# Patient Record
Sex: MALE | Race: WHITE | HISPANIC OR LATINO | Employment: FULL TIME | URBAN - METROPOLITAN AREA
[De-identification: names, ages, dates, MRNs, and addresses within clinical notes are randomized per-mention and may not be internally consistent; named-entity substitution may affect disease eponyms.]

---

## 2018-08-22 ENCOUNTER — HOSPITAL ENCOUNTER (EMERGENCY)
Facility: HOSPITAL | Age: 28
Discharge: HOME | End: 2018-08-22
Attending: EMERGENCY MEDICINE | Admitting: EMERGENCY MEDICINE
Payer: COMMERCIAL

## 2018-08-22 ENCOUNTER — APPOINTMENT (EMERGENCY)
Dept: RADIOLOGY | Facility: HOSPITAL | Age: 28
End: 2018-08-22
Payer: COMMERCIAL

## 2018-08-22 VITALS
HEART RATE: 106 BPM | OXYGEN SATURATION: 93 % | DIASTOLIC BLOOD PRESSURE: 87 MMHG | WEIGHT: 308.86 LBS | RESPIRATION RATE: 18 BRPM | HEIGHT: 72 IN | SYSTOLIC BLOOD PRESSURE: 149 MMHG | BODY MASS INDEX: 41.83 KG/M2 | TEMPERATURE: 98 F

## 2018-08-22 DIAGNOSIS — G89.29 CHRONIC LOW BACK PAIN: ICD-10-CM

## 2018-08-22 DIAGNOSIS — M54.50 CHRONIC LOW BACK PAIN: ICD-10-CM

## 2018-08-22 DIAGNOSIS — G89.29 CHRONIC KNEE PAIN: ICD-10-CM

## 2018-08-22 DIAGNOSIS — M25.569 CHRONIC KNEE PAIN: ICD-10-CM

## 2018-08-22 DIAGNOSIS — S80.00XA KNEE CONTUSION: Primary | ICD-10-CM

## 2018-08-22 PROCEDURE — 73564 X-RAY EXAM KNEE 4 OR MORE: CPT

## 2018-08-22 PROCEDURE — 99283 EMERGENCY DEPT VISIT LOW MDM: CPT

## 2018-08-22 PROCEDURE — 72100 X-RAY EXAM L-S SPINE 2/3 VWS: CPT

## 2018-08-22 RX ORDER — ACETAMINOPHEN 325 MG/1
975 TABLET ORAL ONCE
Status: COMPLETED | OUTPATIENT
Start: 2018-08-22 | End: 2018-08-22

## 2018-08-22 RX ORDER — NAPROXEN 250 MG/1
500 TABLET ORAL ONCE
Status: COMPLETED | OUTPATIENT
Start: 2018-08-22 | End: 2018-08-22

## 2018-08-22 RX ADMIN — ACETAMINOPHEN 975 MG: 325 TABLET, FILM COATED ORAL at 03:50

## 2018-08-22 RX ADMIN — NAPROXEN 500 MG: 250 TABLET ORAL at 03:50

## 2018-08-22 NOTE — DISCHARGE INSTRUCTIONS
You may continue taking anti-inflammatories and acetaminophen as previously directed  Keep your appointment with Dr Raghav Lind and check with the office to see if an earlier appointment is available  Chronic Back Pain   WHAT YOU NEED TO KNOW:   Chronic back pain is back pain that lasts 3 months or longer  This may include pain that has not been controlled or does not improve with treatment  Your back pain may cause weakness or pain that spreads to your arms or legs  DISCHARGE INSTRUCTIONS:   Return to the emergency department if:   · You have severe pain  · You have new signs of numbness or weakness, especially in your lower back, legs, arms, or genital area  · You lose control of your bladder or bowel movements  · You have a fever or sudden weight loss  Contact your healthcare provider if:   · You have new or worsened pain  · You have questions or concerns about your condition or care  Medicines:   · NSAIDs  help decrease swelling and pain  This medicine can be bought with or without a doctor's order  This medicine can cause stomach bleeding or kidney problems in certain people  If you take blood thinner medicine, always ask your healthcare provider if NSAIDs are safe for you  Always read the medicine label and follow the directions on it before using this medicine  · Acetaminophen  decreases pain  It is available without a doctor's order  Ask how much to take and how often to take it  Follow directions  Acetaminophen can cause liver damage if not taken correctly  · Prescription pain medicine  may also be given to decrease pain  Do not wait until the pain is severe before you take this medicine  · Muscle relaxers  help decrease muscle spasms and back pain  · Take your medicine as directed  Contact your healthcare provider if you think your medicine is not helping or if you have side effects  Tell him if you are allergic to any medicine   Keep a list of the medicines, vitamins, and herbs you take  Include the amounts, and when and why you take them  Bring the list or the pill bottles to follow-up visits  Carry your medicine list with you in case of an emergency  Follow up with your healthcare provider as directed: You may be referred to a sports medicine or spine specialist  Write down your questions so you remember to ask them during your visits  Manage your chronic back pain:   · Heat  helps decrease pain and muscle spasms  Apply heat on your back for 15 to 20 minutes every 2 hours or as often as directed  · Stay active  as much as you can without causing more pain  Ask your healthcare provider what exercises are right for you  Do not sit or lie down for long periods  This could make your back pain worse  Avoid heavy lifting until your pain is gone  · A physical therapist  can teach you exercises to help improve movement and strength, and to decrease pain  © 2017 [employer] Information is for End User's use only and may not be sold, redistributed or otherwise used for commercial purposes  All illustrations and images included in CareNotes® are the copyrighted property of [employer]  or Sunil Mcconnell  The above information is an  only  It is not intended as medical advice for individual conditions or treatments  Talk to your doctor, nurse or pharmacist before following any medical regimen to see if it is safe and effective for you  Knee Pain   WHAT YOU NEED TO KNOW:   Knee pain may start suddenly, or it may be a long-term problem  You may have pain on the side, front, or back of your knee  You may have knee stiffness and swelling  You may hear popping sounds or feel like your knee is giving way or locking up as you walk  You may feel pain when you sit, stand, walk, or climb up and down stairs  Knee pain can be caused by conditions such as obesity, inflammation, or strains or tears in ligaments or tendons     DISCHARGE INSTRUCTIONS: Follow up with your healthcare provider within 24 hours or as directed: You may need follow-up treatments, such as steroid injections to decrease pain  Write down your questions so you remember to ask them during your visits  Self-care:   · Rest  your knee so it can heal  Limit activities that increase your pain  · Ice  can help reduce swelling  Wrap ice in a towel and put it on your knee for as long and as often as directed  · Compression  with a brace or bandage can help reduce swelling  Use a brace or bandage only as directed  · Elevation  helps decrease pain and swelling  Elevate your knee while you are sitting or lying down  Prop your leg on pillows to keep your knee above the level of your heart  Medicines:   · NSAIDs  help decrease swelling and pain or fever  This medicine is available with or without a doctor's order  NSAIDs can cause stomach bleeding or kidney problems in certain people  If you take blood thinner medicine, always ask your healthcare provider if NSAIDs are safe for you  Always read the medicine label and follow directions  · Acetaminophen  decreases pain and fever  It is available without a doctor's order  Ask how much to take and when to take it  Follow directions  Acetaminophen can cause liver damage if not taken correctly  · Take your medicine as directed  Contact your healthcare provider if you think your medicine is not helping or if you have side effects  Tell him or her if you are allergic to any medicine  Keep a list of the medicines, vitamins, and herbs you take  Include the amounts, and when and why you take them  Bring the list or the pill bottles to follow-up visits  Carry your medicine list with you in case of an emergency  Exercise as directed: You may need to see a physical therapist or do recommended exercises to improve movement and decrease your pain  You may be directed to walk, swim, or ride a bike   Follow your exercise plan exactly as directed to avoid further injury  Contact your healthcare provider if:   · You have questions or concerns about your condition or care  Return to the emergency department if:   · Your pain is worse, even after treatment  · You cannot bend or straighten your leg completely  · The swelling around your knee does not go down even with treatment  · Your knee is painful and hot to the touch  © 2017 [employer]  Information is for End User's use only and may not be sold, redistributed or otherwise used for commercial purposes  All illustrations and images included in CareNotes® are the copyrighted property of [employer]  or [address]  The above information is an  only  It is not intended as medical advice for individual conditions or treatments  Talk to your doctor, nurse or pharmacist before following any medical regimen to see if it is safe and effective for you
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
## 2018-12-09 ENCOUNTER — HOSPITAL ENCOUNTER (INPATIENT)
Facility: HOSPITAL | Age: 28
LOS: 1 days | DRG: 817 | End: 2018-12-11
Attending: EMERGENCY MEDICINE | Admitting: INTERNAL MEDICINE
Payer: MEDICARE

## 2018-12-09 ENCOUNTER — APPOINTMENT (OUTPATIENT)
Dept: RADIOLOGY | Facility: HOSPITAL | Age: 28
DRG: 817 | End: 2018-12-09
Payer: MEDICARE

## 2018-12-09 DIAGNOSIS — T50.904A DRUG OVERDOSE, UNDETERMINED INTENT, INITIAL ENCOUNTER: ICD-10-CM

## 2018-12-09 DIAGNOSIS — F32.A DEPRESSION WITH SUICIDAL IDEATION: ICD-10-CM

## 2018-12-09 DIAGNOSIS — T42.4X4A BENZODIAZEPINE OVERDOSE OF UNDETERMINED INTENT, INITIAL ENCOUNTER: Primary | ICD-10-CM

## 2018-12-09 DIAGNOSIS — F16.10 ECSTASY ABUSE (HCC): ICD-10-CM

## 2018-12-09 DIAGNOSIS — Z76.5 DRUG-SEEKING BEHAVIOR: ICD-10-CM

## 2018-12-09 DIAGNOSIS — R41.82 ALTERED MENTAL STATUS: ICD-10-CM

## 2018-12-09 DIAGNOSIS — R45.851 DEPRESSION WITH SUICIDAL IDEATION: ICD-10-CM

## 2018-12-09 DIAGNOSIS — T48.3X1A: ICD-10-CM

## 2018-12-09 DIAGNOSIS — T44.3X1A ANTICHOLINERGIC DRUG OVERDOSE: ICD-10-CM

## 2018-12-09 PROBLEM — R74.01 TRANSAMINITIS: Status: ACTIVE | Noted: 2018-12-09

## 2018-12-09 PROBLEM — T50.901A OVERDOSE: Status: ACTIVE | Noted: 2018-12-09

## 2018-12-09 PROBLEM — I10 HYPERTENSION: Status: ACTIVE | Noted: 2018-12-09

## 2018-12-09 PROBLEM — G92.8 TOXIC METABOLIC ENCEPHALOPATHY: Status: ACTIVE | Noted: 2018-12-09

## 2018-12-09 PROBLEM — F41.9 ANXIETY: Status: ACTIVE | Noted: 2018-12-09

## 2018-12-09 LAB
ALBUMIN SERPL BCP-MCNC: 4 G/DL (ref 3.5–5)
ALBUMIN SERPL BCP-MCNC: 4.1 G/DL (ref 3.5–5)
ALP SERPL-CCNC: 120 U/L (ref 46–116)
ALP SERPL-CCNC: 126 U/L (ref 46–116)
ALT SERPL W P-5'-P-CCNC: 144 U/L (ref 12–78)
ALT SERPL W P-5'-P-CCNC: 149 U/L (ref 12–78)
AMPHETAMINES SERPL QL SCN: POSITIVE
ANION GAP SERPL CALCULATED.3IONS-SCNC: 6 MMOL/L (ref 4–13)
ANION GAP SERPL CALCULATED.3IONS-SCNC: 7 MMOL/L (ref 4–13)
APAP SERPL-MCNC: <2 UG/ML (ref 10–30)
AST SERPL W P-5'-P-CCNC: 60 U/L (ref 5–45)
AST SERPL W P-5'-P-CCNC: 69 U/L (ref 5–45)
BARBITURATES UR QL: NEGATIVE
BASOPHILS # BLD AUTO: 0.04 THOUSANDS/ΜL (ref 0–0.1)
BASOPHILS NFR BLD AUTO: 1 % (ref 0–1)
BENZODIAZ UR QL: POSITIVE
BILIRUB SERPL-MCNC: 0.2 MG/DL (ref 0.2–1)
BILIRUB SERPL-MCNC: 0.2 MG/DL (ref 0.2–1)
BUN SERPL-MCNC: 10 MG/DL (ref 5–25)
BUN SERPL-MCNC: 11 MG/DL (ref 5–25)
CALCIUM SERPL-MCNC: 9.4 MG/DL (ref 8.3–10.1)
CALCIUM SERPL-MCNC: 9.6 MG/DL (ref 8.3–10.1)
CHLORIDE SERPL-SCNC: 104 MMOL/L (ref 100–108)
CHLORIDE SERPL-SCNC: 106 MMOL/L (ref 100–108)
CO2 SERPL-SCNC: 26 MMOL/L (ref 21–32)
CO2 SERPL-SCNC: 27 MMOL/L (ref 21–32)
COCAINE UR QL: POSITIVE
CREAT SERPL-MCNC: 0.94 MG/DL (ref 0.6–1.3)
CREAT SERPL-MCNC: 1.04 MG/DL (ref 0.6–1.3)
EOSINOPHIL # BLD AUTO: 0.66 THOUSAND/ΜL (ref 0–0.61)
EOSINOPHIL NFR BLD AUTO: 8 % (ref 0–6)
ERYTHROCYTE [DISTWIDTH] IN BLOOD BY AUTOMATED COUNT: 13.8 % (ref 11.6–15.1)
ETHANOL SERPL-MCNC: <3 MG/DL (ref 0–3)
GFR SERPL CREATININE-BSD FRML MDRD: 110 ML/MIN/1.73SQ M
GFR SERPL CREATININE-BSD FRML MDRD: 97 ML/MIN/1.73SQ M
GLUCOSE P FAST SERPL-MCNC: 148 MG/DL (ref 65–99)
GLUCOSE SERPL-MCNC: 112 MG/DL (ref 65–140)
GLUCOSE SERPL-MCNC: 148 MG/DL (ref 65–140)
HCT VFR BLD AUTO: 39.7 % (ref 36.5–49.3)
HGB BLD-MCNC: 12.4 G/DL (ref 12–17)
IMM GRANULOCYTES # BLD AUTO: 0.03 THOUSAND/UL (ref 0–0.2)
IMM GRANULOCYTES NFR BLD AUTO: 0 % (ref 0–2)
LYMPHOCYTES # BLD AUTO: 2.22 THOUSANDS/ΜL (ref 0.6–4.47)
LYMPHOCYTES NFR BLD AUTO: 26 % (ref 14–44)
MCH RBC QN AUTO: 30.5 PG (ref 26.8–34.3)
MCHC RBC AUTO-ENTMCNC: 31.2 G/DL (ref 31.4–37.4)
MCV RBC AUTO: 98 FL (ref 82–98)
METHADONE UR QL: NEGATIVE
MONOCYTES # BLD AUTO: 0.59 THOUSAND/ΜL (ref 0.17–1.22)
MONOCYTES NFR BLD AUTO: 7 % (ref 4–12)
NEUTROPHILS # BLD AUTO: 4.87 THOUSANDS/ΜL (ref 1.85–7.62)
NEUTS SEG NFR BLD AUTO: 58 % (ref 43–75)
NRBC BLD AUTO-RTO: 0 /100 WBCS
OPIATES UR QL SCN: POSITIVE
PCP UR QL: POSITIVE
PLATELET # BLD AUTO: 242 THOUSANDS/UL (ref 149–390)
PMV BLD AUTO: 11.7 FL (ref 8.9–12.7)
POTASSIUM SERPL-SCNC: 3.7 MMOL/L (ref 3.5–5.3)
POTASSIUM SERPL-SCNC: 4.7 MMOL/L (ref 3.5–5.3)
PROT SERPL-MCNC: 7.6 G/DL (ref 6.4–8.2)
PROT SERPL-MCNC: 7.9 G/DL (ref 6.4–8.2)
RBC # BLD AUTO: 4.07 MILLION/UL (ref 3.88–5.62)
SALICYLATES SERPL-MCNC: <3 MG/DL (ref 3–20)
SODIUM SERPL-SCNC: 137 MMOL/L (ref 136–145)
SODIUM SERPL-SCNC: 139 MMOL/L (ref 136–145)
THC UR QL: POSITIVE
WBC # BLD AUTO: 8.41 THOUSAND/UL (ref 4.31–10.16)

## 2018-12-09 PROCEDURE — 80329 ANALGESICS NON-OPIOID 1 OR 2: CPT | Performed by: EMERGENCY MEDICINE

## 2018-12-09 PROCEDURE — 51798 US URINE CAPACITY MEASURE: CPT

## 2018-12-09 PROCEDURE — 85025 COMPLETE CBC W/AUTO DIFF WBC: CPT | Performed by: EMERGENCY MEDICINE

## 2018-12-09 PROCEDURE — 80307 DRUG TEST PRSMV CHEM ANLYZR: CPT | Performed by: EMERGENCY MEDICINE

## 2018-12-09 PROCEDURE — 96374 THER/PROPH/DIAG INJ IV PUSH: CPT

## 2018-12-09 PROCEDURE — 36415 COLL VENOUS BLD VENIPUNCTURE: CPT | Performed by: EMERGENCY MEDICINE

## 2018-12-09 PROCEDURE — 96361 HYDRATE IV INFUSION ADD-ON: CPT

## 2018-12-09 PROCEDURE — 94640 AIRWAY INHALATION TREATMENT: CPT

## 2018-12-09 PROCEDURE — 94760 N-INVAS EAR/PLS OXIMETRY 1: CPT

## 2018-12-09 PROCEDURE — 80320 DRUG SCREEN QUANTALCOHOLS: CPT | Performed by: EMERGENCY MEDICINE

## 2018-12-09 PROCEDURE — 80053 COMPREHEN METABOLIC PANEL: CPT | Performed by: EMERGENCY MEDICINE

## 2018-12-09 PROCEDURE — 93005 ELECTROCARDIOGRAM TRACING: CPT

## 2018-12-09 PROCEDURE — 99245 OFF/OP CONSLTJ NEW/EST HI 55: CPT | Performed by: EMERGENCY MEDICINE

## 2018-12-09 PROCEDURE — 70450 CT HEAD/BRAIN W/O DYE: CPT

## 2018-12-09 PROCEDURE — 99285 EMERGENCY DEPT VISIT HI MDM: CPT

## 2018-12-09 PROCEDURE — 99220 PR INITIAL OBSERVATION CARE/DAY 70 MINUTES: CPT | Performed by: GENERAL PRACTICE

## 2018-12-09 RX ORDER — PHYSOSTIGMINE SALICYLATE 1 MG/ML
2 INJECTION INTRAVENOUS ONCE
Status: COMPLETED | OUTPATIENT
Start: 2018-12-09 | End: 2018-12-09

## 2018-12-09 RX ORDER — LORAZEPAM 2 MG/ML
1 INJECTION INTRAMUSCULAR
Status: DISCONTINUED | OUTPATIENT
Start: 2018-12-09 | End: 2018-12-10

## 2018-12-09 RX ORDER — HYDRALAZINE HYDROCHLORIDE 20 MG/ML
5 INJECTION INTRAMUSCULAR; INTRAVENOUS EVERY 6 HOURS PRN
Status: DISCONTINUED | OUTPATIENT
Start: 2018-12-09 | End: 2018-12-09

## 2018-12-09 RX ORDER — LEVALBUTEROL 1.25 MG/.5ML
1.25 SOLUTION, CONCENTRATE RESPIRATORY (INHALATION) EVERY 6 HOURS PRN
Status: DISCONTINUED | OUTPATIENT
Start: 2018-12-09 | End: 2018-12-09

## 2018-12-09 RX ORDER — HYDRALAZINE HYDROCHLORIDE 20 MG/ML
5 INJECTION INTRAMUSCULAR; INTRAVENOUS EVERY 4 HOURS PRN
Status: DISCONTINUED | OUTPATIENT
Start: 2018-12-09 | End: 2018-12-09

## 2018-12-09 RX ORDER — HYDRALAZINE HYDROCHLORIDE 20 MG/ML
5 INJECTION INTRAMUSCULAR; INTRAVENOUS EVERY 6 HOURS PRN
Status: DISCONTINUED | OUTPATIENT
Start: 2018-12-09 | End: 2018-12-10

## 2018-12-09 RX ORDER — LEVALBUTEROL 1.25 MG/.5ML
SOLUTION, CONCENTRATE RESPIRATORY (INHALATION)
Status: COMPLETED
Start: 2018-12-09 | End: 2018-12-09

## 2018-12-09 RX ORDER — NICOTINE 21 MG/24HR
1 PATCH, TRANSDERMAL 24 HOURS TRANSDERMAL DAILY
Status: DISCONTINUED | OUTPATIENT
Start: 2018-12-10 | End: 2018-12-11 | Stop reason: HOSPADM

## 2018-12-09 RX ORDER — LEVALBUTEROL 1.25 MG/.5ML
1.25 SOLUTION, CONCENTRATE RESPIRATORY (INHALATION)
Status: DISCONTINUED | OUTPATIENT
Start: 2018-12-10 | End: 2018-12-11 | Stop reason: HOSPADM

## 2018-12-09 RX ORDER — ALBUTEROL SULFATE 90 UG/1
2 AEROSOL, METERED RESPIRATORY (INHALATION) EVERY 4 HOURS PRN
Status: DISCONTINUED | OUTPATIENT
Start: 2018-12-09 | End: 2018-12-11 | Stop reason: HOSPADM

## 2018-12-09 RX ORDER — FLUMAZENIL 0.1 MG/ML
0.5 INJECTION, SOLUTION INTRAVENOUS ONCE
Status: COMPLETED | OUTPATIENT
Start: 2018-12-09 | End: 2018-12-09

## 2018-12-09 RX ORDER — SODIUM CHLORIDE 9 MG/ML
50 INJECTION, SOLUTION INTRAVENOUS CONTINUOUS
Status: DISCONTINUED | OUTPATIENT
Start: 2018-12-09 | End: 2018-12-11

## 2018-12-09 RX ADMIN — FLUMAZENIL 0.5 MG: 0.1 INJECTION, SOLUTION INTRAVENOUS at 11:41

## 2018-12-09 RX ADMIN — HYDRALAZINE HYDROCHLORIDE 5 MG: 20 INJECTION INTRAMUSCULAR; INTRAVENOUS at 23:23

## 2018-12-09 RX ADMIN — SODIUM CHLORIDE 50 ML/HR: 0.9 INJECTION, SOLUTION INTRAVENOUS at 20:57

## 2018-12-09 RX ADMIN — SODIUM CHLORIDE 1000 ML: 0.9 INJECTION, SOLUTION INTRAVENOUS at 12:04

## 2018-12-09 RX ADMIN — IPRATROPIUM BROMIDE 0.5 MG: 0.5 SOLUTION RESPIRATORY (INHALATION) at 21:29

## 2018-12-09 RX ADMIN — ACETYLCYSTEINE 15000 MG: 200 INJECTION, SOLUTION INTRAVENOUS at 18:55

## 2018-12-09 RX ADMIN — LEVALBUTEROL 1.25 MG: 1.25 SOLUTION, CONCENTRATE RESPIRATORY (INHALATION) at 21:29

## 2018-12-09 RX ADMIN — PHYSOSTIGMINE SALICYLATE 2 MG: 1 INJECTION INTRAVENOUS at 17:45

## 2018-12-09 RX ADMIN — ACETYLCYSTEINE 5000 MG: 200 INJECTION, SOLUTION INTRAVENOUS at 21:01

## 2018-12-09 RX ADMIN — HYDRALAZINE HYDROCHLORIDE 5 MG: 20 INJECTION INTRAMUSCULAR; INTRAVENOUS at 20:45
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
## 2018-12-09 NOTE — CONSULTS
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, dextromethorphan   3  Hypertension secondary to anticholinergic toxicity, pseudoephedrine and cocaine  4  Non-acute acetaminophen overdose with associated tansaminitis   5  Likely suicide attempt       RECOMMENDATIONS:  Please admit patient and continue supportive care including cardiac monitoring, continuous pulse oximetry intravenous fluid hydration, and monitoring urinary output  Presentation is consistent with both anticholinergic, sedative hypnotic toxidromes and nonacute acetaminophen overdose  I initially requested a repeat complete metabolic panel to evaluate for any dynamic changes in his transaminases  Considering ALT is greater than AST, please also check a hepatitis panel  Flumazenil was given by ED physician with response consistent with benzodiazepine overdose, naloxone was also given by ED physician without response  However, his predominant syndrome is anticholinergic  There is currently no respiratory depression  Given his anticholinergic toxidrome, I administered physostigmine with full transient reversal of delirium  He admitted to overdose but denied self-harm  He has essentially self-treated for anticholinergic-associated agitation with his benzodiazepine overdose, however, if he becomes agitated, more benzodiazepines are appropriate       While oriented during treatment with physostigmine, he also confirmed taking excessive acetaminophen, greater than 3 grams per day during the recent two days prior to this overdose  The patient's liver enzymes are also mildly elevated beyond baseline after resuscitation  Given that he has been abusing cough medicine as well as confirmed acetaminophen misadventrue, please continue N-acetylcysteine as ordered by me  It is unlikely he will need to continue NAC treatment for full 21 hours and we will make further determination tomorrow morning  He did urinate during treatment with phsyostigmine, but please insert moon catheter if urinary retention continues  I have repeated an ECG and it did not demonstrate any progressive QRS widening    Please obtain a complete metabolic panel in the morning  His creatinine is normal at this time but if it elevates, please also obtain a CK  He is currently receiving maintenance fluids, please continue  I have ordered the ECG and CMP  Patient will need psychiatric evaluation and 1:1 monitoring  We will continue to follow the patient  For further questions, please call [facility]  Service or Patient Access Center to reach the medical  on call  Please see additional teaching note below (if available)    Medical Toxicology Teaching Note  [facility]  Acetaminophen Toxicity  Last revised October 2017     Acetaminophen (Tylenol) is a nonopiod analgesic and antipyretic medication found in many over-the-counter and prescription products such as Tylenol PM, Norco, Percocet, Nyquil, Vicks Formula 44-D  The recommended maximum daily dose of acetaminophen for adults is 3g/day, and 75-90mg/kg/day for children  Alcoholics may safely take Tylenol in therapeutic doses, but they may be at increased risk for hepatotoxicity in overdose  Mechanism of Toxicity: Acetaminophen is primarily metabolized by the liver   In therapeutic doses, about 90% of acetaminophen is conjugated to nontoxic metabolites (glucoronides and sulfates)  A small portion (<5%) is conjugated by cytochrome P450 enzyme, subunit CYP2E1, to a toxic metabolite, N-acetyl-p-benzoquinoneimine (NAPQI)  This metabolite is further conjugated by glutathione, to nontoxic metabolites eliminated by the kidneys  Liver Injury:  In toxic doses, the usual metabolic pathways are overwhelmed; acetaminophen is shunted to the cytochrome P450 pathway, creating NAPQI  Glutathione stores are depleted and NAPQI is produced  Cellular injury and hepatic necrosis may occur as NAPQI accumulates  Renal Injury:  Cytochrome P450 activity in the kidneys is thought to cause direct renal damage  Renal insufficiency may also develop during fulminant hepatic failure due to hepatorenal syndrome  Renal toxicity is usually associated with liver injury  Pharmacokinetics:  Acetaminophen is rapidly absorbed  Peak levels occur within  minutes with normal doses  Delayed absorption may occur with sustained release products or with co-ingestions that slow the GI tract (opiods, anticholinergics)  The elimination half-life is 1-3 hours after therapeutic doses and may extend to 12 hours after overdose  Toxic Dose:  Toxicity in adults may occur with acute ingestions of 7g, and 200mg/kg in children  Hepatic injury following chronic ingestions may occur at any dose above the daily recommended dose  Clinical Presentation:    Acute Ingestion: Within 8 hrs of an acute ingestion, there are usually few symptoms  Between 8-30 hours after a toxic, acute ingestion, a transaminitis will develop  Nausea, vomiting, and right upper quadrant pain may occur  Within 12-36 hours, worsening AST/ALT develops with elevated bilirubin and INR  The most severe cases will develop fulminant liver failure with hepatic encephalopathy and acidosis, usually within 3-7 days post overdose  The patient should be evaluated for a liver transplantation     Repeated Supra-therapeutic Ingestion: Due to a sub-acute course, patients may present anywhere along a spectrum  normal LFTS to asymptomatic elevation of enzymes to hepatic failure  Diagnosis   Acute Ingestion (Time of Ingestion Known): After an acute ingestion at a known time, obtain a 4-hour post-ingestion serum acetaminophen level and plot the level on the Marcial-Brocks nomogram (see below)  This nomogram is used to predict the likelihood of hepatic toxicity based on the level of acetaminophen between 4 and 24 hours post-ingestion  The nomogram CANNOT be used if the time of ingestion is unknown  The dotted line (Rumack-Brock line), marking a 4-hour level at 200 mcg/ml, is the original line developed from the study above which hepatic toxicity will probably occur  The solid line (Treatment Line), marking a 4-hour level at 150ug/ml  is the treatment line accepted as the standard of care in the [place] and is 25% lower as a safety margin  If the patients serum APAP level falls above the treatment line, start treatment with N-acetylcysteine (NAC)  (see Treatment below)           Acute Ingestion (Time of Ingestion Unknown) or Repeated Supra-therapeutic Ingestion An acetaminophen level CANNOT be plotted on the Rumack-Brocks nomogram  Draw an APAP level and AST/ALT at time of presentation  Anyone with an APAP level> 10mcg/ml OR elevated AST/ALT should start NAC  (see Treatment below)     TREATMENT   Emergency and Supportive Care: Treat nausea and vomiting to protect airway and support safe administration of charcoal and NAC, when indicated (see below)  Provide standard supportive care for liver and renal failure  Contact liver transplant team if fulminant hepatic failure occurs  Decontamination:  Administer activated charcoal within 2 hours of ingestion (consider later if extended release preparations)  Use antiemetics for nausea  Activated charcoal does bind to NAC, but the effect is not thought to be clinically significant   Gastric emptying is not recommended  Specific Drugs and Antidotes  Acute Ingestion Treat with NAC if the APAP level falls above the Treatment Line on the nomogram  The maximal benefit occurs if given within 8 hours of acute ingestion  Therefore, it is recommended to empirically start NAC before a level is obtained if there is a reasonable concern of a toxic ingestion presenting close to 8 hours or beyond  In late presenters (>8hrs), start NAC and treat for a full course or longer if LFTS remain abnormal  Treatment maybe stopped when AST/ALT peak and then downtrend, with an INR <2 and patient is clinically well  If abnormal labs persist, continue NAC and call Toxicology  There are two routes of administration for NAC, oral and IV  The treatment protocols are described below  Acute Ingestion (Time of Ingestion Unknown) or Repeated Supra-therapeutic Ingestion   The nomogram CANNOT be used to estimate the risk of hepatotoxicity  At presentation, check a serum APAP level and AST/ALT  If the APAP level is above 10 mcg/ml or the AST/ALT are elevated, start NAC treatment for 12 hours  If abnormalities persist, continue NAC treatment and call toxicology  If the APAP level is undetectable and AST and ALT are downtrending at the end of 12 hours, treatment may be stopped  Intravenous (Acetadote)   Loading dose- 150mg/kg infused over 15-60 minutes   Maintenance Infusion #1- 50mg/kg (12 5mg/kg/hr) over 4 hours   Maintenance Infusion #2 -100mg/kg (6 25 mg/kg/hr) until treatment endpoint   Treatment Endpoint: 20 hours or more   NAC should be continued for the full course  NAC can be stopped when APAP is undetectable, AST/ALT have peaked and are downtrending, and patient appears clinically well  Consultation with a medical  [facility] is recommended before changes in the duration of therapy are made       Acetaminophen Toxicity Dos and Donts   Acute Ingestions   DO give charcoal for decontamination within 2 hours of ingestion if the patient can adequately protect their airway  DO start NAC empirically, i e  without an APAP level, if the ingestion is likely a large overdose presenting at 8 hours or more after ingestion  DO contact the Liver Transplant Team early if liver failure is developing  DO NOT get a level before 4hrs post-ingestion if the time of ingestion is certain in an acute overdose  DO NOT stop NAC therapy until full course is finished or truncated therapy is recommended by the [facility]  Repeated Supra-Therapeutic Ingestions (RSI)   DO ask patients with pain complaints (toothaches, back pain, cancer) about the amount of acetaminophen they use  DO NOT use the Marcial-Lockwood nomogram to determine if the APAP level is toxic  DO NOT stop NAC therapy until full course is finished or truncated therapy is recommended by the [facility]  NAC Protocols   DO stop IV NAC if an anaphylactoid reaction occurs (rare)  Treat the reaction appropriately and call the [facility] for recommendations on continued NAC therapy  DO give charcoal with oral NAC when charcoal is indicated  References   Abigail VAUGHN Acetaminophen  In [facility] EM, [address] et al Geraldo Niota  Medical Toxicology 3rd edition  [place] PA: [address] [facility] [facility], 2004: pp [phone]  Kasandra GONZÁLES Acetaminophen  In [facility] Toxicology  [address] [facility]  Anticholinergic Syndrome  Updated 03/04/2008    a- Background: Anticholinergic intoxication can occur from exposure to a wide variety of prescription and over-the-counter medications and numerous plants and mushrooms  Common drugs that have anticholinergic activities include: antihistamines, antipsychotics, antispasmodics, skeletal muscle relaxants, and tricyclic antidepressants (TCAs)   Plants and mushrooms containing anticholinergic alkaloids include: jimsonweed (Datura stramonium), deadly nightshade (Atropa belladonna), and fly agaric (Aman[facility] Pan Wilson HealthGuilherme  b- Mechanism of Toxicity:   Competitively antagonize the effect of acetylcholine at peripheral muscarinic receptors  They mostly affect exocrine glands (such as sweating and salivation) and smooth muscle  Inhibition of muscarinic activities in the heart leads to a rapid heartbeat   Pharmacokinetics: Absorption may be delayed due to its effect on GI motility  Duration of toxic effect can be quite prolonged (e g  benztropine  2-3 days)  c- Toxic dose: The range of toxicity is highly variable and unpredictable   Examples of Fatal doses from case reports:  i  Young Child: 1-2 mg Atropine, instilled in the eye  ii  Adult: 32 mg Atropine, IM injection   Minimal effect: increased heart rate and dry mouth after 360 mg of trospium chloride  d- Clinical presentation: Anticholinergic syndrome is characterized by warm, dry, flushed skin, dry mucous membranes, mydriasis, delirium, tachycardia, ileus, and urinary retention  Jerky myoclonic movements and choreoathetosis are common and can lead to rhabdomyolysis  Hyperthermia, coma, respiratory arrest and seizures may occur  e- Diagnosis: Based on history of exposure and typical features of anticholinergic syndrome  Trial dose of physostigmine can be used to confirm the diagnosis, especially with rapid reversal of the syndrome  f- Laboratory studies: Not generally available for the anticholinergics, but other labs can be useful such as electrolytes, glucose, CPK, and ECG  g- Treatment:    ABC   Seizure and muscular hyperactivity: Should be treated with benzodiazepines  Treat aggressively to prevent hyperthermia and rhabdomyolysis and their resultant complications  If unsuccessful use phenobarbital or propofol   Delirium: Treat with benzodiazepines, if resistant to benzodiazepines consider treating with physostigmine     GI decontamination maybe helpful in delayed presentation secondary to decreased GI motility provided that the patient is awake and alert    Enhanced elimination: Procedures such as hemodialysis and repeated-dose activated charcoal are not effective in removing anticholinergic agents   Physostigmine:   i  Pharmacology: Physostigmine is a carbamate and a reversible inhibitor of acetylcholinesterase  It increases acetylcholine concentration, causing stimulation of both muscarinic and nicotinic receptors  It also can penetrate the blood-brain barrier  It has nonspecific arousal effects  1  Onset of action: 3-8 minutes after parenteral administration  2  Duration of action: 30-90 minutes  3  Elimination half-life: 15-40 minutes  ii  Indications:  1  Severe anticholinergic syndrome from antimuscarinic agents  Generally used to reverse delirium resistant to benzodiazepines  2  Diagnostically: To differentiate functional psychosis from anticholinergic delirium   iii  Contraindications:  1  Should not be used as an antidote for cyclic antidepressant overdose because it may worsen cardiac conduction disturbance, cause bradyarrythmias or asystole, and aggravate or precipitate seizures  2  Do not use physostigmine with concurrent use of depolarizing neuromuscular blockers (e g  succinylcholine)  3  Known hypersensitivity to agent or preservative  4  Relative contraindication may include: Asthma, peripheral vascular disease, intestinal and bladder blockade, parkinsonian syndrome, and AV Block  iv  Adverse effects:  1  Bradycardia, heart block, and asystole  2  Seizure (particularly with rapid administration or excessive dose)  3  Nausea, vomiting, hypersalivation, and diarrhea  4  Bronchorrhea and bronchospasm  5  Fasciculation and muscle weakness  v  Dosage:  1  Adult: 0 5-2 mg slow IV push (£ 1 mg/min)  2  Children: 0 02 mg/kg slow IV push (£ 0 5 mg/min)  3  Repeat as needed every 10-30 minutes  4  Precautions: Patient should be on a cardiac monitor   Atropine should be kept at bedside to treat excessive muscarinic stimulation  5  Should not be administered IM or as a continuous infusion  6  It is better to undertreat than to overtreat  Physostigmine toxicity results when used in excess or in the absence of antimuscarinic toxicity  References:  Brendon Champagnenacho  Poisoning & Drug Overdose  2007  Maximilian Cortes Toxicologic Emergencies  2006  Medical Toxicology  [address] [facility]  Benzodiazepines & Benzodiazpine-like Drugs      Background: Benzodiazepines are the most commonly prescribed sedatives  They are used frequently for the treatment of anxiety, stress reactions, insomnia, muscle spasms, alcohol withdrawal, seizure control and other psychiatric disorders  Benzodiazepines include a wide array of compounds which vary in potency, duration of effect, presence or absence of active metabolites and clinical use  Other non-benzodiazepines include zolpidem and zaleplon, which are benzodiazepine-like  Death from isolated benzodiazepine overdose is rare  Mechanism of Toxicity: Benzodiazepines enhance the action of the inhibitory neurotransmitter gamma-aminobutyric acid (PARKER) and inhibit other neuronal systems by poorly defined mechanisms  This results in generalized depression of spinal reflexes and the reticular activating system causing CNS and respiratory depression  Respiratory arrest is more common with shorter-acting benzodiazepines such as triazolam (Halcion), alprazolam (Xanax), and midazolam (Versed)  Cardiopulmonary arrest has been documented post rapid injection of diazepam, this was thought to be secondary to either its CNS depressant effects or because of the toxic effects of its diluent propylene glycol    Propylene glycol has also been implicated in multiple case reports of metabolic acidosis, hyperosmolar states and cardiovascular compromise in patients who have received prolonged sedation with lorazepam      Pharmacokinetics: Most of these agents are highly protein bound (%)  Variables such as time to peak blood level, elimination half-life, and the presence or absence of active metabolites vary widely among different compounds  Some benzodiazepines, like diazepam, undergo demethylation in the liver and yield active metabolites which have a prolonged half-life compared to the parent compound  Often there is no correlation between therapeutic and biological half-lives  A few commonly used agents are listed below  Remember, with supra-therapeutic ingestions these half-lives are unpredictable  Drug Half-life (hours) Active Metabolite   Alprazolam 6 3-26 9 No   Clonazepam 18-50 No   Diazepam  Yes   Lorazepam 10-20 No   Midazolam 2 2-6 8 Yes     Toxic Dose: Benzodiazepines in general have a very wide therapeutic window; however, co-ingestion of other drugs with CNS with depressant effects like ethanol, barbiturates, antipsychotics or opioids, results in a synergistic effect  Clinical Presentation: Onset of CNS depression may be observed within  minutes of ingestion, depending upon the compound  Lethargy, slurred speech, incoordination, ataxia, stupor, coma and respiratory arrest may occur  Often patients with benzodiazepine-induced coma have hyporeflexia and mid-range or small pupils  Hypothermia can also occur in overdose  Complications after ingestion are more likely when short-acting or other depressant agents are involved  Children often present with lethargy and CNS depression; however, in a case series in children with a mean age of 43 months, 17% presented with ataxia alone  Diagnostic Testing: Consider benzdiazepine ingestion based upon history & presentation  The differential should include other sedative-hypnotics, antidepressants, antipsychotics, and narcotics  Coma and small pupils will not respond to naloxone administration, but may reverse with flumazenil    Serum levels are often available from commercial laboratories, but are rarely of value  Urine and blood qualitative screening may provide rapid confirmation of exposure  However, immunoassays do not detect all benzodiazepines so, a negative screen does not exclude significant exposure  Other useful tests include glucose, ABG/VBG or pulse oximetry  Decontamination:  Administer activated charcoal with caution  If the patient is sleepy, avoid administration and support symptomatically  Treatment:  Symptomatic and supportive treatment is the mainstay of therapy  Intubate immediately if the patient is not protecting his or her airway  Hypotensive patients should be fluid resuscitated with volume expansion  Profound hypotension is rare and should lead the provider to consider co-ingestions  Use vasopressors when patients do not respond to IVF or in patients with a history of or clinical presentation consistent with CHF, cardiomyopathy or pulmonary edema  Flumazenil is a specific benzodiazepine receptor antagonist that can rapidly reverse benzodiazepine effect; however, because overdose with this class of drug is rarely fatal, the role of flumazenil in routine management has yet to be established  It is administered with a starting dose of 0 1-0 2mg IV and repeated as needed up to a maximum of 2mg  Continuous IV infusion from 0 1-1mg/h in 0 9%NaCl or D5W may also be utilized  The most important drawback to its administration is that flumazenil may induce seizures and dysrhythmias in patients with co-intoxication with cocaine or tricyclic antidepressants  It does not reliably reverse respiratory depression, but does reverse CNS depression  Flumazenil also effectively reverses the depressive effects caused by the non-benzodiazepines zolpidem and zaleplon  Administration may induce acute withdrawal, including seizures and autonomic instability in those patients who are addicted to benzodiazepines     Repeated doses or a continuous infusion are often required as the duration of action for most benzodiazepines is longer than flumazenil  There is no role for diuresis, dialysis, or hemoperfusion  References:    Bere Waller  Poisoning & Drug Overdose, 5th Ed (2007)  Maximilian Cortes Toxicologic Emergencies, 8th Ed (2006)        Hx and PE limited by: encephalopathy     HPI: Perry iRtter is a 29y o  year old male who presents with report of overdosing vs abusing multiple drugs per his girlfriend  He was somnolent and awoke with flumazenil but was then very confused and encephalopathic when awakened  Liver enzymes were also mildly elevated  I administered physostigmine in the emergency department and the patient responded well  He admitted to taking excessive amounts of acetaminophen during the last several days to treat a headache and then overdosing today on multiple medications as described by his girlfriend  Two days overdose was reportedly recreational   He denies suicide attempt  While therapeutic on physostigmine, he also was able to void his bladder  He denies any pain  Review of Systems   Constitutional: Negative for fatigue  HENT: Negative  Respiratory: Negative  Cardiovascular: Negative  Gastrointestinal: Negative  Genitourinary: Positive for difficulty urinating  Musculoskeletal: Negative  Neurological: Negative  Hematological: Negative  Psychiatric/Behavioral: Negative  Negative for self-injury and suicidal ideas  Historical Information   Past Medical History:   Diagnosis Date    Anxiety     Asthma     Depression     Hypertension      Past Surgical History:   Procedure Laterality Date    KNEE SURGERY Right     TONSILLECTOMY       Social History   History   Alcohol Use    Yes     Comment: occassionaly     History   Drug Use No     History   Smoking Status    Current Every Day Smoker    Types: Cigarettes, E-Cigarettes   Smokeless Tobacco    Never Used     History reviewed  No pertinent family history       Prior to Admission medications    Not on File       Current Facility-Administered Medications   Medication Dose Route Frequency    naloxone (NARCAN) 0 04 mg/mL syringe 0 04 mg  0 04 mg Intravenous Once    physostigmine salicylate (ANTILIRIUM) injection 2 mg  2 mg Intravenous Once       Allergies   Allergen Reactions    Toradol [Ketorolac Tromethamine] Hives       Objective       Intake/Output Summary (Last 24 hours) at 12/09/18 1714  Last data filed at 12/09/18 1430   Gross per 24 hour   Intake             1000 ml   Output                0 ml   Net             1000 ml       Invasive Devices:   Peripheral IV 12/09/18 Right Hand (Active)       Peripheral IV 12/09/18 Left Antecubital (Active)   Dressing Type Transparent 12/9/2018 11:30 AM   Line Status Blood return noted; Saline locked 12/9/2018 11:30 AM       Vitals   Vitals:    12/09/18 1430 12/09/18 1439 12/09/18 1529 12/09/18 1630   BP: (!) 187/88 (!) 187/88 (!) 197/87 125/63   TempSrc:       Pulse: (!) 116 (!) 115 (!) 113 (!) 119   Resp:  14 16 16   Patient Position - Orthostatic VS:  Lying Lying Lying   Temp:           Physical Exam   Constitutional: He appears well-developed and well-nourished  HENT:   Head: Normocephalic and atraumatic  Eyes: Conjunctivae are normal    Pupils 4mm bilaterally, equal and sluggishly reactive   +nystagmus    Neck: Normal range of motion  Neck supple  Cardiovascular: Regular rhythm  Tachycardia present  Pulmonary/Chest: Effort normal and breath sounds normal    Abdominal: Soft  Bowel sounds are absent  Musculoskeletal: Normal range of motion  He exhibits no edema  Neurological: He has normal reflexes  He exhibits normal muscle tone  Skin: Skin is warm and dry  Psychiatric: He is not agitated  He does not express inappropriate judgment  Patient is sedated  After physostigmine he was alert and oriented x3 and displayed inappropriate judgment but denied suicidal ideation   Nursing note and vitals reviewed        EKG, Pathology, and Other Studies: I have personally reviewed and interpreted the ECG   ECG: Sinus tachycardia, 122 BPM,  ms, QTc 441 ms     Lab Results: I have personally reviewed and interpreted the laboratory values    Labs:    Results from last 7 days  Lab Units 12/09/18  1408   WBC Thousand/uL 8 41   HEMOGLOBIN g/dL 12 4   HEMATOCRIT % 39 7   PLATELETS Thousands/uL 242   NEUTROS PCT % 58   LYMPHS PCT % 26   MONOS PCT % 7        Results from last 7 days  Lab Units 12/09/18  1625   POTASSIUM mmol/L 4 7   CHLORIDE mmol/L 104   CO2 mmol/L 27   BUN mg/dL 10   CREATININE mg/dL 0 94   CALCIUM mg/dL 9 4   ALK PHOS U/L 120*   ALT U/L 149*   AST U/L 69*                      Results from last 7 days  Lab Units 12/09/18  1201   ACETAMINOPHEN LVL ug/mL <2*   ETHANOL LVL mg/dL <3   SALICYLATE LVL mg/dL <3*         Imaging Studies: I have personally reviewed pertinent reports  Counseling / Coordination of Care  Total floor / unit time spent today 125 minutes  Greater than 50% of total time was spent with the patient and / or family counseling and / or coordination of care

## 2018-12-09 NOTE — ED PROVIDER NOTES
History  Chief Complaint   Patient presents with    Overdose - Accidental     pt lethargic, slurred speech, as per EMS pts significant other reports pt took xanax and drank cough medicine  Straw with white powder in it found on pt's posession  HPI  Patient is a 26-year-old gentleman with a history of asthma and hypertension who presents to the emergency department for an accidental drug overdose  He arrives to the emergency department able to follow commands but is not able to provide any meaningful history  I was able to speak with the patient's girlfriend Natalio Elise, who lives with him  She tells me patient took a tablet of ecstasy around 12:00 a m  last night  He was also read online about ways to improve the Ecstasy high, and drink a 120 mL bottle of cough medicine containing pseudoephedrine, dextromethophran, and brompheneramine  She also states that he stool several of her tablets of Xanax and believes he took between 10 and 12 mg while she was in the shower  After she came out she noted his gait was ataxic and he was slurring his speech  He was speaking nonsensical phrases  She did not witness him fall or hit his head  She called EMS  When EMS arrived they found a short or straw coated in white powder on patient's person  Patient speaks in short incomprehensible phrases with slurred speech is not able to provide any additional history  None       Past Medical History:   Diagnosis Date    Anxiety     Asthma     Depression     Hypertension        Past Surgical History:   Procedure Laterality Date    KNEE SURGERY Right     TONSILLECTOMY         History reviewed  No pertinent family history  I have reviewed and agree with the history as documented      Social History   Substance Use Topics    Smoking status: Current Every Day Smoker     Types: Cigarettes, E-Cigarettes    Smokeless tobacco: Never Used    Alcohol use Yes      Comment: occassionaly        Review of Systems   Unable to perform ROS: Mental status change       Physical Exam  ED Triage Vitals   Temperature Pulse Respirations Blood Pressure SpO2   12/09/18 1138 12/09/18 1136 12/09/18 1136 12/09/18 1136 12/09/18 1136   98 8 °F (37 1 °C) (!) 126 16 (!) 158/103 94 %      Temp Source Heart Rate Source Patient Position - Orthostatic VS BP Location FiO2 (%)   12/09/18 1138 12/09/18 1145 12/09/18 1145 12/09/18 1145 --   Tympanic Monitor Lying Right arm       Pain Score       12/09/18 1136       No Pain           Orthostatic Vital Signs  Vitals:    12/09/18 1600 12/09/18 1630 12/09/18 1700 12/09/18 1730   BP: (!) 199/138 123/66 119/73 137/67   Pulse: (!) 120 (!) 118 (!) 116 (!) 118   Patient Position - Orthostatic VS:  Lying         Physical Exam   Constitutional: He appears well-developed and well-nourished  Altered  HENT:   Head: Normocephalic and atraumatic  Right Ear: External ear normal    Left Ear: External ear normal    Nose: Nose normal    Mouth/Throat: Oropharynx is clear and moist    Eyes: Pupils are equal, round, and reactive to light  Conjunctivae and EOM are normal  No scleral icterus  Pupils 2+ and reactive bilaterally  Neck: Normal range of motion  Neck supple  No JVD present  No tracheal deviation present  Cardiovascular: Regular rhythm  Exam reveals no gallop and no friction rub  No murmur heard  Tachycardic  Pulmonary/Chest: Effort normal and breath sounds normal  No stridor  No respiratory distress  He has no wheezes  He has no rales  He exhibits no tenderness  Abdominal: Soft  He exhibits no distension  There is no tenderness  There is no rebound and no guarding  Musculoskeletal: Normal range of motion  He exhibits no edema or tenderness  Lymphadenopathy:     He has no cervical adenopathy  Neurological: No cranial nerve deficit or sensory deficit  He exhibits normal muscle tone  GCS 11 (E3, V2, M6)  Slurred speech  Speaks in short incomprehensible phrases  Skin: Skin is warm and dry   He is not diaphoretic  No erythema  No pallor  Psychiatric:   Unable to assess  Nursing note and vitals reviewed  ED Medications  Medications   naloxone (NARCAN) 0 04 mg/mL syringe 0 04 mg (not administered)   acetylcysteine (ACETADOTE) 15,000 mg in dextrose 5 % 200 mL IVPB (not administered)   acetylcysteine (ACETADOTE) 5,000 mg in dextrose 5 % 500 mL IVPB (not administered)   acetylcysteine (ACETADOTE) 10,000 mg in dextrose 5 % 1,000 mL IVPB (not administered)   flumazenil (ROMAZICON) injection 0 5 mg (0 5 mg Intravenous Given 12/9/18 1141)   sodium chloride 0 9 % bolus 1,000 mL (0 mL Intravenous Stopped 12/9/18 1430)   physostigmine salicylate (ANTILIRIUM) injection 2 mg (2 mg Intravenous Given 12/9/18 1745)       Diagnostic Studies  Results Reviewed     Procedure Component Value Units Date/Time    Comprehensive metabolic panel [[id number]]  (Abnormal) Collected:  12/09/18 1625    Lab Status:  Final result Specimen:  Blood from Arm, Left Updated:  12/09/18 1707     Sodium 137 mmol/L      Potassium 4 7 mmol/L      Chloride 104 mmol/L      CO2 27 mmol/L      ANION GAP 6 mmol/L      BUN 10 mg/dL      Creatinine 0 94 mg/dL      Glucose 148 (H) mg/dL      Glucose, Fasting 148 (H) mg/dL      Calcium 9 4 mg/dL      AST 69 (H) U/L       (H) U/L      Alkaline Phosphatase 120 (H) U/L      Total Protein 7 9 g/dL      Albumin 4 1 g/dL      Total Bilirubin 0 20 mg/dL      eGFR 110 ml/min/1 73sq m     Narrative:         National Kidney Disease Education Program recommendations are as follows:  GFR calculation is accurate only with a steady state creatinine  Chronic Kidney disease less than 60 ml/min/1 73 sq  meters  Kidney failure less than 15 ml/min/1 73 sq  meters      Comprehensive metabolic panel [[id number]]  (Abnormal) Collected:  12/09/18 1201    Lab Status:  Final result Specimen:  Blood from Hand, Right Updated:  12/09/18 1440     Sodium 139 mmol/L      Potassium 3 7 mmol/L      Chloride 106 mmol/L      CO2 26 mmol/L      ANION GAP 7 mmol/L      BUN 11 mg/dL      Creatinine 1 04 mg/dL      Glucose 112 mg/dL      Calcium 9 6 mg/dL      AST 60 (H) U/L       (H) U/L      Alkaline Phosphatase 126 (H) U/L      Total Protein 7 6 g/dL      Albumin 4 0 g/dL      Total Bilirubin 0 20 mg/dL      eGFR 97 ml/min/1 73sq m     Narrative:         National Kidney Disease Education Program recommendations are as follows:  GFR calculation is accurate only with a steady state creatinine  Chronic Kidney disease less than 60 ml/min/1 73 sq  meters  Kidney failure less than 15 ml/min/1 73 sq  meters      CBC and differential [[id number]]  (Abnormal) Collected:  12/09/18 1408    Lab Status:  Final result Specimen:  Blood from Hand, Right Updated:  12/09/18 1418     WBC 8 41 Thousand/uL      RBC 4 07 Million/uL      Hemoglobin 12 4 g/dL      Hematocrit 39 7 %      MCV 98 fL      MCH 30 5 pg      MCHC 31 2 (L) g/dL      RDW 13 8 %      MPV 11 7 fL      Platelets 135 Thousands/uL      nRBC 0 /100 WBCs      Neutrophils Relative 58 %      Immat GRANS % 0 %      Lymphocytes Relative 26 %      Monocytes Relative 7 %      Eosinophils Relative 8 (H) %      Basophils Relative 1 %      Neutrophils Absolute 4 87 Thousands/µL      Immature Grans Absolute 0 03 Thousand/uL      Lymphocytes Absolute 2 22 Thousands/µL      Monocytes Absolute 0 59 Thousand/µL      Eosinophils Absolute 0 66 (H) Thousand/µL      Basophils Absolute 0 04 Thousands/µL     Salicylate level [[id number]]  (Abnormal) Collected:  12/09/18 1201    Lab Status:  Final result Specimen:  Blood from Arm, Left Updated:  18/77/36 2703     Salicylate Lvl <3 (L) mg/dL     Acetaminophen level [[id number]]  (Abnormal) Collected:  12/09/18 1201    Lab Status:  Final result Specimen:  Blood from Arm, Left Updated:  12/09/18 1241     Acetaminophen Level <2 (L) ug/mL     Ethanol [[id number]]  (Normal) Collected:  12/09/18 1201    Lab Status:  Final result Specimen:  Blood from Arm, Left Updated: 12/09/18 1234     Ethanol Lvl <3 mg/dL     Rapid drug screen, urine [[id number]]     Lab Status:  No result Specimen:  Urine                  CT head without contrast   Final Result by Yarely Verma MD (12/09 1659)      No acute intracranial abnormality  Workstation performed: [id number]               Procedures  ECG 12 Lead Documentation  Date/Time: 12/9/2018 12:31 PM  Performed by: Judson Guerin  Authorized by: Judson Guerin     Indications / Diagnosis:  Drug overdose  ECG reviewed by me, the ED Provider: yes    Patient location:  ED  Previous ECG:     Previous ECG:  Compared to current    Comparison ECG info:  Normal sinus rhythm, inverted T-waves in lead 3 and AVF    Similarity:  Changes noted    Comparison to cardiac monitor: Yes    Interpretation:     Interpretation: abnormal    Quality:     Tracing quality:  Limited by artifact  Rate:     ECG rate:  122    ECG rate assessment: tachycardic    Rhythm:     Rhythm: sinus tachycardia    QRS:     QRS axis:  Normal    QRS intervals:  Normal  Conduction:     Conduction: normal    ST segments:     ST segments:  Normal  T waves:     T waves: inverted      Inverted:  III and aVF        Phone Consults  ED Phone Contact    ED Course  ED Course as of Dec 09 1828   Sun Dec 09, 2018   1430 WBC: 8 41   1431 Hemoglobin: 12 4   1446 Creatinine: 1 04   1635 Toxicology consult and CT head ordered per request of Dr Isaiah Phan  Per Toxicology Dr Daljit Estes will obtain repeat CMP to trend LFTs      1719 Normal   CT head without contrast   1720 Giving physostigmine per Dr Dahlia Sahni Patient's mother Marshall Zunigahy: [phone]          MDM  Number of Diagnoses or Management Options  Altered mental status: new and requires workup  Antitussive overdose: new and requires workup  Benzodiazepine overdose of undetermined intent, initial encounter: new and requires workup  Depression with suicidal ideation: new and requires workup  Drug-seeking behavior: new and requires workup  Ecstasy abuse [facility]): new and requires workup     27-year-old gentleman presenting to the emergency department after an accidental drug overdose  On arrival patient is GCS 11  He is protecting his airway with SpO2 greater than 95%  His girlfriend tells me he has taken ecstasy, cough syrup, and Xanax  Patient was given 0 4 mg of Narcan with no significant response  This was followed with a 0 5 mg flumazenil with some improvement in mental status  Will obtain EKG, urine drug screen, coma panel  Frequently reassess  Patient's girlfriend and mother at bedside  They discussed with me they are concerned that this may have been intentional drug overdose  Apparently patient has been struggling with depression and has not been taking his fluoxetine  He has been talking about wanting to harm himself  Explained to patient's family he will need to be medically cleared before he can speak with Psychiatry  Given the patient remains significantly altered, will admit him to the hospital as a medical patient  I discussed the patient with the admitting [facility] physician, who accepted the patient to the service of Dr Reji Encinas  CT head ordered and medical toxicology consulted per request of Dr Isaiah Estes of toxicology came and evaluated the patient in the emergency department  Repeat CMP ordered with mildly up trending LFTs  N acetyl cystine started  Patient received 2 mg dose of physostigmine with a temporary improvement in mental status  At this time patient tells me that his drug overdose this morning was not intentional   He denies any suicidal thoughts at this time  He denies taking any acetaminophen-containing medications this morning, but has been taking Tylenol earlier this week for headache  Patient understands he will be admitted to the hospital pending improvement in mental status      CritCare Time    Disposition  Final diagnoses:   Benzodiazepine overdose of undetermined intent, initial encounter   Ecstasy abuse ([address] )   Antitussive overdose   Altered mental status   Drug-seeking behavior   Depression with suicidal ideation   Anticholinergic drug overdose     Time reflects when diagnosis was documented in both MDM as applicable and the Disposition within this note     Time User Action Codes Description Comment    12/9/2018  3:18 PM Orland Getting Add [T42 4X4A] Benzodiazepine overdose of undetermined intent, initial encounter     12/9/2018  3:18 PM Orland Getting Add [F16 10] Ecstasy abuse ([address] )     12/9/2018  3:19 PM Orland Getting Add [V84 2R7K] Antitussive overdose     12/9/2018  3:19 PM Orland Getting Add [R41 82] Altered mental status     12/9/2018  3:20 PM Orland Getting Add [Z76 5] Drug-seeking behavior     12/9/2018  3:21 PM Orland Getting Add [F32 9] Depression     12/9/2018  3:21 PM Orland Getting Add [F32 9,  I72 311] Depression with suicidal ideation     12/9/2018  3:21 PM Orland Getting Remove [F32 9] Depression     12/9/2018  6:21 PM Orland Getting Add [T44 3X1A] Anticholinergic drug overdose       ED Disposition     ED Disposition Condition Comment    Admit  Case was discussed with YOLIS and the patient's admission status was agreed to be Admission Status: observation status to the service of Dr Gutierrez   Follow-up Information    None         Patient's Medications    No medications on file     No discharge procedures on file  ED Provider  Attending physically available and evaluated Vern Glenn JOLLY managed the patient along with the ED Attending      Electronically Signed by         Katia Emery MD  12/09/18 8218

## 2018-12-09 NOTE — ED NOTES
Significant other asking me to bladder scan patient  She is afraid because the patient is getting IV fluids and is sleeping and hasn't urinated  She states she doesn't want his bladder to explode  Dr Dariela Henderson aware  It was explained to the significant other by Dr Dariela Henderson the anticholinergic effect xanax will have on the bladder prior to my conversation with her       Caitlyn Broderick, RN  12/09/18 1222

## 2018-12-09 NOTE — ASSESSMENT & PLAN NOTE
Pt took ecstasy, 120 mL cough syrup containing pseudoephedrine, DM, and bromopheniramine, and Xanax    Also possibly took cocaine  ER talked with tox  Urinary retention protocol - took H1 antagonist  1 to 1  Psych consult, per GF, possibly suicide attempt  UDS pending

## 2018-12-09 NOTE — ED NOTES
Patient opens eyes spontaneously and is able to verbalize however speech remains slurred s/p flumazenil administration at 1141  Quickly falls back to sleep        Monica Hudson RN  12/09/18 5273

## 2018-12-09 NOTE — ED NOTES
Dr Kiera Shrestha toxicology here at bedside to evaluate patient       Vee Rodríguez RN  12/09/18 3896

## 2018-12-09 NOTE — ED NOTES
Patients significant other at bedside asking me to straight cath pt for UDS  I advised her this was not necessary        Leonid Lemos RN  12/09/18 2954

## 2018-12-09 NOTE — H&P
H&P- Vern Elizabeth 1990, 29 y o  male MRN: [id number]    Unit/Bed#: ED 28 Encounter: [id number]    Primary Care Provider: No primary care provider on file  Date and time admitted to hospital: 12/9/2018 11:25 AM        * Overdose   Assessment & Plan    Pt took ecstasy, 120 mL cough syrup containing pseudoephedrine, DM, and bromopheniramine, and Xanax  Also possibly took cocaine  ER talked with tox  Urinary retention protocol - took H1 antagonist  1 to 1  Psych consult, per GF, possibly suicide attempt  UDS pending       Toxic metabolic encephalopathy   Assessment & Plan    2/2 overdose  Supportive care     Hypertension   Assessment & Plan    Per GF, he is noncompliant w/ home meds  Hydral prn     Anxiety   Assessment & Plan    Pt taking Xanax 1 mg tid - confirmed on PDMP  Would not d/c on this after o/d       VTE Prophylaxis: ambulatory  / reason for no mechanical VTE prophylaxis ambulatory   Code Status: Full  POLST: POLST form is not discussed and not completed at this time  Discussion with family: yes    Anticipated Length of Stay:  Patient will be admitted on an Observation basis with an anticipated length of stay of  Less than 2 midnights  Justification for Hospital Stay: above issues    Total Time for Visit, including Counseling / Coordination of Care: 1 hour  Greater than 50% of this total time spent on direct patient counseling and coordination of care  Chief Complaint:   Verl Dark    History of Present Illness:    Vern Elizabeth is a 29 y o  male who presents with an overdose  Found minimally responsive  Per GF, took ecstasy along with 120 mL of cough syrup containing DM, pseudoephedrine, and bromopheniramine  Also stole some of his GF's Xanax - maybe 10-12  Also found with straw with white powder, so possibly took cocaine as well      Review of Systems:    Review of Systems   Unable to perform ROS: Mental status change       Past Medical and Surgical History:     Past Medical History:   Diagnosis Date    Anxiety     Asthma     Depression     Hypertension        Past Surgical History:   Procedure Laterality Date    KNEE SURGERY Right     TONSILLECTOMY         Meds/Allergies:    Prior to Admission medications    Not on File     I have reviewed home medications with patient family member  Allergies: Allergies   Allergen Reactions    Toradol [Ketorolac Tromethamine] Hives       Social History:     Marital Status: Single     Substance Use History:   History   Alcohol Use    Yes     Comment: occassionaly     History   Smoking Status    Current Every Day Smoker    Types: Cigarettes, E-Cigarettes   Smokeless Tobacco    Never Used     History   Drug Use No       Family History:    History reviewed  No pertinent family history  Physical Exam:     Vitals:   Blood Pressure: 125/63 (12/09/18 1630)  Pulse: (!) 119 (12/09/18 1630)  Temperature: 98 8 °F (37 1 °C) (12/09/18 1138)  Temp Source: Tympanic (12/09/18 1138)  Respirations: 16 (12/09/18 1630)  SpO2: 97 % (12/09/18 1630)    Physical Exam   Constitutional: No distress  HENT:   Head: Normocephalic and atraumatic  Eyes: Pupils are equal, round, and reactive to light  Conjunctivae and EOM are normal    Neck: Normal range of motion  Neck supple  Cardiovascular:   Tachy   Pulmonary/Chest: Effort normal and breath sounds normal  He has no wheezes  He has no rales  Abdominal: Soft  Bowel sounds are normal    obese   Musculoskeletal: He exhibits no edema  Neurological:   Responsive only to painful stimuli   Skin: Skin is warm and dry  He is not diaphoretic  Additional Data:     Lab Results: I have personally reviewed pertinent reports          Results from last 7 days  Lab Units 12/09/18  1408   WBC Thousand/uL 8 41   HEMOGLOBIN g/dL 12 4   HEMATOCRIT % 39 7   PLATELETS Thousands/uL 242   NEUTROS PCT % 58   LYMPHS PCT % 26   MONOS PCT % 7   EOS PCT % 8*       Results from last 7 days  Lab Units 12/09/18  1201   SODIUM mmol/L 139   POTASSIUM mmol/L 3 7   CHLORIDE mmol/L 106   CO2 mmol/L 26   BUN mg/dL 11   CREATININE mg/dL 1 04   ANION GAP mmol/L 7   CALCIUM mg/dL 9 6   ALBUMIN g/dL 4 0   TOTAL BILIRUBIN mg/dL 0 20   ALK PHOS U/L 126*   ALT U/L 144*   AST U/L 60*   GLUCOSE RANDOM mg/dL 112                       Imaging: I have personally reviewed pertinent reports  CT head without contrast    (Results Pending)       EKG, Pathology, and Other Studies Reviewed on Admission:   · EKG: Sinus tach    Allscripts / Epic Records Reviewed: Yes     ** Please Note: This note has been constructed using a voice recognition system   **

## 2018-12-10 PROBLEM — F32.A DEPRESSION: Status: ACTIVE | Noted: 2018-12-10

## 2018-12-10 PROBLEM — R00.0 SINUS TACHYCARDIA: Status: ACTIVE | Noted: 2018-12-10

## 2018-12-10 PROBLEM — J45.909 ASTHMA: Status: ACTIVE | Noted: 2018-12-10

## 2018-12-10 LAB
ALBUMIN SERPL BCP-MCNC: 3.5 G/DL (ref 3.5–5)
ALP SERPL-CCNC: 96 U/L (ref 46–116)
ALT SERPL W P-5'-P-CCNC: 130 U/L (ref 12–78)
ANION GAP SERPL CALCULATED.3IONS-SCNC: 8 MMOL/L (ref 4–13)
AST SERPL W P-5'-P-CCNC: 48 U/L (ref 5–45)
ATRIAL RATE: 121 BPM
ATRIAL RATE: 340 BPM
BILIRUB SERPL-MCNC: 0.35 MG/DL (ref 0.2–1)
BUN SERPL-MCNC: 7 MG/DL (ref 5–25)
CALCIUM SERPL-MCNC: 8.5 MG/DL (ref 8.3–10.1)
CHLORIDE SERPL-SCNC: 107 MMOL/L (ref 100–108)
CO2 SERPL-SCNC: 27 MMOL/L (ref 21–32)
CREAT SERPL-MCNC: 0.82 MG/DL (ref 0.6–1.3)
ERYTHROCYTE [DISTWIDTH] IN BLOOD BY AUTOMATED COUNT: 14 % (ref 11.6–15.1)
GFR SERPL CREATININE-BSD FRML MDRD: 120 ML/MIN/1.73SQ M
GLUCOSE SERPL-MCNC: 120 MG/DL (ref 65–140)
HAV IGM SER QL: NORMAL
HBV CORE IGM SER QL: NORMAL
HBV SURFACE AG SER QL: NORMAL
HCT VFR BLD AUTO: 38 % (ref 36.5–49.3)
HCV AB SER QL: NORMAL
HGB BLD-MCNC: 12.1 G/DL (ref 12–17)
MAGNESIUM SERPL-MCNC: 2.1 MG/DL (ref 1.6–2.6)
MCH RBC QN AUTO: 30.6 PG (ref 26.8–34.3)
MCHC RBC AUTO-ENTMCNC: 31.8 G/DL (ref 31.4–37.4)
MCV RBC AUTO: 96 FL (ref 82–98)
P AXIS: 62 DEGREES
PHOSPHATE SERPL-MCNC: 2.4 MG/DL (ref 2.7–4.5)
PLATELET # BLD AUTO: 242 THOUSANDS/UL (ref 149–390)
PMV BLD AUTO: 11.1 FL (ref 8.9–12.7)
POTASSIUM SERPL-SCNC: 3.4 MMOL/L (ref 3.5–5.3)
PR INTERVAL: 126 MS
PROT SERPL-MCNC: 7 G/DL (ref 6.4–8.2)
QRS AXIS: 20 DEGREES
QRS AXIS: 27 DEGREES
QRSD INTERVAL: 100 MS
QRSD INTERVAL: 98 MS
QT INTERVAL: 310 MS
QT INTERVAL: 322 MS
QTC INTERVAL: 441 MS
QTC INTERVAL: 457 MS
RBC # BLD AUTO: 3.95 MILLION/UL (ref 3.88–5.62)
SODIUM SERPL-SCNC: 142 MMOL/L (ref 136–145)
T WAVE AXIS: -26 DEGREES
T WAVE AXIS: -9 DEGREES
VENTRICULAR RATE: 121 BPM
VENTRICULAR RATE: 122 BPM
WBC # BLD AUTO: 9.44 THOUSAND/UL (ref 4.31–10.16)

## 2018-12-10 PROCEDURE — 80074 ACUTE HEPATITIS PANEL: CPT | Performed by: GENERAL PRACTICE

## 2018-12-10 PROCEDURE — 83735 ASSAY OF MAGNESIUM: CPT | Performed by: GENERAL PRACTICE

## 2018-12-10 PROCEDURE — 99232 SBSQ HOSP IP/OBS MODERATE 35: CPT | Performed by: INTERNAL MEDICINE

## 2018-12-10 PROCEDURE — 99225 PR SBSQ OBSERVATION CARE/DAY 25 MINUTES: CPT | Performed by: EMERGENCY MEDICINE

## 2018-12-10 PROCEDURE — 84100 ASSAY OF PHOSPHORUS: CPT | Performed by: GENERAL PRACTICE

## 2018-12-10 PROCEDURE — 80053 COMPREHEN METABOLIC PANEL: CPT | Performed by: EMERGENCY MEDICINE

## 2018-12-10 PROCEDURE — 94640 AIRWAY INHALATION TREATMENT: CPT

## 2018-12-10 PROCEDURE — 85027 COMPLETE CBC AUTOMATED: CPT | Performed by: GENERAL PRACTICE

## 2018-12-10 PROCEDURE — 99244 OFF/OP CNSLTJ NEW/EST MOD 40: CPT | Performed by: PSYCHIATRY & NEUROLOGY

## 2018-12-10 PROCEDURE — 94760 N-INVAS EAR/PLS OXIMETRY 1: CPT

## 2018-12-10 PROCEDURE — 93010 ELECTROCARDIOGRAM REPORT: CPT | Performed by: INTERNAL MEDICINE

## 2018-12-10 RX ORDER — OMEPRAZOLE 20 MG/1
20 CAPSULE, DELAYED RELEASE ORAL DAILY
Status: ON HOLD | COMMUNITY
End: 2018-12-18

## 2018-12-10 RX ORDER — METHOCARBAMOL 500 MG/1
500 TABLET, FILM COATED ORAL
COMMUNITY
End: 2018-12-18 | Stop reason: HOSPADM

## 2018-12-10 RX ORDER — HYDROCHLOROTHIAZIDE 25 MG/1
25 TABLET ORAL 2 TIMES DAILY
Status: DISCONTINUED | OUTPATIENT
Start: 2018-12-10 | End: 2018-12-11 | Stop reason: HOSPADM

## 2018-12-10 RX ORDER — FLUTICASONE FUROATE AND VILANTEROL 100; 25 UG/1; UG/1
1 POWDER RESPIRATORY (INHALATION)
Status: DISCONTINUED | OUTPATIENT
Start: 2018-12-10 | End: 2018-12-11 | Stop reason: HOSPADM

## 2018-12-10 RX ORDER — HYDROCHLOROTHIAZIDE 12.5 MG/1
12.5 TABLET ORAL 2 TIMES DAILY
Status: DISCONTINUED | OUTPATIENT
Start: 2018-12-10 | End: 2018-12-10

## 2018-12-10 RX ORDER — MONTELUKAST SODIUM 10 MG/1
10 TABLET ORAL
Status: DISCONTINUED | OUTPATIENT
Start: 2018-12-10 | End: 2018-12-11 | Stop reason: HOSPADM

## 2018-12-10 RX ORDER — PANTOPRAZOLE SODIUM 20 MG/1
20 TABLET, DELAYED RELEASE ORAL
Status: DISCONTINUED | OUTPATIENT
Start: 2018-12-11 | End: 2018-12-11 | Stop reason: HOSPADM

## 2018-12-10 RX ORDER — SERTRALINE HYDROCHLORIDE 100 MG/1
150 TABLET, FILM COATED ORAL DAILY
COMMUNITY
End: 2018-12-18 | Stop reason: HOSPADM

## 2018-12-10 RX ORDER — LISINOPRIL 20 MG/1
20 TABLET ORAL 2 TIMES DAILY
Status: ON HOLD | COMMUNITY
End: 2018-12-18

## 2018-12-10 RX ORDER — ASPIRIN 325 MG
325 TABLET ORAL DAILY
COMMUNITY
End: 2018-12-18 | Stop reason: HOSPADM

## 2018-12-10 RX ORDER — MONTELUKAST SODIUM 10 MG/1
10 TABLET ORAL
Status: ON HOLD | COMMUNITY
End: 2018-12-18

## 2018-12-10 RX ORDER — HYDROCHLOROTHIAZIDE 25 MG/1
25 TABLET ORAL 2 TIMES DAILY
Status: ON HOLD | COMMUNITY
End: 2018-12-18

## 2018-12-10 RX ORDER — ALBUTEROL SULFATE 2.5 MG/3ML
2.5 SOLUTION RESPIRATORY (INHALATION) AS NEEDED
COMMUNITY
End: 2019-10-03 | Stop reason: HOSPADM

## 2018-12-10 RX ORDER — IBUPROFEN 400 MG/1
400 TABLET ORAL ONCE
Status: COMPLETED | OUTPATIENT
Start: 2018-12-10 | End: 2018-12-10

## 2018-12-10 RX ORDER — OXYCODONE HYDROCHLORIDE AND ACETAMINOPHEN 5; 325 MG/1; MG/1
1 TABLET ORAL EVERY 6 HOURS PRN
COMMUNITY
End: 2018-12-18 | Stop reason: HOSPADM

## 2018-12-10 RX ORDER — CARVEDILOL 6.25 MG/1
6.25 TABLET ORAL 2 TIMES DAILY WITH MEALS
Status: ON HOLD | COMMUNITY
End: 2018-12-18

## 2018-12-10 RX ORDER — BUTALBITAL/ASPIRIN/CAFFEINE 50-325-40
1 CAPSULE ORAL 2 TIMES DAILY
COMMUNITY
End: 2018-12-18 | Stop reason: HOSPADM

## 2018-12-10 RX ORDER — ALPRAZOLAM 0.5 MG/1
1 TABLET ORAL 3 TIMES DAILY PRN
Status: DISCONTINUED | OUTPATIENT
Start: 2018-12-10 | End: 2018-12-11 | Stop reason: HOSPADM

## 2018-12-10 RX ORDER — LISINOPRIL 20 MG/1
20 TABLET ORAL 2 TIMES DAILY
Status: DISCONTINUED | OUTPATIENT
Start: 2018-12-10 | End: 2018-12-11 | Stop reason: HOSPADM

## 2018-12-10 RX ORDER — DICYCLOMINE HYDROCHLORIDE 10 MG/1
10 CAPSULE ORAL
COMMUNITY
End: 2018-12-18 | Stop reason: HOSPADM

## 2018-12-10 RX ORDER — LIDOCAINE 50 MG/G
1 PATCH TOPICAL DAILY
Status: DISCONTINUED | OUTPATIENT
Start: 2018-12-10 | End: 2018-12-11 | Stop reason: HOSPADM

## 2018-12-10 RX ORDER — TRAMADOL HYDROCHLORIDE 50 MG/1
50 TABLET ORAL EVERY 6 HOURS PRN
Status: COMPLETED | OUTPATIENT
Start: 2018-12-10 | End: 2018-12-11

## 2018-12-10 RX ORDER — ALPRAZOLAM 1 MG/1
1 TABLET ORAL 3 TIMES DAILY PRN
COMMUNITY
End: 2018-12-18 | Stop reason: HOSPADM

## 2018-12-10 RX ADMIN — HYDROCHLOROTHIAZIDE 25 MG: 25 TABLET ORAL at 17:12

## 2018-12-10 RX ADMIN — LEVALBUTEROL 1.25 MG: 1.25 SOLUTION, CONCENTRATE RESPIRATORY (INHALATION) at 20:55

## 2018-12-10 RX ADMIN — MONTELUKAST SODIUM 10 MG: 10 TABLET, FILM COATED ORAL at 21:02

## 2018-12-10 RX ADMIN — TRAMADOL HYDROCHLORIDE 50 MG: 50 TABLET, COATED ORAL at 21:02

## 2018-12-10 RX ADMIN — ACETYLCYSTEINE 10000 MG: 6 INJECTION, SOLUTION INTRAVENOUS at 01:17

## 2018-12-10 RX ADMIN — LISINOPRIL 20 MG: 20 TABLET ORAL at 17:12

## 2018-12-10 RX ADMIN — SODIUM CHLORIDE 50 ML/HR: 0.9 INJECTION, SOLUTION INTRAVENOUS at 17:12

## 2018-12-10 RX ADMIN — LIDOCAINE 1 PATCH: 50 PATCH CUTANEOUS at 13:24

## 2018-12-10 RX ADMIN — IPRATROPIUM BROMIDE 0.5 MG: 0.5 SOLUTION RESPIRATORY (INHALATION) at 13:16

## 2018-12-10 RX ADMIN — LEVALBUTEROL 1.25 MG: 1.25 SOLUTION, CONCENTRATE RESPIRATORY (INHALATION) at 07:29

## 2018-12-10 RX ADMIN — LISINOPRIL 20 MG: 20 TABLET ORAL at 13:25

## 2018-12-10 RX ADMIN — IPRATROPIUM BROMIDE 0.5 MG: 0.5 SOLUTION RESPIRATORY (INHALATION) at 07:30

## 2018-12-10 RX ADMIN — IPRATROPIUM BROMIDE 0.5 MG: 0.5 SOLUTION RESPIRATORY (INHALATION) at 20:54

## 2018-12-10 RX ADMIN — LEVALBUTEROL 1.25 MG: 1.25 SOLUTION, CONCENTRATE RESPIRATORY (INHALATION) at 13:16

## 2018-12-10 RX ADMIN — FLUTICASONE FUROATE AND VILANTEROL TRIFENATATE 1 PUFF: 100; 25 POWDER RESPIRATORY (INHALATION) at 15:00

## 2018-12-10 RX ADMIN — IBUPROFEN 400 MG: 400 TABLET ORAL at 23:00

## 2018-12-10 NOTE — ASSESSMENT & PLAN NOTE
Trending down  Status post an acetylcysteine protocol per toxicology for suspected Tylenol overdose  Acute Viral hepatitis panel negative  Trend CMP

## 2018-12-10 NOTE — UTILIZATION REVIEW
Initial Clinical Review    Admission: Date/Time/Statement: 12/09/18 @ 1556 Observation Written     Orders Placed This Encounter   Procedures    Place in Observation (expected length of stay for this patient is less than two midnights)     Standing Status:   Standing     Number of Occurrences:   1     Order Specific Question:   Admitting Physician     Answer:   Flavia Jensen [[id number]]     Order Specific Question:   Level of Care     Answer:   Med Surg [16]         ED: Date/Time/Mode of Arrival:   ED Arrival Information     Expected Arrival Acuity Means of Arrival Escorted By Service Admission Type    - 12/9/2018 11:25 Immediate Ambulance [facility] Emergency    Arrival Complaint    overdose          Chief Complaint:   Chief Complaint   Patient presents with    Overdose - Accidental     pt lethargic, slurred speech, as per EMS pts significant other reports pt took xanax and drank cough medicine  Straw with white powder in it found on pt's posession  History of Illness: Patient is a 70-year-old gentleman with a history of asthma and hypertension who presents to the emergency department for an accidental drug overdose  He arrives to the emergency department able to follow commands but is not able to provide any meaningful history  I was able to speak with the patient's girlfriend Robyn Hernandez, who lives with him  She tells me patient took a tablet of ecstasy around 12:00 a m  last night  He was also read online about ways to improve the Ecstasy high, and drink a 120 mL bottle of cough medicine containing pseudoephedrine, dextromethophran, and brompheneramine  She also states that he took several of her tablets of Xanax and believes he took between 10 and 12 mg while she was in the shower  After she came out she noted his gait was ataxic and he was slurring his speech  He was speaking nonsensical phrases  She did not witness him fall or hit his head  She called EMS    When EMS arrived they found a short or straw coated in white powder on patient's person  Patient speaks in short incomprehensible phrases with slurred speech is not able to provide any additional history         ED Vital Signs:   ED Triage Vitals   Temperature Pulse Respirations Blood Pressure SpO2   12/09/18 1138 12/09/18 1136 12/09/18 1136 12/09/18 1136 12/09/18 1136   98 8 °F (37 1 °C) (!) 126 16 (!) 158/103 94 %      Temp Source Heart Rate Source Patient Position - Orthostatic VS BP Location FiO2 (%)   12/09/18 1138 12/09/18 1145 12/09/18 1145 12/09/18 1145 --   Tympanic Monitor Lying Right arm       Pain Score       12/09/18 1136       No Pain        Wt Readings from Last 1 Encounters:   08/22/18 (!) 140 kg (308 lb 13 8 oz)       Vital Signs (abnormal):   Date/Time  Pulse  Resp  BP  SpO2  O2 Device   12/10/18 0755   119  20  162/70  96 %  None (Room air)   12/10/18 0525   117  --   154/102  --  --   12/10/18 0400   116  --   161/106  --  --   12/10/18 0200  --  --   168/108  --  --   12/10/18 0000  --  --   180/90  --  --   12/09/18 2323  --  --   173/114  --  --   12/09/18 2135   119  --   178/120  97 %  --   12/09/18 2117  --  --   180/123  --  --   12/09/18 2116  --  --   200/130  --  --   12/09/18 2045  --  --   220/150  --  --   12/09/18 1900   124  --  132/80  --  --   12/09/18 1849   119  16  120/67  97 %  Nasal cannula   12/09/18 1830   122  --  120/67  --  --   12/09/18 1730   118  --  137/67  97 %  --   12/09/18 1700   116  --  119/73  99 %  --   12/09/18 1630   118  16  123/66  99 %  Nasal cannula   12/09/18 1600   120  --   199/138  98 %  --   12/09/18 1530   114  --   193/102  97 %  --   12/09/18 1529   113  16   197/87  97 %  Nasal cannula   12/09/18 1500   112  --   197/87  93 %  --   12/09/18 1439   115  14   187/88  96 %  None (Room air)   12/09/18 1430   116  --   187/88  94 %  --   12/09/18 1400   118  --   192/88  94 %  --   12/09/18 1348   120  14  144/75  96 %  None (Room air)   12/09/18 1330   122  -- 144/75  94 %  --   12/09/18 1300   124  --   138/104  94 %  --   12/09/18 1245   122  14  130/88  96 %  None (Room air)   12/09/18 1230   126  --  130/88  94 %  --   12/09/18 1200   116  --  148/85  96 %  --   12/09/18 1145   120  14  142/77  96 %  None (Room air)     Abnormal Labs/Diagnostic Test Results:   ALK PHOS 126*   *   AST 60*     Amph/Meth UR Positive     Benzodiazepine Urine Positive     Cocaine Urine Positive     Opiate Urine Positive     PCP Ur Positive     THC Urine Positive       EKG:  ST  CT HEAD:  WNL    ED Treatment:   Medication Administration from 12/09/2018 1125 to 12/09/2018 1939       Date/Time Order Dose Route Action     12/09/2018 1141 flumazenil (ROMAZICON) injection 0 5 mg 0 5 mg Intravenous Given     12/09/2018 1204 sodium chloride 0 9 % bolus 1,000 mL 1,000 mL Intravenous New Bag     12/09/2018 1745 physostigmine salicylate (ANTILIRIUM) injection 2 mg 2 mg Intravenous Given     12/09/2018 1855 acetylcysteine (ACETADOTE) 15,000 mg in dextrose 5 % 200 mL IVPB 15,000 mg Intravenous New Bag          Past Medical/Surgical History: Active Ambulatory Problems     Diagnosis Date Noted    No Active Ambulatory Problems     Resolved Ambulatory Problems     Diagnosis Date Noted    No Resolved Ambulatory Problems     Past Medical History:   Diagnosis Date    Anxiety     Asthma     Depression     Hypertension        Admitting Diagnosis: Overdose [T50 901A]  Altered mental status [R41 82]  Drug-seeking behavior [Z76 5]  Anticholinergic drug overdose [T44 3X1A]  Ecstasy abuse ([facility] [address] ) [F16 10]  Antitussive overdose [T48 3X1A]  Benzodiazepine overdose of undetermined intent, initial encounter [T42 4X4A]  Depression with suicidal ideation [F32 9, R45 851]    Age/Sex: 29 y o  male    Assessment/Plan:   Overdose   Assessment & Plan     Pt took ecstasy, 120 mL cough syrup containing pseudoephedrine, DM, and bromopheniramine, and Xanax    Also possibly took cocaine  ER talked with tox  Urinary retention protocol - took H1 antagonist  1 to 1  Psych consult, per GF, possibly suicide attempt  UDS pending         Toxic metabolic encephalopathy   Assessment & Plan     2/2 overdose  Supportive care      Hypertension   Assessment & Plan     Per GF, he is noncompliant w/ home meds  Hydral prn      Anxiety   Assessment & Plan     Pt taking Xanax 1 mg tid - confirmed on PDMP  Would not d/c on this after o/d         VTE Prophylaxis: ambulatory  / reason for no mechanical VTE prophylaxis ambulatory   Anticipated Length of Stay:  Patient will be admitted on an Observation basis with an anticipated length of stay of  Less than 2 midnights     Justification for Hospital Stay: above issues    Admission Orders:  Telemetry  Cardiac diet  OOB as fab  Continual observation  Consult psychiatry    Scheduled Meds:   Current Facility-Administered Medications:  acetylcysteine 10,000 mg Intravenous Once   albuterol 2 puff Inhalation Q4H PRN   hydrALAZINE 5 mg Intravenous Q6H PRN   ipratropium 0 5 mg Nebulization TID   levalbuterol 1 25 mg Nebulization TID   LORazepam 1 mg Intravenous Q30 Min PRN   naloxone 0 04 mg Intravenous Once   nicotine 1 patch Transdermal Daily   sodium chloride 50 mL/hr Intravenous Continuous     Continuous Infusions:   sodium chloride 50 mL/hr Last Rate: 50 mL/hr (12/09/18 2057)     PRN Meds: albuterol    hydrALAZINE 5mg IV x2 thus far    LORazepam

## 2018-12-10 NOTE — PROGRESS NOTES
Pt girlfriend states that she left pts  Frida Medrano in the ED  Notified Anastacio Koyanagi RN from ED  Room was cleaned and belongings were taken with girlfriend  Asked pt's girlfriend to double check her things to see if she had the wallet  Pts girlfriend states," I left the wallet downstairs  I told the nurse about it"  Notified ED again that if anything shows up to page me at my extension

## 2018-12-10 NOTE — SOCIAL WORK
Pt admitted to [facility] with multiple drug overdose  Psych eval completed--pt signed 201  Anticipate d/c to inpt psych once medically stable

## 2018-12-10 NOTE — PROGRESS NOTES
/114 manually  Notified SLIM PA [place]  Modified Hydralazine to Q4H  Okay to give dose now  Also spoke with MD Covarrubias Speaks from Toxicology  Metoprolol not advised for BP but will continue with hydralazine as needed  Will speak to YOLIS about ordering Ativan prn  Pt stable and sleeping  Will continue to monitor

## 2018-12-10 NOTE — ASSESSMENT & PLAN NOTE
Poly substance overdose  Patient denies suicidal ideation but it was unintentional overdose  Pt took ecstasy, 120 mL cough syrup containing pseudoephedrine, DM, and bromopheniramine, and Xanax  Also possibly took cocaine  Appreciate toxicology input  Urinary retention protocol - took H1 antagonist  1 to 1  Psych consult,>> patient is 201 per psych, continue with one-to-one and suicide precautions
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
## 2018-12-10 NOTE — PROGRESS NOTES
Progress Note - Veronica Gann 1990, 29 y o  male MRN: [id number]    Unit/Bed#: [facility] 616-01 Encounter: [id number]    Primary Care Provider: No primary care provider on file  Date and time admitted to hospital: 12/9/2018 11:25 AM        Sinus tachycardia   Assessment & Plan    Likely related to overdose as above  Continue to monitor for now  Check TSH  Depression   Assessment & Plan    Continue with home dose zoloft and prn xanax  Asthma   Assessment & Plan    Continue with p r n  Albuterol and Atrovent  Restart home dose p o  Transaminitis   Assessment & Plan    Trending down  Status post an acetylcysteine protocol per toxicology for suspected Tylenol overdose  Acute Viral hepatitis panel negative  Trend CMP  Hypertension   Assessment & Plan    Noncompliant on medications at home  Continue with home dose lisinopril, hydrochlorothiazide  Hydralazine p r n        Anxiety   Assessment & Plan    Pt taking Xanax 1 mg tid - confirmed on PDMP  Continue with home dose benzo p r n  Toxic metabolic encephalopathy   Assessment & Plan    2/2 overdose  Supportive care     * Overdose   Assessment & Plan    Poly substance overdose  Patient denies suicidal ideation but it was unintentional overdose  Pt took ecstasy, 120 mL cough syrup containing pseudoephedrine, DM, and bromopheniramine, and Xanax  Also possibly took cocaine  Appreciate toxicology input  Urinary retention protocol - took H1 antagonist  1 to 1  Psych consult,>> patient is 201 per psych, continue with one-to-one and suicide precautions  VTE Pharmacologic Prophylaxis:   Pharmacologic: Enoxaparin (Lovenox)  Mechanical VTE Prophylaxis in Place: Yes    Patient Centered Rounds: I have performed bedside rounds with nursing staff today      Discussions with Specialists or Other Care Team Provider:  Psychiatrist, toxicology    Education and Discussions with Family / Patient:  Discussed plan of care with the patient    Time Spent for Care: 30 minutes  More than 50% of total time spent on counseling and coordination of care as described above  Current Length of Stay: 0 day(s)    Current Patient Status: Observation   Certification Statement: The patient will continue to require additional inpatient hospital stay due to Not medically stable    Discharge Plan:  When medically stable    Code Status: Level 1 - Full Code      Subjective:   Patient reports headache without any neck pain or fever, for which he takes Fioricet at home  Also reports right knee pain and sternal pain after fall and compression yesterday  Objective:     Vitals:   Temp (24hrs), Av 8 °F (36 6 °C), Min:97 3 °F (36 3 °C), Max:98 1 °F (36 7 °C)    Temp:  [97 3 °F (36 3 °C)-98 1 °F (36 7 °C)] 97 3 °F (36 3 °C)  HR:  [112-124] 114  Resp:  [16-20] 20  BP: (120-220)/() 166/72  SpO2:  [84 %-98 %] 96 %  There is no height or weight on file to calculate BMI  Input and Output Summary (last 24 hours): Intake/Output Summary (Last 24 hours) at 12/10/18 1817  Last data filed at 12/10/18 1700   Gross per 24 hour   Intake             1320 ml   Output             1800 ml   Net             -480 ml       Physical Exam:     Physical Exam   Constitutional: He is oriented to person, place, and time  No distress  Eyes: Pupils are equal, round, and reactive to light  Cardiovascular: Normal rate, regular rhythm, normal heart sounds and intact distal pulses  No murmur heard  Pulmonary/Chest: No respiratory distress  He has wheezes  He has no rales  Abdominal: Soft  Bowel sounds are normal  He exhibits no distension  There is no tenderness  Musculoskeletal: He exhibits no edema  Neurological: He is alert and oriented to person, place, and time  No focal motor deficits   Skin: Skin is warm           Additional Data:     Labs:      Results from last 7 days  Lab Units 12/10/18  0626 18  1408   WBC Thousand/uL 9 44 8 41   HEMOGLOBIN g/dL 12 1 12 4   HEMATOCRIT % 38 0 39 7   PLATELETS Thousands/uL 242 242   NEUTROS PCT %  --  58   LYMPHS PCT %  --  26   MONOS PCT %  --  7   EOS PCT %  --  8*       Results from last 7 days  Lab Units 12/10/18  0626   SODIUM mmol/L 142   POTASSIUM mmol/L 3 4*   CHLORIDE mmol/L 107   CO2 mmol/L 27   BUN mg/dL 7   CREATININE mg/dL 0 82   ANION GAP mmol/L 8   CALCIUM mg/dL 8 5   ALBUMIN g/dL 3 5   TOTAL BILIRUBIN mg/dL 0 35   ALK PHOS U/L 96   ALT U/L 130*   AST U/L 48*   GLUCOSE RANDOM mg/dL 120                           * I Have Reviewed All Lab Data Listed Above  * Additional Pertinent Lab Tests Reviewed: All Labs Within Last 24 Hours Reviewed    Imaging:    CT head without contrast   Final Result by Yo Duffy MD (12/09 1659)      No acute intracranial abnormality                    Workstation performed: [id number]             Recent Cultures (last 7 days):           Last 24 Hours Medication List:     Current Facility-Administered Medications:  albuterol 2 puff Inhalation Q4H PRN Massiel Peng DO    ALPRAZolam 1 mg Oral TID PRN Martin Magana MD    fluticasone-vilanterol 1 puff Inhalation Daily Martin Magana MD    hydrochlorothiazide 25 mg Oral BID Martin Magana MD    ipratropium 0 5 mg Nebulization TID Massiel Peng DO    levalbuterol 1 25 mg Nebulization TID Massiel Peng DO    lidocaine 1 patch Topical Daily Martin Magana MD    lisinopril 20 mg Oral BID Martin Magana MD    montelukast 10 mg Oral HS Martin Magana MD    naloxone 0 04 mg Intravenous Once Mary Caraballo MD    nicotine 1 patch Transdermal Daily Massiel Peng DO    [START ON 12/11/2018] pantoprazole 20 mg Oral Early Morning Martin Magana MD    [START ON 12/11/2018] sertraline 100 mg Oral Daily Martin Magana MD    sodium chloride 50 mL/hr Intravenous Continuous Massiel Peng DO Last Rate: 50 mL/hr (12/10/18 1712)        Today, Patient Was Seen By: Martin Magana MD    ** Please Note: Dictation voice to text software may have been used in the creation of this document   **

## 2018-12-10 NOTE — PROGRESS NOTES
Progress Note - Medical Toxicology  Yeyo Blake 29 y o  male MRN: [id number]  Unit/Bed#: [facility] 427-72 Encounter: [id number]            Reason for current consult:  Multiple drug overdose    ASSESSMENT:  26-year-old male with multiple drug overdose  1  Anticholinergic toxidrome, secondary to brompheniramine, with continued associated tachycardia   2  Sedative hypnotic toxidrome, secondary to alprazolam  3  Cocaine abuse  4  Dextromethorphan abuse  5  Cannabis abuse  6  Nonacute acetaminophen overdose  7  Transaminitis   8  Depression     RECOMMENDATIONS:  Please continue supportive care and have Psychiatry evaluate patient  His mental status is normal   He is tolerating PO  He ambulates well  He does have persistent tachycardia which will likely resolve  The p r n  Benzodiazepine ordered may be discontinued  His transaminitis stable and improving and is N-acetylcysteine can be discontinued  Provided there are no medical concerns, he may be cleared from a toxicological standpoint at this time  He does report he has some mild wheezing associated with his asthma  In addition, if there is any abdominal pain on reevaluation, Jeff Jeni may be considered  Psychiatric evaluation is indicated     Subjective:   Patient has slept well through the night and his hypertensive episodes have improved  His tachycardia is persistent in the 120s  His mental status is normal and he ate breakfast this morning  He denies any symptoms  Objective:     Physical Exam   Constitutional: He is oriented to person, place, and time  He appears well-developed and well-nourished  HENT:   Head: Normocephalic and atraumatic  Eyes: Pupils are equal, round, and reactive to light  Conjunctivae and EOM are normal    Neck: Normal range of motion  Cardiovascular: Regular rhythm  Tachycardia present  Pulmonary/Chest: Effort normal  No respiratory distress  He has wheezes  Abdominal: Soft   Bowel sounds are normal  There is no tenderness  Musculoskeletal: Normal range of motion  Neurological: He is alert and oriented to person, place, and time  Skin: Skin is warm and dry  Psychiatric: His speech is normal  Cognition and memory are normal  Cognition and memory are not impaired  He expresses impulsivity and inappropriate judgment  He exhibits a depressed mood  He expresses no suicidal ideation  Nursing note and vitals reviewed  Vitals: Blood pressure 162/70, pulse (!) 119, temperature 97 9 °F (36 6 °C), temperature source Oral, resp  rate 20, SpO2 97 %  Intake/Output Summary (Last 24 hours) at 12/10/18 1025  Last data filed at 12/10/18 0444   Gross per 24 hour   Intake             1000 ml   Output              900 ml   Net              100 ml         Invasive Devices     Peripheral Intravenous Line            Peripheral IV 12/09/18 Left Antecubital less than 1 day                Results for Brayden Gómez (MRN [id number]) as of 12/10/2018 10:23   Ref   Range 3/1/2016 11:57 12/9/2018 12:01 12/9/2018 14:08 12/9/2018 16:25 12/10/2018 06:26   Sodium Latest Ref Range: 136 - 145 mmol/L  139  137 142   Potassium Latest Ref Range: 3 5 - 5 3 mmol/L  3 7  4 7 3 4 (L)   Chloride Latest Ref Range: 100 - 108 mmol/L  106  104 107   CO2 Latest Ref Range: 21 - 32 mmol/L  26  27 27   Anion Gap Latest Ref Range: 4 - 13 mmol/L  7  6 8   BUN Latest Ref Range: 5 - 25 mg/dL  11  10 7   Creatinine Latest Ref Range: 0 60 - 1 30 mg/dL  1 04  0 94 0 82   Glucose, Random Latest Ref Range: 65 - 140 mg/dL  112  148 (H) 120   GLUCOSE FASTING Latest Ref Range: 65 - 99 mg/dL    148 (H)    Calcium Latest Ref Range: 8 3 - 10 1 mg/dL  9 6  9 4 8 5   AST Latest Ref Range: 5 - 45 U/L  60 (H)  69 (H) 48 (H)   ALT Latest Ref Range: 12 - 78 U/L  144 (H)  149 (H) 130 (H)   Alkaline Phosphatase Latest Ref Range: 46 - 116 U/L  126 (H)  120 (H) 96   Total Protein Latest Ref Range: 6 4 - 8 2 g/dL  7 6  7 9 7 0   Albumin Latest Ref Range: 3 5 - 5 0 g/dL  4 0  4 1 3  5   TOTAL BILIRUBIN Latest Ref Range: 0 20 - 1 00 mg/dL  0 20  0 20 0 35   eGFR Latest Units: ml/min/1 73sq m  97  110 120

## 2018-12-10 NOTE — PROGRESS NOTES
Pt /150 manually  Given prn hydralazine  Pt drowsy  AxO3 with slurred speech  Pt has audible expiratory wheezes  Notified YOLIS Holman  Will recheck BP if still high will order prn Lopressor 2 5mg  Ordered respiratory protocol  Viji Perez RT assessed pt bedside and gave breathing treatment  Will continue to monitor

## 2018-12-10 NOTE — ASSESSMENT & PLAN NOTE
Pt taking Xanax 1 mg tid - confirmed on PDMP  Continue with home dose benzo p r n  [place] Fil[place]

## 2018-12-10 NOTE — QUICK NOTE
Spoke with Dr Anup Mills with Toxicology, appreciate input  Tachycardic and hypertensive secondary to polypharmacy and multiple drug overdose  Ativan 1mg q 30 minutes PRN for agitation, HR > 130, BP > 200 and call provider if no response to treatment 15 minutes after administering  Hydralazine 5mg q 6 hrs PRN for SBP > 190  Continue to monitor

## 2018-12-10 NOTE — PLAN OF CARE
CARDIOVASCULAR - ADULT     Maintains optimal cardiac output and hemodynamic stability Progressing     Absence of cardiac dysrhythmias or at baseline rhythm Progressing        HEMATOLOGIC - ADULT     Maintains hematologic stability Progressing        METABOLIC, FLUID AND ELECTROLYTES - ADULT     Electrolytes maintained within normal limits Progressing     Fluid balance maintained Progressing     Glucose maintained within target range Progressing        NEUROSENSORY - ADULT     Achieves stable or improved neurological status Progressing     Absence of seizures Progressing     Remains free of injury related to seizures activity Progressing     Achieves maximal functionality and self care Progressing        RESPIRATORY - ADULT     Achieves optimal ventilation and oxygenation Progressing        SUBSTANCE USE/ABUSE     Will have no detox symptoms and will verbalize plan for changing substance-related behavior Progressing     By discharge, will develop insight into their chemical dependency and sustain motivation to continue in recovery Progressing     By discharge, patient will have ongoing treatment plan addressing chemical dependency Progressing

## 2018-12-10 NOTE — ASSESSMENT & PLAN NOTE
Noncompliant on medications at home  Continue with home dose lisinopril, hydrochlorothiazide  Hydralazine p r sukh Da Silva

## 2018-12-10 NOTE — RESPIRATORY THERAPY NOTE
RT Protocol Note  Merrilyn Essex 29 y o  male MRN: [id number]  Unit/Bed#: [facility] 877-50 Encounter: [id number]    Assessment    Principal Problem:    Overdose  Active Problems:    Toxic metabolic encephalopathy    Anxiety    Hypertension    Transaminitis      Home Pulmonary Medications:  Xopenex nebs and Albuterol MDI PRN       Past Medical History:   Diagnosis Date    Anxiety     Asthma     Depression     Hypertension      Social History     Social History    Marital status: Single     Spouse name: N/A    Number of children: N/A    Years of education: N/A     Social History Main Topics    Smoking status: Current Every Day Smoker     Types: Cigarettes, E-Cigarettes    Smokeless tobacco: Never Used    Alcohol use Yes      Comment: occassionaly    Drug use: No    Sexual activity: Not Asked     Other Topics Concern    None     Social History Narrative    None       Subjective         Objective    Physical Exam:   Assessment Type: Assess only  General Appearance: Awake, Drowsy  Respiratory Pattern: Labored  Chest Assessment: Chest expansion symmetrical  Bilateral Breath Sounds: Expiratory wheezes  O2 Device: RA    Vitals:  Blood pressure (!) 180/123, pulse (!) 124, temperature 98 8 °F (37 1 °C), temperature source Tympanic, resp  rate 16, SpO2 96 %  Imaging and other studies: I have personally reviewed pertinent reports  O2 Device: RA     Plan    Respiratory Plan: Home Bronchodilator Patient pathway        Resp Comments: (P) Resp protocol initiated at this time  Pt admitted for drug overdose  Pt's speech is slurred and goes in and out of sleep  GF at bedside to provide medical history  Pt has a history of asthma  Pt takes a Xopenex nebulizer at home and albuterol MDI PRN  Pt is a current everyday smoker  Will order pt on Xopenex and Atrovent TID due to wheezing and WOB  Will also order pt Albuterol MDI PRN  Will continue to monitor

## 2018-12-11 ENCOUNTER — HOSPITAL ENCOUNTER (INPATIENT)
Facility: HOSPITAL | Age: 28
LOS: 7 days | Discharge: HOME | DRG: 753 | End: 2018-12-18
Attending: PSYCHIATRY & NEUROLOGY | Admitting: PSYCHIATRY & NEUROLOGY
Payer: COMMERCIAL

## 2018-12-11 VITALS
BODY MASS INDEX: 43.21 KG/M2 | WEIGHT: 308.64 LBS | HEIGHT: 71 IN | RESPIRATION RATE: 20 BRPM | HEART RATE: 110 BPM | TEMPERATURE: 97.5 F | OXYGEN SATURATION: 97 % | SYSTOLIC BLOOD PRESSURE: 123 MMHG | DIASTOLIC BLOOD PRESSURE: 82 MMHG

## 2018-12-11 DIAGNOSIS — F31.32 BIPOLAR AFFECTIVE DISORDER, CURRENTLY DEPRESSED, MODERATE (HCC): ICD-10-CM

## 2018-12-11 DIAGNOSIS — E03.9 ACQUIRED HYPOTHYROIDISM: ICD-10-CM

## 2018-12-11 DIAGNOSIS — E11.9 TYPE 2 DIABETES MELLITUS WITHOUT COMPLICATION, WITHOUT LONG-TERM CURRENT USE OF INSULIN (HCC): ICD-10-CM

## 2018-12-11 DIAGNOSIS — T50.904A DRUG OVERDOSE, UNDETERMINED INTENT, INITIAL ENCOUNTER: ICD-10-CM

## 2018-12-11 DIAGNOSIS — F41.1 GENERALIZED ANXIETY DISORDER: ICD-10-CM

## 2018-12-11 DIAGNOSIS — R00.0 SINUS TACHYCARDIA: Primary | ICD-10-CM

## 2018-12-11 DIAGNOSIS — K21.9 GERD (GASTROESOPHAGEAL REFLUX DISEASE): ICD-10-CM

## 2018-12-11 DIAGNOSIS — G47.00 INSOMNIA: ICD-10-CM

## 2018-12-11 DIAGNOSIS — I10 ESSENTIAL HYPERTENSION: ICD-10-CM

## 2018-12-11 DIAGNOSIS — R07.9 CHEST PAIN: ICD-10-CM

## 2018-12-11 DIAGNOSIS — J45.909 ASTHMA: ICD-10-CM

## 2018-12-11 PROBLEM — T50.901A OVERDOSE: Status: RESOLVED | Noted: 2018-12-09 | Resolved: 2018-12-11

## 2018-12-11 PROBLEM — R74.01 TRANSAMINITIS: Status: RESOLVED | Noted: 2018-12-09 | Resolved: 2018-12-11

## 2018-12-11 PROBLEM — G92.8 TOXIC METABOLIC ENCEPHALOPATHY: Status: RESOLVED | Noted: 2018-12-09 | Resolved: 2018-12-11

## 2018-12-11 LAB
ALBUMIN SERPL BCP-MCNC: 3.6 G/DL (ref 3.5–5)
ALP SERPL-CCNC: 123 U/L (ref 46–116)
ALT SERPL W P-5'-P-CCNC: 143 U/L (ref 12–78)
ANION GAP SERPL CALCULATED.3IONS-SCNC: 7 MMOL/L (ref 4–13)
AST SERPL W P-5'-P-CCNC: 63 U/L (ref 5–45)
BASOPHILS # BLD AUTO: 0.04 THOUSANDS/ΜL (ref 0–0.1)
BASOPHILS NFR BLD AUTO: 1 % (ref 0–1)
BILIRUB SERPL-MCNC: 0.23 MG/DL (ref 0.2–1)
BUN SERPL-MCNC: 13 MG/DL (ref 5–25)
CALCIUM SERPL-MCNC: 9.5 MG/DL (ref 8.3–10.1)
CHLORIDE SERPL-SCNC: 105 MMOL/L (ref 100–108)
CK SERPL-CCNC: 112 U/L (ref 39–308)
CO2 SERPL-SCNC: 27 MMOL/L (ref 21–32)
CREAT SERPL-MCNC: 0.94 MG/DL (ref 0.6–1.3)
EOSINOPHIL # BLD AUTO: 0.47 THOUSAND/ΜL (ref 0–0.61)
EOSINOPHIL NFR BLD AUTO: 6 % (ref 0–6)
ERYTHROCYTE [DISTWIDTH] IN BLOOD BY AUTOMATED COUNT: 14.1 % (ref 11.6–15.1)
GFR SERPL CREATININE-BSD FRML MDRD: 110 ML/MIN/1.73SQ M
GLUCOSE SERPL-MCNC: 130 MG/DL (ref 65–140)
HCT VFR BLD AUTO: 38.7 % (ref 36.5–49.3)
HGB BLD-MCNC: 12.4 G/DL (ref 12–17)
IMM GRANULOCYTES # BLD AUTO: 0.04 THOUSAND/UL (ref 0–0.2)
IMM GRANULOCYTES NFR BLD AUTO: 1 % (ref 0–2)
LYMPHOCYTES # BLD AUTO: 2.47 THOUSANDS/ΜL (ref 0.6–4.47)
LYMPHOCYTES NFR BLD AUTO: 31 % (ref 14–44)
MCH RBC QN AUTO: 31.2 PG (ref 26.8–34.3)
MCHC RBC AUTO-ENTMCNC: 32 G/DL (ref 31.4–37.4)
MCV RBC AUTO: 98 FL (ref 82–98)
MONOCYTES # BLD AUTO: 0.54 THOUSAND/ΜL (ref 0.17–1.22)
MONOCYTES NFR BLD AUTO: 7 % (ref 4–12)
NEUTROPHILS # BLD AUTO: 4.37 THOUSANDS/ΜL (ref 1.85–7.62)
NEUTS SEG NFR BLD AUTO: 54 % (ref 43–75)
NRBC BLD AUTO-RTO: 0 /100 WBCS
PLATELET # BLD AUTO: 252 THOUSANDS/UL (ref 149–390)
PMV BLD AUTO: 11.7 FL (ref 8.9–12.7)
POTASSIUM SERPL-SCNC: 3.6 MMOL/L (ref 3.5–5.3)
PROT SERPL-MCNC: 7.2 G/DL (ref 6.4–8.2)
RBC # BLD AUTO: 3.97 MILLION/UL (ref 3.88–5.62)
SODIUM SERPL-SCNC: 139 MMOL/L (ref 136–145)
T4 FREE SERPL-MCNC: 0.71 NG/DL (ref 0.76–1.46)
TSH SERPL DL<=0.05 MIU/L-ACNC: 5.45 UIU/ML (ref 0.36–3.74)
WBC # BLD AUTO: 7.93 THOUSAND/UL (ref 4.31–10.16)

## 2018-12-11 PROCEDURE — 82550 ASSAY OF CK (CPK): CPT | Performed by: INTERNAL MEDICINE

## 2018-12-11 PROCEDURE — 99239 HOSP IP/OBS DSCHRG MGMT >30: CPT | Performed by: INTERNAL MEDICINE

## 2018-12-11 PROCEDURE — 84439 ASSAY OF FREE THYROXINE: CPT | Performed by: INTERNAL MEDICINE

## 2018-12-11 PROCEDURE — 94640 AIRWAY INHALATION TREATMENT: CPT

## 2018-12-11 PROCEDURE — 85025 COMPLETE CBC W/AUTO DIFF WBC: CPT | Performed by: INTERNAL MEDICINE

## 2018-12-11 PROCEDURE — 80053 COMPREHEN METABOLIC PANEL: CPT | Performed by: INTERNAL MEDICINE

## 2018-12-11 PROCEDURE — 90686 IIV4 VACC NO PRSV 0.5 ML IM: CPT | Performed by: GENERAL PRACTICE

## 2018-12-11 PROCEDURE — 94760 N-INVAS EAR/PLS OXIMETRY 1: CPT

## 2018-12-11 PROCEDURE — 84443 ASSAY THYROID STIM HORMONE: CPT | Performed by: INTERNAL MEDICINE

## 2018-12-11 RX ORDER — LORAZEPAM 2 MG/ML
2 INJECTION INTRAMUSCULAR EVERY 6 HOURS PRN
Status: CANCELLED | OUTPATIENT
Start: 2018-12-11

## 2018-12-11 RX ORDER — MONTELUKAST SODIUM 10 MG/1
10 TABLET ORAL
Status: DISCONTINUED | OUTPATIENT
Start: 2018-12-11 | End: 2018-12-18 | Stop reason: HOSPADM

## 2018-12-11 RX ORDER — NICOTINE 21 MG/24HR
1 PATCH, TRANSDERMAL 24 HOURS TRANSDERMAL DAILY
Status: CANCELLED | OUTPATIENT
Start: 2018-12-12

## 2018-12-11 RX ORDER — LIDOCAINE 50 MG/G
1 PATCH TOPICAL DAILY
Status: DISCONTINUED | OUTPATIENT
Start: 2018-12-12 | End: 2018-12-18 | Stop reason: HOSPADM

## 2018-12-11 RX ORDER — PANTOPRAZOLE SODIUM 20 MG/1
20 TABLET, DELAYED RELEASE ORAL
Status: CANCELLED | OUTPATIENT
Start: 2018-12-12

## 2018-12-11 RX ORDER — HALOPERIDOL 5 MG/ML
5 INJECTION INTRAMUSCULAR EVERY 6 HOURS PRN
Status: CANCELLED | OUTPATIENT
Start: 2018-12-11

## 2018-12-11 RX ORDER — SERTRALINE HYDROCHLORIDE 100 MG/1
100 TABLET, FILM COATED ORAL DAILY
Status: DISCONTINUED | OUTPATIENT
Start: 2018-12-12 | End: 2018-12-18 | Stop reason: HOSPADM

## 2018-12-11 RX ORDER — PANTOPRAZOLE SODIUM 20 MG/1
20 TABLET, DELAYED RELEASE ORAL
Status: DISCONTINUED | OUTPATIENT
Start: 2018-12-12 | End: 2018-12-18 | Stop reason: HOSPADM

## 2018-12-11 RX ORDER — MONTELUKAST SODIUM 10 MG/1
10 TABLET ORAL
Status: CANCELLED | OUTPATIENT
Start: 2018-12-11

## 2018-12-11 RX ORDER — HYDROCHLOROTHIAZIDE 25 MG/1
25 TABLET ORAL 2 TIMES DAILY
Status: DISCONTINUED | OUTPATIENT
Start: 2018-12-12 | End: 2018-12-18 | Stop reason: HOSPADM

## 2018-12-11 RX ORDER — LORAZEPAM 1 MG/1
1 TABLET ORAL EVERY 6 HOURS PRN
Status: DISCONTINUED | OUTPATIENT
Start: 2018-12-11 | End: 2018-12-18 | Stop reason: HOSPADM

## 2018-12-11 RX ORDER — ALBUTEROL SULFATE 90 UG/1
2 AEROSOL, METERED RESPIRATORY (INHALATION) EVERY 4 HOURS PRN
Status: DISCONTINUED | OUTPATIENT
Start: 2018-12-11 | End: 2018-12-18 | Stop reason: HOSPADM

## 2018-12-11 RX ORDER — LISINOPRIL 20 MG/1
20 TABLET ORAL 2 TIMES DAILY
Status: CANCELLED | OUTPATIENT
Start: 2018-12-11

## 2018-12-11 RX ORDER — MAGNESIUM HYDROXIDE/ALUMINUM HYDROXICE/SIMETHICONE 120; 1200; 1200 MG/30ML; MG/30ML; MG/30ML
30 SUSPENSION ORAL EVERY 4 HOURS PRN
Status: DISCONTINUED | OUTPATIENT
Start: 2018-12-11 | End: 2018-12-18 | Stop reason: HOSPADM

## 2018-12-11 RX ORDER — RISPERIDONE 1 MG/1
1 TABLET, ORALLY DISINTEGRATING ORAL
Status: DISCONTINUED | OUTPATIENT
Start: 2018-12-11 | End: 2018-12-18 | Stop reason: HOSPADM

## 2018-12-11 RX ORDER — OLANZAPINE 10 MG/1
10 INJECTION, POWDER, LYOPHILIZED, FOR SOLUTION INTRAMUSCULAR EVERY 8 HOURS PRN
Status: DISCONTINUED | OUTPATIENT
Start: 2018-12-11 | End: 2018-12-18 | Stop reason: HOSPADM

## 2018-12-11 RX ORDER — GABAPENTIN 100 MG/1
100 CAPSULE ORAL ONCE
Status: COMPLETED | OUTPATIENT
Start: 2018-12-11 | End: 2018-12-11

## 2018-12-11 RX ORDER — LEVALBUTEROL 1.25 MG/.5ML
1.25 SOLUTION, CONCENTRATE RESPIRATORY (INHALATION)
Status: CANCELLED | OUTPATIENT
Start: 2018-12-11

## 2018-12-11 RX ORDER — ALPRAZOLAM 0.5 MG/1
1 TABLET ORAL 3 TIMES DAILY PRN
Status: CANCELLED | OUTPATIENT
Start: 2018-12-11

## 2018-12-11 RX ORDER — LIDOCAINE 50 MG/G
1 PATCH TOPICAL DAILY
Status: CANCELLED | OUTPATIENT
Start: 2018-12-12

## 2018-12-11 RX ORDER — BENZTROPINE MESYLATE 1 MG/ML
1 INJECTION INTRAMUSCULAR; INTRAVENOUS EVERY 6 HOURS PRN
Status: CANCELLED | OUTPATIENT
Start: 2018-12-11

## 2018-12-11 RX ORDER — LEVALBUTEROL 1.25 MG/.5ML
1.25 SOLUTION, CONCENTRATE RESPIRATORY (INHALATION)
Status: DISCONTINUED | OUTPATIENT
Start: 2018-12-12 | End: 2018-12-13

## 2018-12-11 RX ORDER — LORAZEPAM 1 MG/1
1 TABLET ORAL EVERY 6 HOURS PRN
Status: CANCELLED | OUTPATIENT
Start: 2018-12-11

## 2018-12-11 RX ORDER — LANOLIN ALCOHOL/MO/W.PET/CERES
3 CREAM (GRAM) TOPICAL ONCE
Status: COMPLETED | OUTPATIENT
Start: 2018-12-11 | End: 2018-12-11

## 2018-12-11 RX ORDER — HALOPERIDOL 5 MG
5 TABLET ORAL EVERY 6 HOURS PRN
Status: DISCONTINUED | OUTPATIENT
Start: 2018-12-11 | End: 2018-12-18 | Stop reason: HOSPADM

## 2018-12-11 RX ORDER — LORAZEPAM 2 MG/ML
2 INJECTION INTRAMUSCULAR EVERY 6 HOURS PRN
Status: DISCONTINUED | OUTPATIENT
Start: 2018-12-11 | End: 2018-12-18 | Stop reason: HOSPADM

## 2018-12-11 RX ORDER — HALOPERIDOL 5 MG/ML
5 INJECTION INTRAMUSCULAR EVERY 6 HOURS PRN
Status: DISCONTINUED | OUTPATIENT
Start: 2018-12-11 | End: 2018-12-18 | Stop reason: HOSPADM

## 2018-12-11 RX ORDER — OLANZAPINE 10 MG/1
10 TABLET ORAL EVERY 8 HOURS PRN
Status: CANCELLED | OUTPATIENT
Start: 2018-12-11

## 2018-12-11 RX ORDER — FLUTICASONE FUROATE AND VILANTEROL 100; 25 UG/1; UG/1
1 POWDER RESPIRATORY (INHALATION)
Status: DISCONTINUED | OUTPATIENT
Start: 2018-12-12 | End: 2018-12-18 | Stop reason: HOSPADM

## 2018-12-11 RX ORDER — BENZTROPINE MESYLATE 1 MG/ML
1 INJECTION INTRAMUSCULAR; INTRAVENOUS EVERY 6 HOURS PRN
Status: DISCONTINUED | OUTPATIENT
Start: 2018-12-11 | End: 2018-12-18 | Stop reason: HOSPADM

## 2018-12-11 RX ORDER — NICOTINE 21 MG/24HR
1 PATCH, TRANSDERMAL 24 HOURS TRANSDERMAL DAILY
Status: DISCONTINUED | OUTPATIENT
Start: 2018-12-12 | End: 2018-12-18 | Stop reason: HOSPADM

## 2018-12-11 RX ORDER — ACETAMINOPHEN 325 MG/1
650 TABLET ORAL EVERY 6 HOURS PRN
Status: CANCELLED | OUTPATIENT
Start: 2018-12-11

## 2018-12-11 RX ORDER — RISPERIDONE 1 MG/1
1 TABLET, ORALLY DISINTEGRATING ORAL
Status: CANCELLED | OUTPATIENT
Start: 2018-12-11

## 2018-12-11 RX ORDER — OLANZAPINE 10 MG/1
10 TABLET ORAL EVERY 8 HOURS PRN
Status: DISCONTINUED | OUTPATIENT
Start: 2018-12-11 | End: 2018-12-18 | Stop reason: HOSPADM

## 2018-12-11 RX ORDER — BENZTROPINE MESYLATE 1 MG/1
1 TABLET ORAL EVERY 6 HOURS PRN
Status: DISCONTINUED | OUTPATIENT
Start: 2018-12-11 | End: 2018-12-18 | Stop reason: HOSPADM

## 2018-12-11 RX ORDER — ALBUTEROL SULFATE 90 UG/1
2 AEROSOL, METERED RESPIRATORY (INHALATION) EVERY 4 HOURS PRN
Status: CANCELLED | OUTPATIENT
Start: 2018-12-11

## 2018-12-11 RX ORDER — MAGNESIUM HYDROXIDE/ALUMINUM HYDROXICE/SIMETHICONE 120; 1200; 1200 MG/30ML; MG/30ML; MG/30ML
30 SUSPENSION ORAL EVERY 4 HOURS PRN
Status: CANCELLED | OUTPATIENT
Start: 2018-12-11

## 2018-12-11 RX ORDER — HALOPERIDOL 5 MG
5 TABLET ORAL EVERY 6 HOURS PRN
Status: CANCELLED | OUTPATIENT
Start: 2018-12-11

## 2018-12-11 RX ORDER — OLANZAPINE 10 MG/1
10 INJECTION, POWDER, LYOPHILIZED, FOR SOLUTION INTRAMUSCULAR EVERY 8 HOURS PRN
Status: CANCELLED | OUTPATIENT
Start: 2018-12-11

## 2018-12-11 RX ORDER — LANOLIN ALCOHOL/MO/W.PET/CERES
3 CREAM (GRAM) TOPICAL
Status: DISCONTINUED | OUTPATIENT
Start: 2018-12-11 | End: 2018-12-11

## 2018-12-11 RX ORDER — HYDROCHLOROTHIAZIDE 25 MG/1
25 TABLET ORAL 2 TIMES DAILY
Status: CANCELLED | OUTPATIENT
Start: 2018-12-11

## 2018-12-11 RX ORDER — HYDROXYZINE 50 MG/1
50 TABLET, FILM COATED ORAL EVERY 6 HOURS PRN
Status: DISCONTINUED | OUTPATIENT
Start: 2018-12-11 | End: 2018-12-18 | Stop reason: HOSPADM

## 2018-12-11 RX ORDER — ZOLPIDEM TARTRATE 5 MG/1
5 TABLET ORAL
Status: CANCELLED | OUTPATIENT
Start: 2018-12-11

## 2018-12-11 RX ORDER — ZOLPIDEM TARTRATE 5 MG/1
5 TABLET ORAL
Status: DISCONTINUED | OUTPATIENT
Start: 2018-12-11 | End: 2018-12-12

## 2018-12-11 RX ORDER — HYDROXYZINE HYDROCHLORIDE 25 MG/1
50 TABLET, FILM COATED ORAL EVERY 6 HOURS PRN
Status: CANCELLED | OUTPATIENT
Start: 2018-12-11

## 2018-12-11 RX ORDER — BENZTROPINE MESYLATE 1 MG/1
1 TABLET ORAL EVERY 6 HOURS PRN
Status: CANCELLED | OUTPATIENT
Start: 2018-12-11

## 2018-12-11 RX ORDER — ACETAMINOPHEN 325 MG/1
650 TABLET ORAL EVERY 6 HOURS PRN
Status: DISCONTINUED | OUTPATIENT
Start: 2018-12-11 | End: 2018-12-18 | Stop reason: HOSPADM

## 2018-12-11 RX ORDER — LISINOPRIL 20 MG/1
20 TABLET ORAL 2 TIMES DAILY
Status: DISCONTINUED | OUTPATIENT
Start: 2018-12-12 | End: 2018-12-18 | Stop reason: HOSPADM

## 2018-12-11 RX ORDER — FLUTICASONE FUROATE AND VILANTEROL 100; 25 UG/1; UG/1
1 POWDER RESPIRATORY (INHALATION)
Status: CANCELLED | OUTPATIENT
Start: 2018-12-12

## 2018-12-11 RX ADMIN — TRAMADOL HYDROCHLORIDE 50 MG: 50 TABLET, COATED ORAL at 09:43

## 2018-12-11 RX ADMIN — ZOLPIDEM TARTRATE 5 MG: 5 TABLET, COATED ORAL at 21:51

## 2018-12-11 RX ADMIN — IPRATROPIUM BROMIDE 0.5 MG: 0.5 SOLUTION RESPIRATORY (INHALATION) at 07:26

## 2018-12-11 RX ADMIN — TRAMADOL HYDROCHLORIDE 50 MG: 50 TABLET, COATED ORAL at 16:05

## 2018-12-11 RX ADMIN — HYDROCHLOROTHIAZIDE 25 MG: 25 TABLET ORAL at 17:55

## 2018-12-11 RX ADMIN — INFLUENZA VIRUS VACCINE 0.5 ML: 15; 15; 15; 15 SUSPENSION INTRAMUSCULAR at 19:40

## 2018-12-11 RX ADMIN — LEVALBUTEROL 1.25 MG: 1.25 SOLUTION, CONCENTRATE RESPIRATORY (INHALATION) at 07:26

## 2018-12-11 RX ADMIN — FLUTICASONE FUROATE AND VILANTEROL TRIFENATATE 1 PUFF: 100; 25 POWDER RESPIRATORY (INHALATION) at 09:37

## 2018-12-11 RX ADMIN — HYDROCHLOROTHIAZIDE 25 MG: 25 TABLET ORAL at 09:35

## 2018-12-11 RX ADMIN — LIDOCAINE 1 PATCH: 50 PATCH CUTANEOUS at 09:35

## 2018-12-11 RX ADMIN — MELATONIN 3 MG: at 03:41

## 2018-12-11 RX ADMIN — MONTELUKAST SODIUM 10 MG: 10 TABLET, FILM COATED ORAL at 21:52

## 2018-12-11 RX ADMIN — LISINOPRIL 20 MG: 20 TABLET ORAL at 17:55

## 2018-12-11 RX ADMIN — GABAPENTIN 100 MG: 100 CAPSULE ORAL at 01:11

## 2018-12-11 RX ADMIN — MELATONIN 3 MG: at 01:11

## 2018-12-11 RX ADMIN — ALPRAZOLAM 1 MG: 0.5 TABLET ORAL at 13:13

## 2018-12-11 RX ADMIN — TRAMADOL HYDROCHLORIDE 50 MG: 50 TABLET, COATED ORAL at 02:43

## 2018-12-11 RX ADMIN — SERTRALINE HYDROCHLORIDE 100 MG: 50 TABLET ORAL at 09:35

## 2018-12-11 RX ADMIN — LISINOPRIL 20 MG: 20 TABLET ORAL at 09:35

## 2018-12-11 RX ADMIN — IPRATROPIUM BROMIDE 0.5 MG: 0.5 SOLUTION RESPIRATORY (INHALATION) at 13:35

## 2018-12-11 RX ADMIN — ALPRAZOLAM 1 MG: 0.5 TABLET ORAL at 19:19

## 2018-12-11 RX ADMIN — LEVALBUTEROL 1.25 MG: 1.25 SOLUTION, CONCENTRATE RESPIRATORY (INHALATION) at 13:35

## 2018-12-11 RX ADMIN — PANTOPRAZOLE SODIUM 20 MG: 20 TABLET, DELAYED RELEASE ORAL at 06:25

## 2018-12-11 NOTE — SOCIAL WORK
[facility], spoke with Nichol Major, [facility] and can utilize any ambulance provider to provide service  They do not contract with ambulance providers

## 2018-12-11 NOTE — QUICK NOTE
Went to speak with patient, complaining of not being able to sleep and multiple different pains  He explains that his chest wall is tender from the sternal rubs and compressions and his right knee is sore from a previous fall  He is also having difficulty falling asleep  Agreeable to an additional 3mg Melatonin at this time  Ice and heat as needed for his chest wall and knee

## 2018-12-11 NOTE — DISCHARGE SUMMARY
Discharge Summary - [facility]    Patient Information: Graham Skelton 29 y o  male MRN: [id number]  [facility] Encounter: [id number]    Discharging Physician / Practitioner: Melecio Dawkins MD  PCP: No primary care provider on file  Admission Date: 12/9/2018  Discharge Date: 12/11/18    Disposition:     Other: Inpatient psychiatry    Reason for Admission:  Unresponsive, poly substance overdose    Discharge Diagnoses:     Principal Problem (Resolved): Overdose  Active Problems:    Anxiety    Hypertension    Asthma    Depression    Sinus tachycardia  Resolved Problems:    Toxic metabolic encephalopathy    Transaminitis      Consultations During Hospital Stay:  · Toxicology  · Psychiatry    Procedures Performed:     · CT head no acute interval abnormality      Hospital Course:     Graham Skelton is a 29 y o  male patient who originally presented to the hospital on 12/9/2018 due to unresponsive, polysubstance overdose  Patient had presented with being unresponsive, he was noted to have ecstasy, cough syrup  Urine drug screen was noted to be positive for amphetamine, benzodiazepine, THC, cocaine, opiate, phencyclidine  Patient was seen in consultation with Psychiatry and toxicology  He received supportive care, including acetylcysteine  Patient was seen in consultation with Psychiatry, he has a recommended inpatient psychiatry admission  Today he remains hemodynamically stable symptomatically improved and is medically stable to be transferred to inpatient psychiatry floor  Kindly review the chart for details      Condition at Discharge: fair     Discharge Day Visit / Exam:     Subjective:      Comfortably sitting up in bed  Reports feeling okay  Agreeable to discharge plan    Vitals: Blood Pressure: 135/87 (12/11/18 0650)  Pulse: 100 (12/11/18 0650)  Temperature: 97 9 °F (36 6 °C) (12/11/18 0650)  Temp Source: Oral (12/10/18 2221)  Respirations: 18 (12/11/18 1481)  Height: 5' 11" (180 3 cm) (12/10/18 2221)  Weight - Scale: (!) 140 kg (308 lb 10 3 oz) (12/10/18 2221)  SpO2: 98 % (12/11/18 1335)  Exam:   Physical Exam    Obese  Short thick neck  Mucous members are moist  Lungs diminished breath sounds bilaterally  Heart sounds S1-S2 noted  Abdomen soft  Awake obeys simple commands  No rash  Pulses noted  No pedal edema    Discharge instructions/Information to patient and family:   See after visit summary for information provided to patient and family  Discharge plan discussed the patient, adult female at bedside updated  Patient will be transferred to inpatient psychiatry    Provisions for Follow-Up Care:  See after visit summary for information related to follow-up care and any pertinent home health orders  Planned Readmission: no     Discharge Statement:  I spent 50 minutes discharging the patient  This time was spent on the day of discharge  I had direct contact with the patient on the day of discharge  Greater than 50% of the total time was spent examining patient, answering all patient questions, arranging and discussing plan of care with patient as well as directly providing post-discharge instructions  Additional time then spent on discharge activities  Discharge Medications:  See after visit summary for reconciled discharge medications provided to patient and family        ** Please Note: This note has been constructed using a voice recognition system **

## 2018-12-11 NOTE — UTILIZATION REVIEW
OBSERVATION WRITTEN 12/09/18 @ 1556 CONVERTED TO INPATIENT ADMISSION 12/10/18 @ 1820 DUE TO FURTHER DIAGNOSTIC WORKUP REQUIRED FOR OVERDOSE WITH HEMODYNAMIC INSTABILITY, REQUIRING AT LEAST A 2 MIDNIGHT STAY  Admitting Physician Nicholes Crigler, [address] [facility]    Estimated length of stay More than 2 Midnights    Certification I certify that inpatient services are medically necessary for this patient for a duration of greater than two midnights  See H&P and MD Progress Notes for additional information about the patient's course of treatment        Date/Time  Temp  Pulse  BP   12/10/18 2221  98 2 °F (36 8 °C)   121  148/93   12/10/18 1138  --   114  --   12/10/18 1137   97 3 °F (36 3 °C)   116  166/72   12/10/18 1136  --   112  --   12/10/18 1013  --   121  --   12/10/18 1012  --   121  --   12/10/18 1011  --   117  156/99   12/10/18 0757  --   119  --   12/10/18 0756  --   117  --   12/10/18 0755  97 9 °F (36 6 °C)   119  162/70   12/10/18 0525  --   117   154/102   12/10/18 0400  --   116   161/106   12/10/18 0200  --  --   168/108   12/10/18 0000  --  --   180/90   12/09/18 2323  --  --   173/114   12/09/18 2135  --   119   178/120   12/09/18 2117  --  --   180/123   12/09/18 2116  --  --   200/130   12/09/18 2045  --  --   220/150   12/09/18 1900  --   124  132/80   12/09/18 1849  --   119  120/67   12/09/18 1830  --   122  120/67   12/09/18 1730  --   118  137/67   12/09/18 1700  --   116  119/73   12/09/18 1630  --   118  123/66   12/09/18 1600  --   120   199/138   12/09/18 1530  --   114   193/102   12/09/18 1529  --   113   197/87   12/09/18 1500  --   112   197/87   12/09/18 1439  --   115   187/88   12/09/18 1430  --   116   187/88   12/09/18 1400  --   118   192/88   12/09/18 1348  --   120  144/75   12/09/18 1330  --   122  144/75   12/09/18 1300  --   124   138/104   12/09/18 1245  --   122  130/88   12/09/18 1230  --   126  130/88   12/09/18 1200  --   116  148/85   12/09/18 1145  --   120 142/77   12/09/18 1136  --   126   158/103     ASSESSMENT AND PLAN:  Sinus tachycardia   Assessment & Plan     Likely related to overdose as above  Continue to monitor for now  Check TSH       Depression   Assessment & Plan     Continue with home dose zoloft and prn xanax       Asthma   Assessment & Plan     Continue with p r n  Albuterol and Atrovent  Restart home dose p o         Transaminitis   Assessment & Plan     Trending down  Status post an acetylcysteine protocol per toxicology for suspected Tylenol overdose  Acute Viral hepatitis panel negative  Trend CMP       Hypertension   Assessment & Plan     Noncompliant on medications at home  Continue with home dose lisinopril, hydrochlorothiazide  Hydralazine p r n         Anxiety   Assessment & Plan     Pt taking Xanax 1 mg tid - confirmed on PDMP  Continue with home dose benzo p r n         Toxic metabolic encephalopathy   Assessment & Plan     2/2 overdose  Supportive care      * Overdose   Assessment & Plan     Poly substance overdose  Patient denies suicidal ideation but it was unintentional overdose  Pt took ecstasy, 120 mL cough syrup containing pseudoephedrine, DM, and bromopheniramine, and Xanax  Also possibly took cocaine  Appreciate toxicology input  Urinary retention protocol - took H1 antagonist  1 to 1  Psych consult,>> patient is 201 per psych, continue with one-to-one and suicide precautions                VTE Pharmacologic Prophylaxis:   Pharmacologic: Enoxaparin (Lovenox)  Mechanical VTE Prophylaxis in Place: Yes  Certification Statement: The patient will continue to require additional inpatient hospital stay due to Not medically stable     Discharge Plan:  When medically stable      [address] Utilization Review Department  Phone: [phone]; Fax [phone]  [email or web address]  ATTENTION: Please call with any questions or concerns to [phone]  and carefully listen to the prompts so that you are directed to the right person  Send all requests for admission clinical reviews, approved or denied determinations and any other requests to fax [phone]   All voicemails are confidential

## 2018-12-11 NOTE — PLAN OF CARE
Problem: DISCHARGE PLANNING - CARE MANAGEMENT  Goal: Discharge to post-acute care or home with appropriate resources  INTERVENTIONS:  - Conduct assessment to determine patient/family and health care team treatment goals, and need for post-acute services based on payer coverage, community resources, and patient preferences, and barriers to discharge  - Address psychosocial, clinical, and financial barriers to discharge as identified in assessment in conjunction with the patient/family and health care team  - Arrange appropriate level of post-acute services according to patient's   needs and preference and payer coverage in collaboration with the physician and health care team  - Communicate with and update the patient/family, physician, and health care team regarding progress on the discharge plan  - Arrange appropriate transportation to post-acute venues  -D/c to inpt psych  Outcome: Adequate for Discharge

## 2018-12-12 PROBLEM — F12.10 CANNABIS ABUSE: Status: ACTIVE | Noted: 2018-12-12

## 2018-12-12 PROBLEM — F41.1 GENERALIZED ANXIETY DISORDER: Status: ACTIVE | Noted: 2018-12-12

## 2018-12-12 PROBLEM — F31.32 BIPOLAR AFFECTIVE DISORDER, CURRENTLY DEPRESSED, MODERATE (HCC): Status: ACTIVE | Noted: 2018-12-12

## 2018-12-12 LAB
ALBUMIN SERPL BCP-MCNC: 3.6 G/DL (ref 3.5–5)
ALP SERPL-CCNC: 110 U/L (ref 46–116)
ALT SERPL W P-5'-P-CCNC: 143 U/L (ref 12–78)
ANION GAP SERPL CALCULATED.3IONS-SCNC: 9 MMOL/L (ref 4–13)
AST SERPL W P-5'-P-CCNC: 54 U/L (ref 5–45)
BILIRUB SERPL-MCNC: 0.3 MG/DL (ref 0.2–1)
BUN SERPL-MCNC: 10 MG/DL (ref 5–25)
CALCIUM SERPL-MCNC: 8.8 MG/DL (ref 8.3–10.1)
CHLORIDE SERPL-SCNC: 100 MMOL/L (ref 100–108)
CHOLEST SERPL-MCNC: 152 MG/DL (ref 50–200)
CO2 SERPL-SCNC: 31 MMOL/L (ref 21–32)
CREAT SERPL-MCNC: 0.81 MG/DL (ref 0.6–1.3)
EST. AVERAGE GLUCOSE BLD GHB EST-MCNC: 186 MG/DL
GFR SERPL CREATININE-BSD FRML MDRD: 121 ML/MIN/1.73SQ M
GLUCOSE P FAST SERPL-MCNC: 101 MG/DL (ref 65–99)
GLUCOSE SERPL-MCNC: 101 MG/DL (ref 65–140)
HBA1C MFR BLD: 8.1 % (ref 4.2–6.3)
HDLC SERPL-MCNC: 43 MG/DL (ref 40–60)
LDLC SERPL CALC-MCNC: 83 MG/DL (ref 0–100)
NONHDLC SERPL-MCNC: 109 MG/DL
POTASSIUM SERPL-SCNC: 3.6 MMOL/L (ref 3.5–5.3)
PROT SERPL-MCNC: 7.1 G/DL (ref 6.4–8.2)
RPR SER QL: NORMAL
SODIUM SERPL-SCNC: 140 MMOL/L (ref 136–145)
TRIGL SERPL-MCNC: 131 MG/DL

## 2018-12-12 PROCEDURE — 99222 1ST HOSP IP/OBS MODERATE 55: CPT | Performed by: PSYCHIATRY & NEUROLOGY

## 2018-12-12 PROCEDURE — 94640 AIRWAY INHALATION TREATMENT: CPT

## 2018-12-12 PROCEDURE — 80053 COMPREHEN METABOLIC PANEL: CPT | Performed by: PHYSICIAN ASSISTANT

## 2018-12-12 PROCEDURE — 80061 LIPID PANEL: CPT | Performed by: PHYSICIAN ASSISTANT

## 2018-12-12 PROCEDURE — 83036 HEMOGLOBIN GLYCOSYLATED A1C: CPT | Performed by: PHYSICIAN ASSISTANT

## 2018-12-12 PROCEDURE — 94760 N-INVAS EAR/PLS OXIMETRY 1: CPT

## 2018-12-12 PROCEDURE — 99252 IP/OBS CONSLTJ NEW/EST SF 35: CPT | Performed by: PHYSICIAN ASSISTANT

## 2018-12-12 PROCEDURE — 86592 SYPHILIS TEST NON-TREP QUAL: CPT | Performed by: PHYSICIAN ASSISTANT

## 2018-12-12 RX ORDER — TRAZODONE HYDROCHLORIDE 50 MG/1
50 TABLET ORAL
Status: DISCONTINUED | OUTPATIENT
Start: 2018-12-12 | End: 2018-12-13

## 2018-12-12 RX ORDER — ZOLPIDEM TARTRATE 5 MG/1
10 TABLET ORAL
Status: DISCONTINUED | OUTPATIENT
Start: 2018-12-12 | End: 2018-12-12

## 2018-12-12 RX ORDER — ARIPIPRAZOLE 5 MG/1
5 TABLET ORAL DAILY
Status: DISCONTINUED | OUTPATIENT
Start: 2018-12-12 | End: 2018-12-18 | Stop reason: HOSPADM

## 2018-12-12 RX ORDER — LORAZEPAM 1 MG/1
1 TABLET ORAL 2 TIMES DAILY
Status: DISCONTINUED | OUTPATIENT
Start: 2018-12-12 | End: 2018-12-18 | Stop reason: HOSPADM

## 2018-12-12 RX ADMIN — LISINOPRIL 20 MG: 20 TABLET ORAL at 17:01

## 2018-12-12 RX ADMIN — IPRATROPIUM BROMIDE 0.5 MG: 0.5 SOLUTION RESPIRATORY (INHALATION) at 20:55

## 2018-12-12 RX ADMIN — LEVALBUTEROL 1.25 MG: 1.25 SOLUTION, CONCENTRATE RESPIRATORY (INHALATION) at 20:53

## 2018-12-12 RX ADMIN — ACETAMINOPHEN 650 MG: 325 TABLET, FILM COATED ORAL at 12:20

## 2018-12-12 RX ADMIN — ARIPIPRAZOLE 5 MG: 5 TABLET ORAL at 12:03

## 2018-12-12 RX ADMIN — PANTOPRAZOLE SODIUM 20 MG: 20 TABLET, DELAYED RELEASE ORAL at 06:21

## 2018-12-12 RX ADMIN — LORAZEPAM 1 MG: 1 TABLET ORAL at 17:02

## 2018-12-12 RX ADMIN — IPRATROPIUM BROMIDE 0.5 MG: 0.5 SOLUTION RESPIRATORY (INHALATION) at 10:59

## 2018-12-12 RX ADMIN — LORAZEPAM 1 MG: 1 TABLET ORAL at 09:33

## 2018-12-12 RX ADMIN — LISINOPRIL 20 MG: 20 TABLET ORAL at 09:31

## 2018-12-12 RX ADMIN — HYDROCHLOROTHIAZIDE 25 MG: 25 TABLET ORAL at 09:31

## 2018-12-12 RX ADMIN — MONTELUKAST SODIUM 10 MG: 10 TABLET, FILM COATED ORAL at 21:14

## 2018-12-12 RX ADMIN — SERTRALINE HYDROCHLORIDE 100 MG: 100 TABLET ORAL at 09:31

## 2018-12-12 RX ADMIN — HYDROCHLOROTHIAZIDE 25 MG: 25 TABLET ORAL at 17:02

## 2018-12-12 RX ADMIN — LIDOCAINE 1 PATCH: 50 PATCH TOPICAL at 10:42

## 2018-12-12 RX ADMIN — TRAZODONE HYDROCHLORIDE 50 MG: 50 TABLET ORAL at 21:14

## 2018-12-12 RX ADMIN — FLUTICASONE FUROATE AND VILANTEROL TRIFENATATE 1 PUFF: 100; 25 POWDER RESPIRATORY (INHALATION) at 09:40

## 2018-12-12 RX ADMIN — LEVALBUTEROL 1.25 MG: 1.25 SOLUTION, CONCENTRATE RESPIRATORY (INHALATION) at 10:58

## 2018-12-12 NOTE — TREATMENT PLAN
RN will meet with you at least twice per day to assess for any concerns, teach about prescribed medications and diagnosis  Patient will be taught and encouraged to utilize healthy coping skills

## 2018-12-12 NOTE — PROGRESS NOTES
Pt is a 201 from [facility] medical unit s/p intentional polysubstance OD  Pt reports he tried to kill himself by ingesting a bottle of cough syrup DM, ecstasy, xanax  Pt UDS positive for meth, benzos, THC, cocaine, opiates, PCP, required stabilization prior to [facility] admission  Denies alcohol or routine substance use  Pt was unresponsive, required chest compressions, narcan at ED  Reports increased stressors such as helping family/girlfriend with their issues and also working and attending school  Pt reports having ongoing depression, is med compliant but states meds are ineffective  Reports high level of anxiety with panic attacks  Denies previous SA  One previous [facility] hospitalization  Hx of asthma and hypertension  Past right knee surgery, reports leg pain/weakness at times  Reports chest discomfort due to compressions  Denies any HI, AVH  Denies current SI, able to contract for safety

## 2018-12-12 NOTE — PROGRESS NOTES
Patient cooperative but scant during our interaction  He currently denies any SI/HI  He reports anxiety/depression 8/10  Patient does contract for safety  Will continue to monitor
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
## 2018-12-12 NOTE — PLAN OF CARE
CARDIOVASCULAR - ADULT     Maintains optimal cardiac output and hemodynamic stability Adequate for Discharge     Absence of cardiac dysrhythmias or at baseline rhythm Adequate for Discharge        HEMATOLOGIC - ADULT     Maintains hematologic stability Adequate for Discharge        METABOLIC, FLUID AND ELECTROLYTES - ADULT     Electrolytes maintained within normal limits Adequate for Discharge     Fluid balance maintained Adequate for Discharge     Glucose maintained within target range Adequate for Discharge        NEUROSENSORY - ADULT     Achieves stable or improved neurological status Adequate for Discharge     Absence of seizures Adequate for Discharge     Remains free of injury related to seizures activity Adequate for Discharge     Achieves maximal functionality and self care Adequate for Discharge        RESPIRATORY - ADULT     Achieves optimal ventilation and oxygenation Adequate for Discharge        SUBSTANCE USE/ABUSE     Will have no detox symptoms and will verbalize plan for changing substance-related behavior Adequate for Discharge     By discharge, will develop insight into their chemical dependency and sustain motivation to continue in recovery Adequate for Discharge     By discharge, patient will have ongoing treatment plan addressing chemical dependency Adequate for Discharge

## 2018-12-12 NOTE — TREATMENT PLAN
TREATMENT PLAN REVIEW - [id number] [address] 28 y o  1990 male MRN: [id number]    [address] [place] [address] Room / Bed: RUST 258/RUST 634-66 Encounter: [id number]          Admit Date/Time:  12/11/2018  8:55 PM    Treatment Team: Attending Provider: Dheeraj Rojas; Consulting Physician: Mallory Abernathy PA-C; Patient Care Assistant: Vivek Alfaro; Patient Care Assistant: Isaías Zelaya; Patient Care Assistant: Jag Chacko; Medications RN: Joey Leong RN; Registered Nurse: Torey Estrella RN; Occupational Therapy Assistant: JOHN Hi;  Registered Nurse: Karishma Aldridge RN; : Leo Gonzales    Diagnosis: Principal Problem:    Bipolar affective disorder, currently depressed, moderate ([facility] [address] )  Active Problems:    Generalized anxiety disorder    Cannabis abuse    Patient Strengths/Assets: cooperative, compliant with medication, good past treatment response, motivation for treatment/growth, patient is on a voluntary commitment    Patient Barriers/Limitations: low self esteem    Short Term Goals: decrease in depressive symptoms, decrease in anxiety symptoms, decrease in suicidal thoughts    Long Term Goals: improvement in depression, improvement in anxiety, stabilization of mood, free of suicidal thoughts, acceptance of need for psychiatric medications, acceptance of need for psychiatric treatment, acceptance of need for psychiatric follow up after discharge    Progress Towards Goals: starting psychiatric medications as prescribed    Recommended Treatment: medication management, patient medication education, group therapy, milieu therapy, continued Behavioral Health psychiatric evaluation/assessment process    Treatment Frequency: daily medication monitoring, group and milieu therapy daily, monitoring through interdisciplinary rounds, monitoring through weekly patient care conferences    Expected Discharge Date: 7 days    Discharge Plan: referral for outpatient medication management with a psychiatrist, referral for outpatient psychotherapy    Treatment Plan Created/Updated By: Reynaldo Lopez

## 2018-12-12 NOTE — PLAN OF CARE
Problem: Ineffective Coping  Goal: Participates in unit activities  Interventions:  - Provide therapeutic environment   - Provide required programming   - Redirect inappropriate behaviors    Outcome: Progressing  Pt seclusive to room for most of the day, however does attend therapeutic groups with prompting and encouragement  Pt engaged appropriately in group activities and contributed to discussions if prompted

## 2018-12-13 ENCOUNTER — APPOINTMENT (INPATIENT)
Dept: NON INVASIVE DIAGNOSTICS | Facility: HOSPITAL | Age: 28
DRG: 753 | End: 2018-12-13
Payer: COMMERCIAL

## 2018-12-13 PROBLEM — E11.9 TYPE 2 DIABETES MELLITUS WITHOUT COMPLICATION, WITHOUT LONG-TERM CURRENT USE OF INSULIN (HCC): Status: ACTIVE | Noted: 2018-12-13

## 2018-12-13 PROBLEM — R07.1 CHEST PAIN ON BREATHING: Status: ACTIVE | Noted: 2018-12-13

## 2018-12-13 PROBLEM — E03.9 ACQUIRED HYPOTHYROIDISM: Status: ACTIVE | Noted: 2018-12-13

## 2018-12-13 PROBLEM — E66.01 MORBID OBESITY DUE TO EXCESS CALORIES (HCC): Status: ACTIVE | Noted: 2018-12-13

## 2018-12-13 LAB
ATRIAL RATE: 120 BPM
CRP SERPL QL: 22 MG/L
DEPRECATED D DIMER PPP: <270 NG/ML (FEU)
ERYTHROCYTE [SEDIMENTATION RATE] IN BLOOD: 15 MM/HOUR (ref 0–10)
GLUCOSE SERPL-MCNC: 142 MG/DL (ref 65–140)
GLUCOSE SERPL-MCNC: 199 MG/DL (ref 65–140)
P AXIS: 47 DEGREES
PR INTERVAL: 122 MS
QRS AXIS: 13 DEGREES
QRSD INTERVAL: 98 MS
QT INTERVAL: 342 MS
QTC INTERVAL: 483 MS
T WAVE AXIS: -17 DEGREES
TROPONIN I SERPL-MCNC: <0.02 NG/ML
TROPONIN I SERPL-MCNC: <0.02 NG/ML
VENTRICULAR RATE: 120 BPM

## 2018-12-13 PROCEDURE — 94640 AIRWAY INHALATION TREATMENT: CPT

## 2018-12-13 PROCEDURE — 82948 REAGENT STRIP/BLOOD GLUCOSE: CPT

## 2018-12-13 PROCEDURE — 93306 TTE W/DOPPLER COMPLETE: CPT

## 2018-12-13 PROCEDURE — 84484 ASSAY OF TROPONIN QUANT: CPT | Performed by: PHYSICIAN ASSISTANT

## 2018-12-13 PROCEDURE — 99254 IP/OBS CNSLTJ NEW/EST MOD 60: CPT | Performed by: HOSPITALIST

## 2018-12-13 PROCEDURE — 93306 TTE W/DOPPLER COMPLETE: CPT | Performed by: INTERNAL MEDICINE

## 2018-12-13 PROCEDURE — 99232 SBSQ HOSP IP/OBS MODERATE 35: CPT | Performed by: PSYCHIATRY & NEUROLOGY

## 2018-12-13 PROCEDURE — 86140 C-REACTIVE PROTEIN: CPT | Performed by: PHYSICIAN ASSISTANT

## 2018-12-13 PROCEDURE — 85379 FIBRIN DEGRADATION QUANT: CPT | Performed by: PHYSICIAN ASSISTANT

## 2018-12-13 PROCEDURE — 93005 ELECTROCARDIOGRAM TRACING: CPT

## 2018-12-13 PROCEDURE — 94760 N-INVAS EAR/PLS OXIMETRY 1: CPT

## 2018-12-13 PROCEDURE — 93010 ELECTROCARDIOGRAM REPORT: CPT | Performed by: INTERNAL MEDICINE

## 2018-12-13 PROCEDURE — 85652 RBC SED RATE AUTOMATED: CPT | Performed by: PHYSICIAN ASSISTANT

## 2018-12-13 RX ORDER — ZOLPIDEM TARTRATE 5 MG/1
10 TABLET ORAL
Status: DISCONTINUED | OUTPATIENT
Start: 2018-12-13 | End: 2018-12-18 | Stop reason: HOSPADM

## 2018-12-13 RX ORDER — CARVEDILOL 3.12 MG/1
6.25 TABLET ORAL 2 TIMES DAILY WITH MEALS
Status: DISCONTINUED | OUTPATIENT
Start: 2018-12-13 | End: 2018-12-18 | Stop reason: HOSPADM

## 2018-12-13 RX ORDER — TRAMADOL HYDROCHLORIDE 50 MG/1
50 TABLET ORAL EVERY 6 HOURS PRN
Status: DISCONTINUED | OUTPATIENT
Start: 2018-12-13 | End: 2018-12-18 | Stop reason: HOSPADM

## 2018-12-13 RX ORDER — LEVOTHYROXINE SODIUM 0.03 MG/1
25 TABLET ORAL
Status: DISCONTINUED | OUTPATIENT
Start: 2018-12-14 | End: 2018-12-18 | Stop reason: HOSPADM

## 2018-12-13 RX ORDER — LEVALBUTEROL 1.25 MG/.5ML
1.25 SOLUTION, CONCENTRATE RESPIRATORY (INHALATION) EVERY 8 HOURS PRN
Status: DISCONTINUED | OUTPATIENT
Start: 2018-12-13 | End: 2018-12-18 | Stop reason: HOSPADM

## 2018-12-13 RX ADMIN — ARIPIPRAZOLE 5 MG: 5 TABLET ORAL at 08:14

## 2018-12-13 RX ADMIN — IPRATROPIUM BROMIDE 0.5 MG: 0.5 SOLUTION RESPIRATORY (INHALATION) at 09:24

## 2018-12-13 RX ADMIN — PANTOPRAZOLE SODIUM 20 MG: 20 TABLET, DELAYED RELEASE ORAL at 06:11

## 2018-12-13 RX ADMIN — LEVALBUTEROL 1.25 MG: 1.25 SOLUTION, CONCENTRATE RESPIRATORY (INHALATION) at 09:25

## 2018-12-13 RX ADMIN — ZOLPIDEM TARTRATE 10 MG: 5 TABLET, COATED ORAL at 21:35

## 2018-12-13 RX ADMIN — LORAZEPAM 1 MG: 1 TABLET ORAL at 08:13

## 2018-12-13 RX ADMIN — LISINOPRIL 20 MG: 20 TABLET ORAL at 17:09

## 2018-12-13 RX ADMIN — MONTELUKAST SODIUM 10 MG: 10 TABLET, FILM COATED ORAL at 21:34

## 2018-12-13 RX ADMIN — HYDROCHLOROTHIAZIDE 25 MG: 25 TABLET ORAL at 08:14

## 2018-12-13 RX ADMIN — FLUTICASONE FUROATE AND VILANTEROL TRIFENATATE 1 PUFF: 100; 25 POWDER RESPIRATORY (INHALATION) at 08:15

## 2018-12-13 RX ADMIN — ALBUTEROL SULFATE 2 PUFF: 90 AEROSOL, METERED RESPIRATORY (INHALATION) at 21:34

## 2018-12-13 RX ADMIN — TRAMADOL HYDROCHLORIDE 50 MG: 50 TABLET, COATED ORAL at 14:26

## 2018-12-13 RX ADMIN — CARVEDILOL 6.25 MG: 3.12 TABLET, FILM COATED ORAL at 17:10

## 2018-12-13 RX ADMIN — SERTRALINE HYDROCHLORIDE 100 MG: 100 TABLET ORAL at 08:13

## 2018-12-13 RX ADMIN — LORAZEPAM 1 MG: 1 TABLET ORAL at 17:11

## 2018-12-13 RX ADMIN — ALBUTEROL SULFATE 2 PUFF: 90 AEROSOL, METERED RESPIRATORY (INHALATION) at 06:11

## 2018-12-13 RX ADMIN — METFORMIN HYDROCHLORIDE 500 MG: 500 TABLET ORAL at 17:11

## 2018-12-13 RX ADMIN — HYDROCHLOROTHIAZIDE 25 MG: 25 TABLET ORAL at 17:12

## 2018-12-13 RX ADMIN — LISINOPRIL 20 MG: 20 TABLET ORAL at 08:14

## 2018-12-13 NOTE — PROGRESS NOTES
Progress Note - Stephania 109 29 y o  male MRN: [id number]  Unit/Bed#: [facility] 258-01 Encounter: [id number]    Assessment/Plan   Principal Problem:    Bipolar affective disorder, currently depressed, moderate ([address] )  Active Problems:    Generalized anxiety disorder    Cannabis abuse      Subjective:  Patient today appears constricted and flat during conversation  Reports depression is moderate at this time is with feelings of guilt, hopelessness, poor sleep, lack of energy, and reduction of suicidal ideation  Contracts for safety  Reports poor sleep last night, but did show some benefits from Ambien 5 mg HS PRN  Will add Ambien 10mg in PRN list   Reports anxiety is moderate and reports Ativan is not helping him  Reports racing thoughts at bedtime that keep him up  Due to polysubstance abuse will continue Ativan with possibility of taper and switch to Gabapentin  Reports his mood is not labile  History of sabra  Denies psychotic symptoms  Denied excessive anger and was cooperative in interview  Medication compliant  Appears to be tolerating medications well without serious side effects  Today reports burning chest pain  EKG did not show signs of acute infarction  Troponins were negative  Additional lab work was ordered to rule out inflammatory process  Internal Medicine consulted for additional workup / recommendations and diabetic management (which is new)      Current Medications:  Current Facility-Administered Medications   Medication Dose Route Frequency    acetaminophen (TYLENOL) tablet 650 mg  650 mg Oral Q6H PRN    albuterol (PROVENTIL HFA,VENTOLIN HFA) inhaler 2 puff  2 puff Inhalation Q4H PRN    aluminum-magnesium hydroxide-simethicone (MYLANTA) 200-200-20 mg/5 mL oral suspension 30 mL  30 mL Oral Q4H PRN    ARIPiprazole (ABILIFY) tablet 5 mg  5 mg Oral Daily    benztropine (COGENTIN) injection 1 mg  1 mg Intramuscular Q6H PRN    benztropine (COGENTIN) tablet 1 mg  1 mg Oral Q6H PRN    carvedilol (COREG) tablet 6 25 mg  6 25 mg Oral BID With Meals    fluticasone-vilanterol (BREO ELLIPTA) 100-25 mcg/inh inhaler 1 puff  1 puff Inhalation Daily    haloperidol (HALDOL) tablet 5 mg  5 mg Oral Q6H PRN    haloperidol lactate (HALDOL) injection 5 mg  5 mg Intramuscular Q6H PRN    hydrochlorothiazide (HYDRODIURIL) tablet 25 mg  25 mg Oral BID    hydrOXYzine HCL (ATARAX) tablet 50 mg  50 mg Oral Q6H PRN    insulin lispro (HumaLOG) 100 units/mL subcutaneous injection 2-12 Units  2-12 Units Subcutaneous TID AC    ipratropium (ATROVENT) 0 02 % inhalation solution 0 5 mg  0 5 mg Nebulization Q8H PRN    levalbuterol (XOPENEX) inhalation solution 1 25 mg  1 25 mg Nebulization Q8H PRN    lidocaine (LIDODERM) 5 % patch 1 patch  1 patch Topical Daily    lisinopril (ZESTRIL) tablet 20 mg  20 mg Oral BID    LORazepam (ATIVAN) 2 mg/mL injection 2 mg  2 mg Intramuscular Q6H PRN    LORazepam (ATIVAN) tablet 1 mg  1 mg Oral Q6H PRN    LORazepam (ATIVAN) tablet 1 mg  1 mg Oral BID    magnesium hydroxide (MILK OF MAGNESIA) 400 mg/5 mL oral suspension 30 mL  30 mL Oral Daily PRN    metFORMIN (GLUCOPHAGE) tablet 500 mg  500 mg Oral BID With Meals    montelukast (SINGULAIR) tablet 10 mg  10 mg Oral HS    nicotine (NICODERM CQ) 14 mg/24hr TD 24 hr patch 1 patch  1 patch Transdermal Daily    nicotine polacrilex (NICORETTE) gum 2 mg  2 mg Oral Q2H PRN    OLANZapine (ZyPREXA) IM injection 10 mg  10 mg Intramuscular Q8H PRN    OLANZapine (ZyPREXA) tablet 10 mg  10 mg Oral Q8H PRN    pantoprazole (PROTONIX) EC tablet 20 mg  20 mg Oral Early Morning    risperiDONE (RisperDAL M-TABS) dispersible tablet 1 mg  1 mg Oral Q3H PRN    sertraline (ZOLOFT) tablet 100 mg  100 mg Oral Daily    traMADol (ULTRAM) tablet 50 mg  50 mg Oral Q6H PRN    zolpidem (AMBIEN) tablet 10 mg  10 mg Oral HS PRN       Behavioral Health Medications: all current active meds have been reviewed and continue current psychiatric medications  Vitals:  Vitals:    12/13/18 0929   BP: 135/88   Pulse: (!) 124   Resp:    Temp:    SpO2: 94%       Laboratory results:    I have personally reviewed all pertinent laboratory/tests results  Most Recent Labs:   Lab Results   Component Value Date    WBC 7 93 12/11/2018    RBC 3 97 12/11/2018    HGB 12 4 12/11/2018    HCT 38 7 12/11/2018     12/11/2018    RDW 14 1 12/11/2018    NEUTROABS 4 37 12/11/2018    SODIUM 140 12/12/2018    K 3 6 12/12/2018     12/12/2018    CO2 31 12/12/2018    BUN 10 12/12/2018    CREATININE 0 81 12/12/2018    GLUC 101 12/12/2018    GLUF 101 (H) 12/12/2018    CALCIUM 8 8 12/12/2018    AST 54 (H) 12/12/2018     (H) 12/12/2018    ALKPHOS 110 12/12/2018    TP 7 1 12/12/2018    ALB 3 6 12/12/2018    TBILI 0 30 12/12/2018    BILIDIR 0 07 09/22/2015    CHOLESTEROL 152 12/12/2018    HDL 43 12/12/2018    TRIG 131 12/12/2018    LDLCALC 83 12/12/2018    NONHDLC 109 12/12/2018    UQR9BOGSYJPW 5 450 (H) 12/11/2018    FREET4 0 71 (L) 12/11/2018    RPR Non-Reactive 12/12/2018    HGBA1C 8 1 (H) 12/12/2018     12/12/2018       Psychiatric Review of Systems:  Behavior over the last 24 hours:  unchanged  Sleep:  Poor   Appetite: normal  Medication side effects: No  ROS: Chest pain    Mental Status Evaluation:  Appearance: In hospital attire   Behavior:  guarded   Speech:  soft   Mood:  anxious and depressed   Affect:  constricted and flat   Language Appropriate   Thought Process:  goal directed and Linear   Thought Content:  Denied delusions and obsessions   Perceptual Disturbances: None   Risk Potential: Decreased SI  Denied HI    Potential for aggression no   Sensorium:  person, place and time/date   Cognition:  grossly intact   Consciousness:  awake    Recent and Remote Memory Intact   Attention: attention span appeared shorter than expected for age   Insight:  poor   Judgment: poor   Gait/Station: normal gait/station and normal balance   Motor Activity: no abnormal movements     Progress Toward Goals:  Unchanged    Recommended Treatment: Continue with group therapy, milieu therapy and occupational therapy  1   Due to reported chest pain and new we discovered diabetes will consult Internal Medicine  2  Additional lab work and EKG ordered  3  Add Ambien 10mg HS PRN for insomnia  4  Continue current psychiatric medications  5  Consider addition of Gabapentin for anxiety and reduction of Ativan due to polysubstance abuse  6  Disposition planning    Risks, benefits and possible side effects of Medications:   Risks, benefits, and possible side effects of medications explained to patient and patient verbalizes understanding        Adia Dimas PA-C

## 2018-12-13 NOTE — CONSULTS
Consult- Jihan Locke 1990, 29 y o  male MRN: [id number]    Unit/Bed#: [facility] 258-01 Encounter: [id number]    Primary Care Provider: No primary care provider on file  Date and time admitted to hospital: 12/11/2018  8:55 PM      Inpatient consult to Internal Medicine  Consult performed by: Irma Mitchell ordered by: Diaz Nascimento          Acquired hypothyroidism   Assessment & Plan    -start Levoxyl 25 mcg daily  -repeat TFTs in 4 weeks     Type 2 diabetes mellitus without complication, without long-term current use of insulin ([facility] [address] )   Assessment & Plan    Lab Results   Component Value Date    HGBA1C 8 1 (H) 12/12/2018       Recent Labs      12/13/18   1122   POCGLU  199*       Blood Sugar Average: Last 72 hrs:  (P) 199     -start metformin 500 twice daily  -diabetic diet  -sliding scale insulin     Morbid obesity due to excess calories (HCC)   Assessment & Plan    -encourage weight loss     Chest pain on breathing   Assessment & Plan    -ECG (read by me):  Sinus tachycardia at 120 beats per minute, normal axis,  milliseconds, voltage criteria for LVH, no acute ST or T-wave abnormalities  -patient's chest pain started last night and history troponin, greater than 10 hours after the onset of chest pain, is negative  -patient had chest compressions approximately 2 days ago and this is likely the etiology of his chest pain  -Ultram as needed for pain     Sinus tachycardia   Assessment & Plan    -restart Coreg     Essential hypertension   Assessment & Plan    -currently with hypertensive urgency  -restart beta-blocker  -continue HCTZ, lisinopril         VTE Prophylaxis: None as pt ambulatory on the psych unit    Recommendations for Discharge:  · Diabetic diet  · continue metformin  · continue Coreg, lisinopril, hydrochlorothiazide  · continue Levoxyl  · thyroid function tests in 4 weeks    Counseling / Coordination of Care Time: 45 minutes    Greater than 50% of total time spent on patient counseling and coordination of care  Collaboration of Care: Were Recommendations Directly Discussed with Primary Treatment Team? - Yes     History of Present Illness:    Wally Herrmann is a 29 y o  male who is originally admitted to the psychiatry service due to polysubstance overdose  We are consulted for chest pain  Patient had chest compressions approximately 2 days ago  He reports that since last night he has had a burning and stabbing pain in his sternum  He said it is worse with taking a deep breath and movement  He denies any pressure-like sensations  He denies shortness of breath  Denies any diaphoresis, nausea, vomiting, lightheadedness  Patient denies palpitations, cough, diarrhea, abdominal pain, fevers, chills, night sweats, headache, visual disturbances, neck stiffness, new focal neurologic deficit  Review of Systems:    Review of Systems   All other systems reviewed and are negative  Past Medical and Surgical History:     Past Medical History:   Diagnosis Date    Anxiety     Asthma     Depression     Hypertension     IBS (irritable bowel syndrome)        Past Surgical History:   Procedure Laterality Date    KNEE SURGERY Right     TONSILLECTOMY         Meds/Allergies:    all medications and allergies reviewed    Allergies:    Allergies   Allergen Reactions    Pollen Extract     Toradol [Ketorolac Tromethamine] Hives       Social History:     Marital Status: Single    Substance Use History:   History   Alcohol Use No     Comment: denies      History   Smoking Status    Former Smoker    Types: Cigarettes    Quit date: 10/1/2018   Smokeless Tobacco    Never Used     History   Drug Use    Types: MDMA (ecstacy), Marijuana, Methamphetamines, Cocaine     Comment: reports recent use (overdose) only        Family History:    non-contributory    Physical Exam:     Vitals:   Blood Pressure: 135/88 (12/13/18 0929)  Pulse: (!) 124 (12/13/18 0929)  Temperature: 98 1 °F (36 7 °C) (12/13/18 0749)  Temp Source: Tympanic (12/13/18 0749)  Respirations: 18 (12/13/18 0749)  Height: 5' 11" (180 3 cm) (12/11/18 2113)  Weight - Scale: (!) 142 kg (313 lb) (12/11/18 2113)  SpO2: 94 % (12/13/18 0929)    Physical Exam    Gen: NAD, AAOx3, well developed, well nourished  Eyes: EOMI, PERRLA, no scleral icterus  ENMT:  Oropharynx clear of erythema or exudates, no nasal discharge, no otic discharge, moist mucous membranes  Neck:  Supple  Cardiovascular:  Tachycardic, regular rhythm, normal S1-S2, no murmurs, rubs, or gallops  Lungs:  Clear to auscultation bilaterally, no wheezes, or rales, or rhonchi  Abdomen:  Positive bowel sounds, soft, nontender, nondistended, no palpable organomegaly   Skin:  Intact, no obvious lesions or rashes, no edema  Neuro: Cranial nerves 2-12 are intact, non-focal, 5/5 strength in all 4 extremities      Additional Data:     Lab Results: I have personally reviewed pertinent reports  Results from last 7 days  Lab Units 12/11/18  0624   WBC Thousand/uL 7 93   HEMOGLOBIN g/dL 12 4   HEMATOCRIT % 38 7   PLATELETS Thousands/uL 252   NEUTROS PCT % 54   LYMPHS PCT % 31   MONOS PCT % 7   EOS PCT % 6       Results from last 7 days  Lab Units 12/12/18  0634   SODIUM mmol/L 140   POTASSIUM mmol/L 3 6   CHLORIDE mmol/L 100   CO2 mmol/L 31   BUN mg/dL 10   CREATININE mg/dL 0 81   ANION GAP mmol/L 9   CALCIUM mg/dL 8 8   ALBUMIN g/dL 3 6   TOTAL BILIRUBIN mg/dL 0 30   ALK PHOS U/L 110   ALT U/L 143*   AST U/L 54*   GLUCOSE RANDOM mg/dL 101           Results from last 7 days  Lab Units 12/13/18  1131   TROPONIN I ng/mL <0 02     Lab Results   Component Value Date/Time    HGBA1C 8 1 (H) 12/12/2018 06:34 AM       Results from last 7 days  Lab Units 12/13/18  1122   POC GLUCOSE mg/dl 199*           Imaging: I have personally reviewed pertinent reports  No orders to display       ** Please Note: This note has been constructed using a voice recognition system   **

## 2018-12-13 NOTE — RESPIRATORY THERAPY NOTE
RT Protocol Note  Chay Clay 29 y o  male MRN: [id number]  Unit/Bed#: [facility] 258-01 Encounter: [id number]    Assessment    Principal Problem:    Bipolar affective disorder, currently depressed, moderate ([address] )  Active Problems:    Generalized anxiety disorder    Cannabis abuse      Home Pulmonary Medications:  Albuterol neb/mdi       Past Medical History:   Diagnosis Date    Anxiety     Asthma     Depression     Hypertension     IBS (irritable bowel syndrome)      Social History     Social History    Marital status: Single     Spouse name: N/A    Number of children: N/A    Years of education: N/A     Social History Main Topics    Smoking status: Former Smoker     Types: Cigarettes     Quit date: 10/1/2018    Smokeless tobacco: Never Used    Alcohol use No      Comment: denies     Drug use: Yes     Types: MDMA (ecstacy), Marijuana, Methamphetamines, Cocaine      Comment: reports recent use (overdose) only     Sexual activity: Not Asked     Other Topics Concern    None     Social History Narrative    None       Subjective         Objective    Physical Exam:   Assessment Type: Pre-treatment  General Appearance: Awake, Alert  Respiratory Pattern: Normal  Chest Assessment: Chest expansion symmetrical  Bilateral Breath Sounds: Clear, Diminished  Cough: None    Vitals:  Blood pressure 157/96, pulse (!) 110, temperature 98 1 °F (36 7 °C), temperature source Tympanic, resp  rate 18, height 5' 11" (1 803 m), weight (!) 142 kg (313 lb), SpO2 94 %            Imaging and other studies: I have personally reviewed pertinent films in PACS    O2 Device: RA     Plan    Respiratory Plan: Home Bronchodilator Patient pathway        Resp Comments: plan to change neb tx's to prn as per home use/BBS clear at this time

## 2018-12-13 NOTE — CASE MANAGEMENT
CW contacted [address] @ [phone] & was able to reschedule Pt's appointment from today to 1/22 at 2:20 PM; this was the next available appointment  Pt will see Dr Stef Higgins confirmed fax number [phone] to send discharge information  CW contacted [facility] @ [phone] & was told that Pt was scheduled to come in to be re-opened for services, however, he didn't keep the appointment  CW was informed that the intake department comes in at 11 AM, & someone would call CW back after that time  CW contacted Pt's employer, [employer], @ [phone] who reported Pt would just need a note, stating the dates he was in the hospital, & that he is cleared to return to work  CW contacted Pt's mom, Jerri Chapa, @ left a message seeking a returned call

## 2018-12-13 NOTE — PLAN OF CARE
Problem: DISCHARGE PLANNING  Goal: Discharge to home or other facility with appropriate resources  INTERVENTIONS:  - Identify barriers to discharge w/patient and caregiver  - Arrange for needed discharge resources and transportation as appropriate  - Identify discharge learning needs (meds, wound care, etc )  - Arrange for interpretive services to assist at discharge as needed  - Refer to Case Management Department for coordinating discharge planning if the patient needs post-hospital services based on physician/advanced practitioner order or complex needs related to functional status, cognitive ability, or social support system   Outcome: Progressing  Pt with CW to review & sign Tx Plan & ROIs & complete intake

## 2018-12-13 NOTE — ASSESSMENT & PLAN NOTE
Lab Results   Component Value Date    HGBA1C 8 1 (H) 12/12/2018       Recent Labs      12/13/18   1122   POCGLU  199*       Blood Sugar Average: Last 72 hrs:  (P) 199     -start metformin 500 twice daily  -diabetic diet  -sliding scale insulin

## 2018-12-13 NOTE — PROGRESS NOTES
Patient calm and cooperative, scant in conversation  States feeling a little better that "I am alive" and "don't feel like killing myself today "  Feels the changes in medications will help and needs to wait a few days to know truly how the medication is helping  No complaints or questions at this time with treatment plan  Denies SI and feels anxiety and depression is lower  Verbally contracts for safety  Will continue to monitor

## 2018-12-13 NOTE — ASSESSMENT & PLAN NOTE
-ECG (read by me):  Sinus tachycardia at 120 beats per minute, normal axis,  milliseconds, voltage criteria for LVH, no acute ST or T-wave abnormalities  -patient's chest pain started last night and history troponin, greater than 10 hours after the onset of chest pain, is negative  -patient had chest compressions approximately 2 days ago and this is likely the etiology of his chest pain  -Ultram as needed for pain

## 2018-12-13 NOTE — PROGRESS NOTES
Patient performed incentive spirometry  Reassessed vitals  BP improved, HR remains tachy  Patient complained of midline chest pain 5/10  Describes it as a stabbing/burning pain that started last night and gradually getting worse  Denies and numbness or tingling  Physician assistant made aware

## 2018-12-13 NOTE — CASE MANAGEMENT
CW & Pt reviewed & signed Tx Plan     CW & Pt reviewed & signed ROIs for Lilian Goins(mom), [employer](employer), [facility](PCP), and outpatient referral([facility])  Pt is a 28 y/o male who reported that he had been struggling with his anxiety & depression, and was feeling overwhelmed  Pt said that he had tried to reach out to his supports to let them know that he was feeling this way, but they were too busy & didn't take him serious  Pt said that he doesn't remember what he took, after the cough syrup  Pt said that he was at a social gathering, not at his home, & just started taking whatever he could find  Pt is single(has a girlfriend) & no children  Pt said that he lives with his mom, who is a support, but also a stressor/trigger for him; he does not have a relationship with his father  Pt said that he has 3 sister from his mom, and 3 sisters and 3 brothers from his dad  Pt said that he has the typical sibling relationships, in that they fight & argue, but ultimately care for one another  Pt reported that his mom attempted suicide when he was little & she struggles with depression & anxiety, and his maternal grandmother has anxiety as well  Pt reported one prior suicide attempt at age 16, when he cut himself & was hospitalized at [facility]  Pt said that the stressor at that time was that he lost 4 cousins in a fire  Pt reported financial, relationship, & recently loss of a cousin who was murdered in September as stressors  Pt reported he had been asking his PCP for a referral for outpatient, but he didn't give him one & didn't seem interested in changing his medications  Pt said that he was switching to a new PCP & had an appointment today at [facility]     Pt said that he went to [facility] in the past, & was agreeable with being referred there again    He said that he began working crazy hours at his job, & that is why he stopped his treatment in the past     Pt said that he doesn't really use drugs, but said, "I was thinking it would be less messy than blood, so that it why I took all that stuff"  Pt said that he does use THC, however, last use, prior to his suicide attempt was about one and a half months ago; Pt said that he uses the edibles  Pt said that he first tried 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

## 2018-12-13 NOTE — PROGRESS NOTES
Patient denies SI/HI, depression a 6/10 and anxiety 7/10  Feels that anxiety is related to poor sleep last night  Said he only slept for 1 hour  Scheduled Ativan given with his morning meds  Will follow up to see if his sx improve  Patient says he doesn't feel the Ativan is working and he will tell the doctor during visit  Reports eating less and says he is drinking fluids  Writer observed patient using incentive spirometer correctly  Informed him it is to be repeated every hour  Will remind patient throughout the day as well

## 2018-12-14 LAB
GLUCOSE SERPL-MCNC: 115 MG/DL (ref 65–140)
GLUCOSE SERPL-MCNC: 121 MG/DL (ref 65–140)
GLUCOSE SERPL-MCNC: 122 MG/DL (ref 65–140)
GLUCOSE SERPL-MCNC: 145 MG/DL (ref 65–140)

## 2018-12-14 PROCEDURE — 82948 REAGENT STRIP/BLOOD GLUCOSE: CPT

## 2018-12-14 PROCEDURE — 99232 SBSQ HOSP IP/OBS MODERATE 35: CPT | Performed by: PSYCHIATRY & NEUROLOGY

## 2018-12-14 RX ADMIN — MONTELUKAST SODIUM 10 MG: 10 TABLET, FILM COATED ORAL at 21:29

## 2018-12-14 RX ADMIN — FLUTICASONE FUROATE AND VILANTEROL TRIFENATATE 1 PUFF: 100; 25 POWDER RESPIRATORY (INHALATION) at 12:32

## 2018-12-14 RX ADMIN — ZOLPIDEM TARTRATE 10 MG: 5 TABLET, COATED ORAL at 21:30

## 2018-12-14 RX ADMIN — PANTOPRAZOLE SODIUM 20 MG: 20 TABLET, DELAYED RELEASE ORAL at 06:19

## 2018-12-14 RX ADMIN — CARVEDILOL 6.25 MG: 3.12 TABLET, FILM COATED ORAL at 16:59

## 2018-12-14 RX ADMIN — METFORMIN HYDROCHLORIDE 500 MG: 500 TABLET ORAL at 16:59

## 2018-12-14 RX ADMIN — LORAZEPAM 1 MG: 1 TABLET ORAL at 18:09

## 2018-12-14 RX ADMIN — SERTRALINE HYDROCHLORIDE 100 MG: 100 TABLET ORAL at 08:17

## 2018-12-14 RX ADMIN — METFORMIN HYDROCHLORIDE 500 MG: 500 TABLET ORAL at 08:16

## 2018-12-14 RX ADMIN — TRAMADOL HYDROCHLORIDE 50 MG: 50 TABLET, COATED ORAL at 12:32

## 2018-12-14 RX ADMIN — CARVEDILOL 6.25 MG: 3.12 TABLET, FILM COATED ORAL at 08:17

## 2018-12-14 RX ADMIN — LISINOPRIL 20 MG: 20 TABLET ORAL at 18:09

## 2018-12-14 RX ADMIN — LISINOPRIL 20 MG: 20 TABLET ORAL at 08:17

## 2018-12-14 RX ADMIN — LEVOTHYROXINE SODIUM 25 MCG: 25 TABLET ORAL at 06:19

## 2018-12-14 RX ADMIN — HYDROCHLOROTHIAZIDE 25 MG: 25 TABLET ORAL at 18:09

## 2018-12-14 RX ADMIN — ARIPIPRAZOLE 5 MG: 5 TABLET ORAL at 08:17

## 2018-12-14 RX ADMIN — LORAZEPAM 1 MG: 1 TABLET ORAL at 08:17

## 2018-12-14 RX ADMIN — HYDROCHLOROTHIAZIDE 25 MG: 25 TABLET ORAL at 08:17

## 2018-12-14 NOTE — PROGRESS NOTES
Progress Note - Stephania 109 29 y o  male MRN: [id number]  Unit/Bed#: [facility] 258-01 Encounter: [id number]    Assessment/Plan   Principal Problem:    Bipolar affective disorder, currently depressed, moderate ([facility] [address] )  Active Problems:    Essential hypertension    Sinus tachycardia    Generalized anxiety disorder    Cannabis abuse    Chest pain on breathing    Morbid obesity due to excess calories ([facility] [address] )    Type 2 diabetes mellitus without complication, without long-term current use of insulin ([facility])    Acquired hypothyroidism    Subjective:  Patient remained compliant with medications with no acute side effects  Patient reports sleeping well last night after longtime  Patient does report improvement in sleep with less frequent awakening and him feeling more relaxed in the morning time with slow improvement in focus and concentration  No manic or psychotic symptoms noted  Patient was having chest pain yesterday and underwent medical evaluation and was seen by Medicine  Patient reports this chest pain is likely from him getting chest compression while he was in the ER  His chest pain is more with chest movement and appears musculoskeletal in nature  Patient is not radiating at this point  Patient does report feeling safe on the unit and agreed with treatment planning at this point      Current Medications:    Current Facility-Administered Medications:  acetaminophen 650 mg Oral Q6H PRN Jovanny Sloan PA-C   albuterol 2 puff Inhalation Q4H PRN Jennifer Snowden MD   aluminum-magnesium hydroxide-simethicone 30 mL Oral Q4H PRN Jovanny Sloan PA-C   ARIPiprazole 5 mg Oral Daily Bello Chase   benztropine 1 mg Intramuscular Q6H PRN Jovanny Sloan PA-C   benztropine 1 mg Oral Q6H PRN Jovanny Sloan PA-C   carvedilol 6 25 mg Oral BID With Meals Gladis Mandujano MD   fluticasone-vilanterol 1 puff Inhalation Daily Jennifer Snowden MD   haloperidol 5 mg Oral Q6H PRN Rakesh Mejia GINETTE Lewis   haloperidol lactate 5 mg Intramuscular Q6H PRN Zondra Bagino, PA-C   hydrochlorothiazide 25 mg Oral BID Williams Found, MD   hydrOXYzine HCL 50 mg Oral Q6H PRN Zondra Bagino, PA-C   insulin lispro 2-12 Units Subcutaneous TID AC Cece Pink, MD   ipratropium 0 5 mg Nebulization Q8H PRN [facility]   levalbuterol 1 25 mg Nebulization Q8H PRN [facility]   levothyroxine 25 mcg Oral Early Morning Cece Pink, MD   lidocaine 1 patch Topical Daily Williams Found, MD   lisinopril 20 mg Oral BID Williams Found, MD   LORazepam 2 mg Intramuscular Q6H PRN Zona Jigar, PA-C   LORazepam 1 mg Oral Q6H PRN Zondra Bagino, PA-C   LORazepam 1 mg Oral BID [facility]   magnesium hydroxide 30 mL Oral Daily PRN Zondra Jigar, PA-C   metFORMIN 500 mg Oral BID With Meals Cece Pink, MD   montelukast 10 mg Oral HS Williams Found, MD   nicotine 1 patch Transdermal Daily Williams Found, MD   nicotine polacrilex 2 mg Oral Q2H PRN Zondra Bagino, PA-C   OLANZapine 10 mg Intramuscular Q8H PRN Zondra Bagino, PA-C   OLANZapine 10 mg Oral Q8H PRN Zondra Bagino, PA-C   pantoprazole 20 mg Oral Early Morning Williams Found, MD   risperiDONE 1 mg Oral Q3H PRN Zondra Bagino, PA-C   sertraline 100 mg Oral Daily Williams Found, MD   traMADol 50 mg Oral Q6H PRN Cece Pink, MD   zolpidem 10 mg Oral HS PRN Zondra Jigar, PA-C       Behavioral Health Medications: all current active meds have been reviewed  Vital signs in last 24 hours:  Temp:  [98 7 °F (37 1 °C)] 98 7 °F (37 1 °C)  HR:  [125-127] 127  Resp:  [18-20] 20  BP: (120-137)/(62-88) 127/62    Laboratory results:    I have personally reviewed all pertinent laboratory/tests results    Labs in last 72 hours:   Recent Labs      12/12/18   0634   SODIUM  140   K  3 6   CL  100   CO2  31   BUN  10   CREATININE  0 81   GLUC  101   GLUF  101*   CALCIUM  8 8   AST  54*   ALT  143* ALKPHOS  110   TP  7 1   ALB  3 6   TBILI  0 30   CHOLESTEROL  152   HDL  43   TRIG  131   LDLCALC  83   RPR  Non-Reactive     Admission Labs:   Admission on 12/11/2018   Component Date Value    Sodium 12/12/2018 140     Potassium 12/12/2018 3 6     Chloride 12/12/2018 100     CO2 12/12/2018 31     ANION GAP 12/12/2018 9     BUN 12/12/2018 10     Creatinine 12/12/2018 0 81     Glucose 12/12/2018 101     Glucose, Fasting 12/12/2018 101*    Calcium 12/12/2018 8 8     AST 12/12/2018 54*    ALT 12/12/2018 143*    Alkaline Phosphatase 12/12/2018 110     Total Protein 12/12/2018 7 1     Albumin 12/12/2018 3 6     Total Bilirubin 12/12/2018 0 30     eGFR 12/12/2018 121     Cholesterol 12/12/2018 152     Triglycerides 12/12/2018 131     HDL, Direct 12/12/2018 43     LDL Calculated 12/12/2018 83     Non-HDL-Chol (CHOL-HDL) 12/12/2018 109     RPR 12/12/2018 Non-Reactive     Hemoglobin A1C 12/12/2018 8 1*    EAG 12/12/2018 186     D-Dimer, Quant 12/13/2018 <270     Troponin I 12/13/2018 <0 02     CRP 12/13/2018 22 0*    Sed Rate 12/13/2018 15*    POC Glucose 12/13/2018 199*    Troponin I 12/13/2018 <0 02     Ventricular Rate 12/13/2018 120     Atrial Rate 12/13/2018 120     NM Interval 12/13/2018 122     QRSD Interval 12/13/2018 98     QT Interval 12/13/2018 342     QTC Interval 12/13/2018 483     P Axis 12/13/2018 47     QRS Axis 12/13/2018 13     T Wave Axis 12/13/2018 -17     POC Glucose 12/13/2018 142*    POC Glucose 12/14/2018 115     POC Glucose 12/14/2018 145*       Psychiatric Review of Systems:  Behavior over the last 24 hours:  Slow improvement  Sleep:  Slow improvement  Appetite: normal  Medication side effects: No  ROS: no complaints    Mental Status Evaluation:  Appearance:  casually dressed   Behavior:  guarded   Speech:  soft   Mood:  anxious and depressed   Affect:  constricted   Language naming objects   Thought Process:  circumstantial   Thought Content: obsessions   Perceptual Disturbances: None   Risk Potential: Suicidal Ideations without plan, Homicidal Ideations none and Potential for Aggression No   Sensorium:  person and place   Cognition:  grossly intact   Consciousness:  awake    Attention: attention span appeared shorter than expected for age   Insight:  limited   Judgment: limited   Intellect fair   Gait/Station: normal gait/station   Motor Activity: no abnormal movements     Memory: Short and long term memory  fair     Progress Toward Goals: slow progress    Recommended Treatment:   Continue Zoloft 150 mg daily for depression management  Continue Abilify 5 mg daily as augmentation for antidepressant response for depression management  Continue Ativan 1 mg b i d  For anxiety management  Continue Ambien 10 mg at HS for insomnia  Continue with group therapy, milieu therapy and occupational therapy      Continue following current medications:   Current Facility-Administered Medications:  acetaminophen 650 mg Oral Q6H PRN Sahnnon Mcdaniels PA-C   albuterol 2 puff Inhalation Q4H PRN Javier Grant MD   aluminum-magnesium hydroxide-simethicone 30 mL Oral Q4H PRN Shannon Mcdaniels PA-C   ARIPiprazole 5 mg Oral Daily Bello Chase   benztropine 1 mg Intramuscular Q6H PRN Shannon Mcdaniels PA-C   benztropine 1 mg Oral Q6H PRN Shannon Mcdaniels PA-C   carvedilol 6 25 mg Oral BID With Meals Radha Marie MD   fluticasone-vilanterol 1 puff Inhalation Daily Javier Grant MD   haloperidol 5 mg Oral Q6H PRN Shannon Mcdaniels PA-C   haloperidol lactate 5 mg Intramuscular Q6H PRN Shannon Mcdaniels PA-C   hydrochlorothiazide 25 mg Oral BID Javier Grant MD   hydrOXYzine HCL 50 mg Oral Q6H PRN Shannon Mcdaniels PA-C   insulin lispro 2-12 Units Subcutaneous TID AC Radha Marie MD   ipratropium 0 5 mg Nebulization Q8H PRN Bello Chase   levalbuterol 1 25 mg Nebulization Q8H PRN Bello Chase   levothyroxine 25 mcg Oral Early Morning Hilario Mathias MD   lidocaine 1 patch Topical Daily Johnny Holland MD   lisinopril 20 mg Oral BID Johnny Holland MD   LORazepam 2 mg Intramuscular Q6H PRN Brigid Funez PA-C   LORazepam 1 mg Oral Q6H PRN Brigid Funez, PA-REBECCA   LORazepam 1 mg Oral BID Bello Chase   magnesium hydroxide 30 mL Oral Daily PRN Brigid Funez, GINETTE   metFORMIN 500 mg Oral BID With Meals Hilario Mathias MD   montelukast 10 mg Oral HS Johnny Holland MD   nicotine 1 patch Transdermal Daily Johnny Holland MD   nicotine polacrilex 2 mg Oral Q2H PRN Brigid Funez, GINETTE   OLANZapine 10 mg Intramuscular Q8H PRN Brigid Funez, PA-REBECCA   OLANZapine 10 mg Oral Q8H PRN Brigid Funez, GINETTE   pantoprazole 20 mg Oral Early Morning Johnny Holland MD   risperiDONE 1 mg Oral Q3H PRN Brigid Funez PA-C   sertraline 100 mg Oral Daily Johnny Holland MD   traMADol 50 mg Oral Q6H PRN Hilario Mathias MD   zolpidem 10 mg Oral HS PRN Brigid Funez PA-C       Risks, benefits and possible side effects of Medications:   Risks, benefits, and possible side effects of medications explained to patient and patient verbalizes understanding  This note has been constructed using a voice recognition system  There may be translation, syntax,  or grammatical errors  If you have any questions, please contact the dictating provider

## 2018-12-14 NOTE — PROGRESS NOTES
Patient denies SI/HI  Says his anxiety is manageable and is using coping skills  Currently reading book in his room  Reports his depression is improving  His chest is still sore, reports pain 6/10  He has been compliant with the incentive spirometer  Says he had to take it easy taking deep breaths  Pleasant and cooperative during interaction  Visible in milieu

## 2018-12-14 NOTE — PROGRESS NOTES
New belongings brought in from visit    In locker:    Chips x2  [place] w/ strings  Thera-puty  Body spray (used not new)  [place] (used not new)  Case of coke (patient was told he cannot have regular soda due to diabetes   In locker until girlfriend visits again and can take home)    At bedside:  Gum

## 2018-12-14 NOTE — CASE MANAGEMENT
CW contacted [facility] @ [phone] & spoke with Rogelio Bojorquez, about scheduling Pt an intake  She said that Pt was scheduled a re-open appointment 11/1, but he was a no-show  CW asked if he would not be able to return & she said that she would have to pull his record for the management to review & then they would determine if he could be given another appointment  CW asked how long this would take, & she said it may take a few days  CW attempted to contact Pt's mom, [place], @ [phone], however, the call went straight to voicemail  CW left a message seeking a returned call

## 2018-12-14 NOTE — PROGRESS NOTES
Spoke with pt at length about diabetes and diabetes mgmt  Advised pt that he currently had Type 2 diabetes  It can be controlled by diet, exercise and metformin to begin with  Explained that we give Insulin her at the hospital until diabetes is better controlled  Explained to pt to take blood sugar checks before meals for accurate bs  Pt listened eagerly and asked questions  Gave handouts per folder and added information in packet from the American Diabetes Association  Encourage journal of food (meals) and blood sugars  Explained it may resolve with diet, exercise and wt loss but currently it needs to be monitored  Blood sugar at 16:25p was 142 mg/dL  Sliding Scale insulin was held due to parameter starting of 150 mg/dL to be given  Encouraged pt to ask questions and follow up with PCP after discharge

## 2018-12-14 NOTE — PLAN OF CARE
Problem: DISCHARGE PLANNING  Goal: Discharge to home or other facility with appropriate resources  INTERVENTIONS:  - Identify barriers to discharge w/patient and caregiver  - Arrange for needed discharge resources and transportation as appropriate  - Identify discharge learning needs (meds, wound care, etc )  - Arrange for interpretive services to assist at discharge as needed  - Refer to Case Management Department for coordinating discharge planning if the patient needs post-hospital services based on physician/advanced practitioner order or complex needs related to functional status, cognitive ability, or social support system   Outcome: Progressing  Pt was notified of the warrants he has & he plans to talk to his mom & an

## 2018-12-14 NOTE — CASE MANAGEMENT
CW met with Pt to review calls made to [facility] & his rescheduled PCP appointment  Pt was recently diagnosed with DBM Type II, & CW said that she would call back to see if due to this, they can put him on a cancellation list to be seen sooner by PCP  CW asked Pt again about his legal status & he denied any legal issues  CW said that she was given information from the unit manager, that [facility] had called & reported Pt had warrants  Pt reported he didn't know what they would be in regards to   CW reported that she had checked [place] Docket Sheets & he had charges for [employer] from 6/1/18  Pt didn't confirm or deny these charges  CW asked if Pt wanted to call with her, or sign an NALLELY so that she could call? Pt asked for the phone number & said he would call himself  CW offered for him to use her cordless phone in the quiet room for privacy, however, Pt declined to do so now, & said he would call on Monday, after he talked to his mom & his   CW asked if Pt had a phone number for his mom, but he said no  CW reviewed that Werner Quiles was listed as an emergency contact as well for him & asked if he would want to sign an NALLELY so that CW could outreach to her, but Pt declined, stating he will call her & let her know information  CW provided Pt with the phone number for Deputy Jeyson Alvarado & a copy of the docket sheet

## 2018-12-14 NOTE — PROGRESS NOTES
Pt requested and received PRN Tramadol for 6/10 right knee pain  Reports it has been mildly effective

## 2018-12-15 LAB
GLUCOSE SERPL-MCNC: 103 MG/DL (ref 65–140)
GLUCOSE SERPL-MCNC: 112 MG/DL (ref 65–140)
GLUCOSE SERPL-MCNC: 156 MG/DL (ref 65–140)
GLUCOSE SERPL-MCNC: 159 MG/DL (ref 65–140)

## 2018-12-15 PROCEDURE — 99231 SBSQ HOSP IP/OBS SF/LOW 25: CPT | Performed by: PSYCHIATRY & NEUROLOGY

## 2018-12-15 PROCEDURE — 82948 REAGENT STRIP/BLOOD GLUCOSE: CPT

## 2018-12-15 RX ADMIN — LISINOPRIL 20 MG: 20 TABLET ORAL at 09:12

## 2018-12-15 RX ADMIN — PANTOPRAZOLE SODIUM 20 MG: 20 TABLET, DELAYED RELEASE ORAL at 06:57

## 2018-12-15 RX ADMIN — TRAMADOL HYDROCHLORIDE 50 MG: 50 TABLET, COATED ORAL at 09:10

## 2018-12-15 RX ADMIN — LORAZEPAM 1 MG: 1 TABLET ORAL at 17:53

## 2018-12-15 RX ADMIN — FLUTICASONE FUROATE AND VILANTEROL TRIFENATATE 1 PUFF: 100; 25 POWDER RESPIRATORY (INHALATION) at 09:00

## 2018-12-15 RX ADMIN — INSULIN LISPRO 2 UNITS: 100 INJECTION, SOLUTION INTRAVENOUS; SUBCUTANEOUS at 11:31

## 2018-12-15 RX ADMIN — LEVOTHYROXINE SODIUM 25 MCG: 25 TABLET ORAL at 06:57

## 2018-12-15 RX ADMIN — METFORMIN HYDROCHLORIDE 500 MG: 500 TABLET ORAL at 16:51

## 2018-12-15 RX ADMIN — METFORMIN HYDROCHLORIDE 500 MG: 500 TABLET ORAL at 09:11

## 2018-12-15 RX ADMIN — LORAZEPAM 1 MG: 1 TABLET ORAL at 09:11

## 2018-12-15 RX ADMIN — ZOLPIDEM TARTRATE 10 MG: 5 TABLET, COATED ORAL at 21:36

## 2018-12-15 RX ADMIN — HYDROCHLOROTHIAZIDE 25 MG: 25 TABLET ORAL at 17:53

## 2018-12-15 RX ADMIN — CARVEDILOL 6.25 MG: 3.12 TABLET, FILM COATED ORAL at 16:51

## 2018-12-15 RX ADMIN — HYDROCHLOROTHIAZIDE 25 MG: 25 TABLET ORAL at 09:11

## 2018-12-15 RX ADMIN — MONTELUKAST SODIUM 10 MG: 10 TABLET, FILM COATED ORAL at 21:13

## 2018-12-15 RX ADMIN — CARVEDILOL 6.25 MG: 3.12 TABLET, FILM COATED ORAL at 09:12

## 2018-12-15 RX ADMIN — TRAMADOL HYDROCHLORIDE 50 MG: 50 TABLET, COATED ORAL at 19:45

## 2018-12-15 RX ADMIN — SERTRALINE HYDROCHLORIDE 100 MG: 100 TABLET ORAL at 09:11

## 2018-12-15 RX ADMIN — LISINOPRIL 20 MG: 20 TABLET ORAL at 17:53

## 2018-12-15 RX ADMIN — ARIPIPRAZOLE 5 MG: 5 TABLET ORAL at 09:11

## 2018-12-15 NOTE — PROGRESS NOTES
Med Note: Pt refused Lidoderm patch, stated he didn't need it and refused Inhaler as well, stating he didn't need it  Will make MD aware

## 2018-12-15 NOTE — PROGRESS NOTES
Progress Note - Stephania [age] y o  male MRN: [id number]  Unit/Bed#: [facility] 258-01 Encounter: [id number]    Assessment/Plan   Principal Problem:    Bipolar affective disorder, currently depressed, moderate ([address] )  Active Problems:    Essential hypertension    Sinus tachycardia    Generalized anxiety disorder    Cannabis abuse    Chest pain on breathing    Morbid obesity due to excess calories (HCC)    Type 2 diabetes mellitus without complication, without long-term current use of insulin (HCC)    Acquired hypothyroidism      Behavior over the last 24 hours:  improved  Sleep: normal  Appetite: normal  Medication side effects: No  ROS: no complaints    Mental Status Evaluation:  Appearance:  age appropriate   Behavior:  normal   Speech:  normal pitch and normal volume   Mood:  normal   Affect:  normal   Thought Process:  normal   Thought Content:  normal   Perceptual Disturbances: None   Risk Potential: thony for safety in unit milieu   Sensorium:  person, place and time/date   Cognition:  grossly intact   Consciousness:  alert    Attention: attention span and concentration were age appropriate   Insight:  age appropriate   Judgment: age appropriate   Gait/Station: normal gait/station   Motor Activity: no abnormal movements     Progress Toward Goals:  Patient is reporting improvement from the previous day  He feels that the increase in Ambien has been of benefit for him  Recommended Treatment: Continue with group therapy, milieu therapy and occupational therapy  Risks, benefits and possible side effects of Medications:   Risks, benefits, and possible side effects of medications explained to patient and patient verbalizes understanding        Medications:   all current active meds have been reviewed and current meds:   Current Facility-Administered Medications   Medication Dose Route Frequency    acetaminophen (TYLENOL) tablet 650 mg  650 mg Oral Q6H PRN    albuterol (PROVENTIL HFA,VENTOLIN HFA) inhaler 2 puff  2 puff Inhalation Q4H PRN    aluminum-magnesium hydroxide-simethicone (MYLANTA) 200-200-20 mg/5 mL oral suspension 30 mL  30 mL Oral Q4H PRN    ARIPiprazole (ABILIFY) tablet 5 mg  5 mg Oral Daily    benztropine (COGENTIN) injection 1 mg  1 mg Intramuscular Q6H PRN    benztropine (COGENTIN) tablet 1 mg  1 mg Oral Q6H PRN    carvedilol (COREG) tablet 6 25 mg  6 25 mg Oral BID With Meals    fluticasone-vilanterol (BREO ELLIPTA) 100-25 mcg/inh inhaler 1 puff  1 puff Inhalation Daily    haloperidol (HALDOL) tablet 5 mg  5 mg Oral Q6H PRN    haloperidol lactate (HALDOL) injection 5 mg  5 mg Intramuscular Q6H PRN    hydrochlorothiazide (HYDRODIURIL) tablet 25 mg  25 mg Oral BID    hydrOXYzine HCL (ATARAX) tablet 50 mg  50 mg Oral Q6H PRN    insulin lispro (HumaLOG) 100 units/mL subcutaneous injection 2-12 Units  2-12 Units Subcutaneous TID AC    ipratropium (ATROVENT) 0 02 % inhalation solution 0 5 mg  0 5 mg Nebulization Q8H PRN    levalbuterol (XOPENEX) inhalation solution 1 25 mg  1 25 mg Nebulization Q8H PRN    levothyroxine tablet 25 mcg  25 mcg Oral Early Morning    lidocaine (LIDODERM) 5 % patch 1 patch  1 patch Topical Daily    lisinopril (ZESTRIL) tablet 20 mg  20 mg Oral BID    LORazepam (ATIVAN) 2 mg/mL injection 2 mg  2 mg Intramuscular Q6H PRN    LORazepam (ATIVAN) tablet 1 mg  1 mg Oral Q6H PRN    LORazepam (ATIVAN) tablet 1 mg  1 mg Oral BID    magnesium hydroxide (MILK OF MAGNESIA) 400 mg/5 mL oral suspension 30 mL  30 mL Oral Daily PRN    metFORMIN (GLUCOPHAGE) tablet 500 mg  500 mg Oral BID With Meals    montelukast (SINGULAIR) tablet 10 mg  10 mg Oral HS    nicotine (NICODERM CQ) 14 mg/24hr TD 24 hr patch 1 patch  1 patch Transdermal Daily    nicotine polacrilex (NICORETTE) gum 2 mg  2 mg Oral Q2H PRN    OLANZapine (ZyPREXA) IM injection 10 mg  10 mg Intramuscular Q8H PRN    OLANZapine (ZyPREXA) tablet 10 mg  10 mg Oral Q8H PRN    pantoprazole (PROTONIX) EC tablet 20 mg  20 mg Oral Early Morning    risperiDONE (RisperDAL M-TABS) dispersible tablet 1 mg  1 mg Oral Q3H PRN    sertraline (ZOLOFT) tablet 100 mg  100 mg Oral Daily    traMADol (ULTRAM) tablet 50 mg  50 mg Oral Q6H PRN    zolpidem (AMBIEN) tablet 10 mg  10 mg Oral HS PRN     Labs: Results for Gaviota Bañuelos (MRN [id number]) as of 12/15/2018 09:46   Ref   Range 12/14/2018 08:13 12/14/2018 11:02 12/14/2018 16:05 12/14/2018 20:58 12/15/2018 08:03   POC Glucose Latest Ref Range: 65 - 140 mg/dl 115 145 (H) 122 121 103

## 2018-12-15 NOTE — PROGRESS NOTES
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

## 2018-12-15 NOTE — PROGRESS NOTES
Pt calm and cooperative, pleasant during interaction  Pt reports improving symptoms of depression and anxiety  Denies SI  Continues to use IS per orders  Compliant with meds  Denies any needs at this time  Visible in milieu throughout shift  Currently resting in bed

## 2018-12-16 LAB
GLUCOSE SERPL-MCNC: 112 MG/DL (ref 65–140)
GLUCOSE SERPL-MCNC: 117 MG/DL (ref 65–140)
GLUCOSE SERPL-MCNC: 137 MG/DL (ref 65–140)
GLUCOSE SERPL-MCNC: 140 MG/DL (ref 65–140)

## 2018-12-16 PROCEDURE — 99231 SBSQ HOSP IP/OBS SF/LOW 25: CPT | Performed by: PSYCHIATRY & NEUROLOGY

## 2018-12-16 PROCEDURE — 82948 REAGENT STRIP/BLOOD GLUCOSE: CPT

## 2018-12-16 RX ADMIN — SERTRALINE HYDROCHLORIDE 100 MG: 100 TABLET ORAL at 09:26

## 2018-12-16 RX ADMIN — LISINOPRIL 20 MG: 20 TABLET ORAL at 17:34

## 2018-12-16 RX ADMIN — METFORMIN HYDROCHLORIDE 500 MG: 500 TABLET ORAL at 16:46

## 2018-12-16 RX ADMIN — LORAZEPAM 1 MG: 1 TABLET ORAL at 17:34

## 2018-12-16 RX ADMIN — TRAMADOL HYDROCHLORIDE 50 MG: 50 TABLET, COATED ORAL at 16:48

## 2018-12-16 RX ADMIN — FLUTICASONE FUROATE AND VILANTEROL TRIFENATATE 1 PUFF: 100; 25 POWDER RESPIRATORY (INHALATION) at 07:37

## 2018-12-16 RX ADMIN — LISINOPRIL 20 MG: 20 TABLET ORAL at 09:26

## 2018-12-16 RX ADMIN — ZOLPIDEM TARTRATE 10 MG: 5 TABLET, COATED ORAL at 21:50

## 2018-12-16 RX ADMIN — HYDROCHLOROTHIAZIDE 25 MG: 25 TABLET ORAL at 17:35

## 2018-12-16 RX ADMIN — MONTELUKAST SODIUM 10 MG: 10 TABLET, FILM COATED ORAL at 21:49

## 2018-12-16 RX ADMIN — PANTOPRAZOLE SODIUM 20 MG: 20 TABLET, DELAYED RELEASE ORAL at 05:41

## 2018-12-16 RX ADMIN — METFORMIN HYDROCHLORIDE 500 MG: 500 TABLET ORAL at 09:26

## 2018-12-16 RX ADMIN — HYDROCHLOROTHIAZIDE 25 MG: 25 TABLET ORAL at 09:26

## 2018-12-16 RX ADMIN — LEVOTHYROXINE SODIUM 25 MCG: 25 TABLET ORAL at 05:41

## 2018-12-16 RX ADMIN — CARVEDILOL 6.25 MG: 3.12 TABLET, FILM COATED ORAL at 07:39

## 2018-12-16 RX ADMIN — ARIPIPRAZOLE 5 MG: 5 TABLET ORAL at 09:26

## 2018-12-16 RX ADMIN — LORAZEPAM 1 MG: 1 TABLET ORAL at 09:27

## 2018-12-16 RX ADMIN — CARVEDILOL 6.25 MG: 3.12 TABLET, FILM COATED ORAL at 16:46

## 2018-12-16 NOTE — PROGRESS NOTES
Pt had a positive visit with mother this evening  Attending groups and social with peers throughout evening  Pleasant and cooperative in conversation  Writer spoke to pt about inhalers prescribed, pt expressed understanding of the difference between maintenance vs rescue inhalers  Pt continues to use IS per orders  Denies SI  Appears with improved mood

## 2018-12-16 NOTE — PROGRESS NOTES
Requested tramadol due to chest pain 6/10, reported partially effective as pain decreased to 4/10  Pt denied further comfort measures

## 2018-12-16 NOTE — PROGRESS NOTES
Progress Note - Stephania 109 29 y o  male MRN: [id number]  Unit/Bed#: [facility] 258-01 Encounter: [id number]    Assessment/Plan   Principal Problem:    Bipolar affective disorder, currently depressed, moderate ([address] )  Active Problems:    Essential hypertension    Sinus tachycardia    Generalized anxiety disorder    Cannabis abuse    Chest pain on breathing    Morbid obesity due to excess calories (HCC)    Type 2 diabetes mellitus without complication, without long-term current use of insulin (HCC)    Acquired hypothyroidism      Behavior over the last 24 hours:  improved  Sleep: normal  Appetite: normal  Medication side effects: No  ROS: no complaints    Mental Status Evaluation:  Appearance:  age appropriate   Behavior:  normal   Speech:  normal pitch and normal volume   Mood:  normal   Affect:  normal   Thought Process:  normal   Thought Content:  normal   Perceptual Disturbances: None   Risk Potential: thony for safety in unit milieu   Sensorium:  person, place and time/date   Cognition:  grossly intact   Consciousness:  alert    Attention: attention span and concentration were age appropriate   Insight:  age appropriate   Judgment: age appropriate   Gait/Station: normal gait/station   Motor Activity: no abnormal movements     Progress Toward Goals: patient reporting improvement over the weekend  He thinks the spikes in blood suigars are coming from too much sugar in his coffee so he is cutting that out to see if that will make the blood sugars better     Recommended Treatment: Continue with group therapy, milieu therapy and occupational therapy  Risks, benefits and possible side effects of Medications:   Risks, benefits, and possible side effects of medications explained to patient and patient verbalizes understanding        Medications:   all current active meds have been reviewed and current meds:   Current Facility-Administered Medications   Medication Dose Route Frequency    acetaminophen (TYLENOL) tablet 650 mg  650 mg Oral Q6H PRN    albuterol (PROVENTIL HFA,VENTOLIN HFA) inhaler 2 puff  2 puff Inhalation Q4H PRN    aluminum-magnesium hydroxide-simethicone (MYLANTA) 200-200-20 mg/5 mL oral suspension 30 mL  30 mL Oral Q4H PRN    ARIPiprazole (ABILIFY) tablet 5 mg  5 mg Oral Daily    benztropine (COGENTIN) injection 1 mg  1 mg Intramuscular Q6H PRN    benztropine (COGENTIN) tablet 1 mg  1 mg Oral Q6H PRN    carvedilol (COREG) tablet 6 25 mg  6 25 mg Oral BID With Meals    fluticasone-vilanterol (BREO ELLIPTA) 100-25 mcg/inh inhaler 1 puff  1 puff Inhalation Daily    haloperidol (HALDOL) tablet 5 mg  5 mg Oral Q6H PRN    haloperidol lactate (HALDOL) injection 5 mg  5 mg Intramuscular Q6H PRN    hydrochlorothiazide (HYDRODIURIL) tablet 25 mg  25 mg Oral BID    hydrOXYzine HCL (ATARAX) tablet 50 mg  50 mg Oral Q6H PRN    insulin lispro (HumaLOG) 100 units/mL subcutaneous injection 2-12 Units  2-12 Units Subcutaneous TID AC    ipratropium (ATROVENT) 0 02 % inhalation solution 0 5 mg  0 5 mg Nebulization Q8H PRN    levalbuterol (XOPENEX) inhalation solution 1 25 mg  1 25 mg Nebulization Q8H PRN    levothyroxine tablet 25 mcg  25 mcg Oral Early Morning    lidocaine (LIDODERM) 5 % patch 1 patch  1 patch Topical Daily    lisinopril (ZESTRIL) tablet 20 mg  20 mg Oral BID    LORazepam (ATIVAN) 2 mg/mL injection 2 mg  2 mg Intramuscular Q6H PRN    LORazepam (ATIVAN) tablet 1 mg  1 mg Oral Q6H PRN    LORazepam (ATIVAN) tablet 1 mg  1 mg Oral BID    magnesium hydroxide (MILK OF MAGNESIA) 400 mg/5 mL oral suspension 30 mL  30 mL Oral Daily PRN    metFORMIN (GLUCOPHAGE) tablet 500 mg  500 mg Oral BID With Meals    montelukast (SINGULAIR) tablet 10 mg  10 mg Oral HS    nicotine (NICODERM CQ) 14 mg/24hr TD 24 hr patch 1 patch  1 patch Transdermal Daily    nicotine polacrilex (NICORETTE) gum 2 mg  2 mg Oral Q2H PRN    OLANZapine (ZyPREXA) IM injection 10 mg  10 mg Intramuscular Q8H PRN    OLANZapine (ZyPREXA) tablet 10 mg  10 mg Oral Q8H PRN    pantoprazole (PROTONIX) EC tablet 20 mg  20 mg Oral Early Morning    risperiDONE (RisperDAL M-TABS) dispersible tablet 1 mg  1 mg Oral Q3H PRN    sertraline (ZOLOFT) tablet 100 mg  100 mg Oral Daily    traMADol (ULTRAM) tablet 50 mg  50 mg Oral Q6H PRN    zolpidem (AMBIEN) tablet 10 mg  10 mg Oral HS PRN     Labs: Results for Jer Clarke (MRN [id number]) as of 12/16/2018 09:20   Ref   Range 12/15/2018 08:03 12/15/2018 11:28 12/15/2018 16:00 12/15/2018 20:49 12/16/2018 07:56   POC Glucose Latest Ref Range: 65 - 140 mg/dl 103 156 (H) 112 159 (H) 117

## 2018-12-16 NOTE — PROGRESS NOTES
Med Note: Pt stated he had 6 paks sugar with his coffee  His 11:30 AM blood sugar was 140  Will continue to monitor

## 2018-12-17 LAB
GLUCOSE SERPL-MCNC: 101 MG/DL (ref 65–140)
GLUCOSE SERPL-MCNC: 124 MG/DL (ref 65–140)
GLUCOSE SERPL-MCNC: 148 MG/DL (ref 65–140)
GLUCOSE SERPL-MCNC: 95 MG/DL (ref 65–140)

## 2018-12-17 PROCEDURE — 82948 REAGENT STRIP/BLOOD GLUCOSE: CPT

## 2018-12-17 PROCEDURE — 99232 SBSQ HOSP IP/OBS MODERATE 35: CPT | Performed by: PSYCHIATRY & NEUROLOGY

## 2018-12-17 RX ADMIN — HYDROCHLOROTHIAZIDE 25 MG: 25 TABLET ORAL at 18:01

## 2018-12-17 RX ADMIN — METFORMIN HYDROCHLORIDE 500 MG: 500 TABLET ORAL at 16:14

## 2018-12-17 RX ADMIN — METFORMIN HYDROCHLORIDE 500 MG: 500 TABLET ORAL at 08:31

## 2018-12-17 RX ADMIN — HYDROCHLOROTHIAZIDE 25 MG: 25 TABLET ORAL at 08:31

## 2018-12-17 RX ADMIN — LISINOPRIL 20 MG: 20 TABLET ORAL at 08:31

## 2018-12-17 RX ADMIN — LISINOPRIL 20 MG: 20 TABLET ORAL at 18:01

## 2018-12-17 RX ADMIN — CARVEDILOL 6.25 MG: 3.12 TABLET, FILM COATED ORAL at 08:31

## 2018-12-17 RX ADMIN — CARVEDILOL 6.25 MG: 3.12 TABLET, FILM COATED ORAL at 16:14

## 2018-12-17 RX ADMIN — LORAZEPAM 1 MG: 1 TABLET ORAL at 18:02

## 2018-12-17 RX ADMIN — ARIPIPRAZOLE 5 MG: 5 TABLET ORAL at 08:31

## 2018-12-17 RX ADMIN — TRAMADOL HYDROCHLORIDE 50 MG: 50 TABLET, COATED ORAL at 16:16

## 2018-12-17 RX ADMIN — MONTELUKAST SODIUM 10 MG: 10 TABLET, FILM COATED ORAL at 21:20

## 2018-12-17 RX ADMIN — FLUTICASONE FUROATE AND VILANTEROL TRIFENATATE 1 PUFF: 100; 25 POWDER RESPIRATORY (INHALATION) at 08:33

## 2018-12-17 RX ADMIN — LORAZEPAM 1 MG: 1 TABLET ORAL at 08:31

## 2018-12-17 RX ADMIN — PANTOPRAZOLE SODIUM 20 MG: 20 TABLET, DELAYED RELEASE ORAL at 06:00

## 2018-12-17 RX ADMIN — SERTRALINE HYDROCHLORIDE 100 MG: 100 TABLET ORAL at 08:31

## 2018-12-17 RX ADMIN — ZOLPIDEM TARTRATE 10 MG: 5 TABLET, COATED ORAL at 21:21

## 2018-12-17 RX ADMIN — LEVOTHYROXINE SODIUM 25 MCG: 25 TABLET ORAL at 05:59

## 2018-12-17 NOTE — PROGRESS NOTES
Pt visible in milieu, social with peers  Reports "woke up happy today" and feels improvement of mood  Reports still having chest tenderness but it is slowly improving  Pt offers no further complaints  Will continue to monitor and support

## 2018-12-17 NOTE — PLAN OF CARE
Problem: DISCHARGE PLANNING  Goal: Discharge to home or other facility with appropriate resources  INTERVENTIONS:  - Identify barriers to discharge w/patient and caregiver  - Arrange for needed discharge resources and transportation as appropriate  - Identify discharge learning needs (meds, wound care, etc )  - Arrange for interpretive services to assist at discharge as needed  - Refer to Case Management Department for coordinating discharge planning if the patient needs post-hospital services based on physician/advanced practitioner order or complex needs related to functional status, cognitive ability, or social support system   Outcome: Progressing  Pt is agreeable with discharge plan for tomorrow  He has an intake for outpatient mental health on Thurs and a new PCP appt for Friday

## 2018-12-17 NOTE — DISCHARGE INSTR - OTHER ORDERS
Included in discharge folder is a listing of NA meetings within 5 miles of your home zip [place]([id number])    The [address] is a toll-free telephone number for people in [facility] who are seeking a listening ear for additional support in their recovery from mental illness  The PEER LINE is peer-run and peer-friendly  You can call the Peer Line 24 hours a day  Phone: 4-879-OJ-PEERS /(7-910.992.1209)     Emergency [address] [facility] Emergency Services: [phone]  [address]  [address] [facility], [address] [place] [address]     Text CONNECT to 323220 from anywhere in the [place], anytime, about any type of crisis  A live, trained Crisis Counselor receives the text and lets you know that they are here to listen  The volunteer Crisis Counselor will help you move from a hot moment to a cool moment  Warm Line: [phone], [phone], [phone]  If it is not quite a crisis, but you want to talk to someone, 24 hours/day, 7 days/week:  Someone to listen; someone who cares  The [facility] ([facility]) offers various education & support groups for you & your family  For more information visit their website at http://www [employer]/     Dial 2-1-1 to get connected/get help  Free, confidential information & referral available 24/7: Aging Services, Child & Youth Services, Counseling, Education/Training, Food/Shelter/Clothing, Health Services, Parenting, Substance Abuse, Support Groups, Volunteer Opportunities, & much more    Phone: 2-1-1 or [phone], Web: KATHRINE [email or web address] VERN, Email: [email or web address]

## 2018-12-17 NOTE — PROGRESS NOTES
Pleasant and cooperative during interaction  Disappointed he was not being discharged today  Hopeful for discharge and verbalized mild anxiety r/t legal issues and is interested in addressing his legal issues in a timely manner  Denies pain  Denies issues with sleep  Reports improved mood and mentioned usually on rainy days he lays around in bed but over the weekend he was OOB and social with peers and attending groups  Refused any additional information on diabetes at this time

## 2018-12-17 NOTE — PROGRESS NOTES
Progress Note - Stephania [age] y o  male MRN: [id number]  Unit/Bed#: [facility] 258-01 Encounter: [id number]    Assessment/Plan   Principal Problem:    Bipolar affective disorder, currently depressed, moderate ([facility] )  Active Problems:    Essential hypertension    Sinus tachycardia    Generalized anxiety disorder    Cannabis abuse    Chest pain on breathing    Morbid obesity due to excess calories ([facility] )    Type 2 diabetes mellitus without complication, without long-term current use of insulin ([facility])    Acquired hypothyroidism    Subjective:  Patient is compliant with medications with no acute side effects  Patient reports slow improvement in mood and anxiety and denies endorsing suicidal or homicidal ideations today  Patient reports collaborating with his family and low in regarding active legal charges  Patient is requesting to have discharge planning for tomorrow so that he can work with his family and  on these charges  Patient does appear future oriented and is handling this news well  He is seen out in milieu today attending groups and is consenting for safety on the unit      Current Medications:    Current Facility-Administered Medications:  acetaminophen 650 mg Oral Q6H PRN [facility], PA-C   albuterol 2 puff Inhalation Q4H PRN Nelson Becker MD   aluminum-magnesium hydroxide-simethicone 30 mL Oral Q4H PRN [facility], PA-C   ARIPiprazole 5 mg Oral Daily Bello Chase   benztropine 1 mg Intramuscular Q6H PRN [facility], PA-C   benztropine 1 mg Oral Q6H PRN [facility], PA-C   carvedilol 6 25 mg Oral BID With Meals Lluvia Montero MD   fluticasone-vilanterol 1 puff Inhalation Daily Nelson Becker MD   haloperidol 5 mg Oral Q6H PRN [facility], PA-C   haloperidol lactate 5 mg Intramuscular Q6H PRN [facility], PA-C   hydrochlorothiazide 25 mg Oral BID Nelson Becker MD   hydrOXYzine HCL 50 mg Oral Q6H PRN [facility], PA-C   insulin lispro 2-12 Units Subcutaneous TID AC Joey Tan MD   ipratropium 0 5 mg Nebulization Q8H PRN Bello Chase   levalbuterol 1 25 mg Nebulization Q8H PRN Bello Chase   levothyroxine 25 mcg Oral Early Morning Joey Tan MD   lidocaine 1 patch Topical Daily Sravani Velasco MD   lisinopril 20 mg Oral BID Sravani Velasco MD   LORazepam 2 mg Intramuscular Q6H PRN Zerita Apo, PA-C   LORazepam 1 mg Oral Q6H PRN Zerita Apo, PA-C   LORazepam 1 mg Oral BID Bello Chase   magnesium hydroxide 30 mL Oral Daily PRN Zerita Apo, PA-C   metFORMIN 500 mg Oral BID With Meals Joey Tan MD   montelukast 10 mg Oral HS Sravani Velasco MD   nicotine 1 patch Transdermal Daily Sravani Velasco MD   nicotine polacrilex 2 mg Oral Q2H PRN Zerita Apo, PA-C   OLANZapine 10 mg Intramuscular Q8H PRN Zerita Apo, PA-C   OLANZapine 10 mg Oral Q8H PRN Zerita Apo, PA-C   pantoprazole 20 mg Oral Early Morning Sravani Velasco MD   risperiDONE 1 mg Oral Q3H PRN Zerita Apo, PA-C   sertraline 100 mg Oral Daily Sravani Velasco MD   traMADol 50 mg Oral Q6H PRN Joey Tan MD   zolpidem 10 mg Oral HS PRN Zerita Apo, PA-C       Behavioral Health Medications: all current active meds have been reviewed  Vital signs in last 24 hours:  Temp:  [98 4 °F (36 9 °C)-98 7 °F (37 1 °C)] 98 4 °F (36 9 °C)  HR:  [104-114] 104  Resp:  [18] 18  BP: (136-149)/(65-75) 136/65    Laboratory results:    I have personally reviewed all pertinent laboratory/tests results  Labs in last 72 hours: No results for input(s): WBC, RBC, HGB, HCT, PLT, RDW, NEUTROABS, SODIUM, K, CL, CO2, BUN, CREATININE, GLUC, GLUF, CALCIUM, AST, ALT, ALKPHOS, TP, ALB, TBILI, BILIDIR, CHOLESTEROL, HDL, TRIG, LDLCALC, VALPROICTOT, CARBAMAZEPIN, LITHIUM, AMMONIA, BTD6EATKSOTY, FREET4, T3FREE, PREGTESTUR, PREGSERUM, HCG, HCGQUANT, RPR in the last 72 hours      Invalid input(s): RBC  Admission Labs:   Admission on 12/11/2018   Component Date Value    Sodium 12/12/2018 140     Potassium 12/12/2018 3 6     Chloride 12/12/2018 100     CO2 12/12/2018 31     ANION GAP 12/12/2018 9     BUN 12/12/2018 10     Creatinine 12/12/2018 0 81     Glucose 12/12/2018 101     Glucose, Fasting 12/12/2018 101*    Calcium 12/12/2018 8 8     AST 12/12/2018 54*    ALT 12/12/2018 143*    Alkaline Phosphatase 12/12/2018 110     Total Protein 12/12/2018 7 1     Albumin 12/12/2018 3 6     Total Bilirubin 12/12/2018 0 30     eGFR 12/12/2018 121     Cholesterol 12/12/2018 152     Triglycerides 12/12/2018 131     HDL, Direct 12/12/2018 43     LDL Calculated 12/12/2018 83     Non-HDL-Chol (CHOL-HDL) 12/12/2018 109     RPR 12/12/2018 Non-Reactive     Hemoglobin A1C 12/12/2018 8 1*    EAG 12/12/2018 186     D-Dimer, Quant 12/13/2018 <270     Troponin I 12/13/2018 <0 02     CRP 12/13/2018 22 0*    Sed Rate 12/13/2018 15*    POC Glucose 12/13/2018 199*    Troponin I 12/13/2018 <0 02     Ventricular Rate 12/13/2018 120     Atrial Rate 12/13/2018 120     KS Interval 12/13/2018 122     QRSD Interval 12/13/2018 98     QT Interval 12/13/2018 342     QTC Interval 12/13/2018 483     P Axis 12/13/2018 47     QRS Axis 12/13/2018 13     T Wave Axis 12/13/2018 -17     POC Glucose 12/13/2018 142*    POC Glucose 12/14/2018 115     POC Glucose 12/14/2018 145*    POC Glucose 12/14/2018 122     POC Glucose 12/14/2018 121     POC Glucose 12/15/2018 103     POC Glucose 12/15/2018 156*    POC Glucose 12/15/2018 112     POC Glucose 12/15/2018 159*    POC Glucose 12/16/2018 117     POC Glucose 12/16/2018 140     POC Glucose 12/16/2018 137     POC Glucose 12/16/2018 112     POC Glucose 12/17/2018 124     POC Glucose 12/17/2018 148*       Psychiatric Review of Systems:  Behavior over the last 24 hours:  improved  Sleep: normal  Appetite: normal  Medication side effects: No  ROS: no complaints    Mental Status Evaluation:  Appearance:  casually dressed   Behavior:  guarded   Speech:  normal volume   Mood:  anxious   Affect:  constricted   Language naming objects   Thought Process:  circumstantial   Thought Content:  obsessions   Perceptual Disturbances: None   Risk Potential: Suicidal Ideations none, Homicidal Ideations none and Potential for Aggression No   Sensorium:  person and place   Cognition:  grossly intact   Consciousness:  awake    Attention: attention span appeared shorter than expected for age   Insight:  limited   Judgment: limited   Intellect fair   Gait/Station: normal gait/station   Motor Activity: no abnormal movements     Memory: Short and long term memory  fair     Progress Toward Goals: progressing    Recommended Treatment:   Initiate disposition planning  Continue with group therapy, milieu therapy and occupational therapy      Continue following current medications:   Current Facility-Administered Medications:  acetaminophen 650 mg Oral Q6H PRN Marta Patrick PA-C   albuterol 2 puff Inhalation Q4H PRN Verona Davis MD   aluminum-magnesium hydroxide-simethicone 30 mL Oral Q4H PRN Marta Patrick PA-C   ARIPiprazole 5 mg Oral Daily Bello Chase   benztropine 1 mg Intramuscular Q6H PRN Marta Patrick PA-C   benztropine 1 mg Oral Q6H PRN Marta Patrick PA-C   carvedilol 6 25 mg Oral BID With Meals Nelson Spangler MD   fluticasone-vilanterol 1 puff Inhalation Daily Verona Davis MD   haloperidol 5 mg Oral Q6H PRN Marta Patrick PA-C   haloperidol lactate 5 mg Intramuscular Q6H PRN Marta Patrick PA-C   hydrochlorothiazide 25 mg Oral BID Verona Davis MD   hydrOXYzine HCL 50 mg Oral Q6H PRN Marta Patrick PA-C   insulin lispro 2-12 Units Subcutaneous TID AC Nelson Spangler MD   ipratropium 0 5 mg Nebulization Q8H PRN Bello Chase   levalbuterol 1 25 mg Nebulization Q8H PRN Bello Chase   levothyroxine 25 mcg Oral Early Morning Sylvester Mejía MD   lidocaine 1 patch Topical Daily Sakshi Guerrier MD   lisinopril 20 mg Oral BID Sakshi Guerrier MD   LORazepam 2 mg Intramuscular Q6H PRN Zohra Johns, PA-C   LORazepam 1 mg Oral Q6H PRN Zohra Johns, PA-C   LORazepam 1 mg Oral BID Bello Chase   magnesium hydroxide 30 mL Oral Daily PRN Zohra Johns, PA-C   metFORMIN 500 mg Oral BID With Meals Sylvester Mejía MD   montelukast 10 mg Oral HS Cantor MD Fareed   nicotine 1 patch Transdermal Daily Sakshi Guerrier MD   nicotine polacrilex 2 mg Oral Q2H PRN Zohra Johns, PA-C   OLANZapine 10 mg Intramuscular Q8H PRN Zohra Johns, PA-C   OLANZapine 10 mg Oral Q8H PRN Zohra Johns, PA-C   pantoprazole 20 mg Oral Early Morning Sakshi Guerrier MD   risperiDONE 1 mg Oral Q3H PRN Zohra Johns, PA-C   sertraline 100 mg Oral Daily Sakshi Guerrier MD   traMADol 50 mg Oral Q6H PRN Sylvester Mejía MD   zolpidem 10 mg Oral HS PRN Zohra Johns, PA-C       Risks, benefits and possible side effects of Medications:   Risks, benefits, and possible side effects of medications explained to patient and patient verbalizes understanding  This note has been constructed using a voice recognition system  There may be translation, syntax,  or grammatical errors  If you have any questions, please contact the dictating provider

## 2018-12-17 NOTE — CASE MANAGEMENT
CW met with Pt to review discharge plans for tomorrow  Pt said that his mother or girlfriend will provide transportation around 10 AM   CW asked if Pt is open to referrals besides [facility] as they have not gotten back to CW yet; Pt agreeable  Pt said that he spoke with his  & he will be going with him after he discharges to address the warrant; Pt said he was instructed not to call & not to speak about it without his  present  Pt said that he plans to return to work on Wednesday, & CW agreed he would be provided a return to work note  CW contacted [facility] @ [phone] & spoke with Lisa Parish, about scheduling Pt a sooner appointment or putting him on a cancellation list, as he was recently diagnosed with DBM  She was able to schedule Pt an appointment for 12/21 @ 3:20 PM with  Orestes Redd PA-C  Pt will still see Dr Jose Alberto Fernando on 1/22 as well  CW contacted [facility] @ [phone] & was able to schedule Pt an intake for Thursday at 12:00 PM with Marisol

## 2018-12-18 VITALS
TEMPERATURE: 98.6 F | SYSTOLIC BLOOD PRESSURE: 112 MMHG | OXYGEN SATURATION: 94 % | HEIGHT: 71 IN | RESPIRATION RATE: 18 BRPM | HEART RATE: 100 BPM | DIASTOLIC BLOOD PRESSURE: 53 MMHG | BODY MASS INDEX: 43.82 KG/M2 | WEIGHT: 313 LBS

## 2018-12-18 PROBLEM — F19.10 POLYSUBSTANCE ABUSE (HCC): Status: ACTIVE | Noted: 2018-12-18

## 2018-12-18 LAB — GLUCOSE SERPL-MCNC: 131 MG/DL (ref 65–140)

## 2018-12-18 PROCEDURE — 99239 HOSP IP/OBS DSCHRG MGMT >30: CPT | Performed by: PSYCHIATRY & NEUROLOGY

## 2018-12-18 PROCEDURE — 82948 REAGENT STRIP/BLOOD GLUCOSE: CPT

## 2018-12-18 RX ORDER — LEVOTHYROXINE SODIUM 0.03 MG/1
25 TABLET ORAL
Qty: 30 TABLET | Refills: 0 | Status: ON HOLD | OUTPATIENT
Start: 2018-12-19 | End: 2019-10-03 | Stop reason: SDUPTHER

## 2018-12-18 RX ORDER — MONTELUKAST SODIUM 10 MG/1
10 TABLET ORAL
Qty: 30 TABLET | Refills: 0 | Status: SHIPPED | OUTPATIENT
Start: 2018-12-18 | End: 2019-10-03 | Stop reason: HOSPADM

## 2018-12-18 RX ORDER — CARVEDILOL 6.25 MG/1
6.25 TABLET ORAL 2 TIMES DAILY WITH MEALS
Qty: 60 TABLET | Refills: 0 | Status: SHIPPED | OUTPATIENT
Start: 2018-12-18 | End: 2019-10-03 | Stop reason: HOSPADM

## 2018-12-18 RX ORDER — HYDROCHLOROTHIAZIDE 25 MG/1
25 TABLET ORAL 2 TIMES DAILY
Qty: 60 TABLET | Refills: 0 | Status: SHIPPED | OUTPATIENT
Start: 2018-12-18 | End: 2019-10-03 | Stop reason: HOSPADM

## 2018-12-18 RX ORDER — ALBUTEROL SULFATE 90 UG/1
2 AEROSOL, METERED RESPIRATORY (INHALATION) EVERY 6 HOURS PRN
Qty: 1 INHALER | Refills: 0 | Status: SHIPPED | OUTPATIENT
Start: 2018-12-18 | End: 2019-10-03 | Stop reason: HOSPADM

## 2018-12-18 RX ORDER — LISINOPRIL 20 MG/1
20 TABLET ORAL 2 TIMES DAILY
Qty: 60 TABLET | Refills: 0 | Status: SHIPPED | OUTPATIENT
Start: 2018-12-18 | End: 2019-10-03 | Stop reason: HOSPADM

## 2018-12-18 RX ORDER — ZOLPIDEM TARTRATE 10 MG/1
10 TABLET ORAL
Qty: 30 TABLET | Refills: 1 | Status: SHIPPED | OUTPATIENT
Start: 2018-12-18 | End: 2019-10-03 | Stop reason: HOSPADM

## 2018-12-18 RX ORDER — ARIPIPRAZOLE 5 MG/1
5 TABLET ORAL DAILY
Qty: 30 TABLET | Refills: 1 | Status: SHIPPED | OUTPATIENT
Start: 2018-12-19 | End: 2019-10-03 | Stop reason: HOSPADM

## 2018-12-18 RX ORDER — OMEPRAZOLE 20 MG/1
20 CAPSULE, DELAYED RELEASE ORAL DAILY
Qty: 30 CAPSULE | Refills: 0 | Status: SHIPPED | OUTPATIENT
Start: 2018-12-18 | End: 2019-10-03 | Stop reason: HOSPADM

## 2018-12-18 RX ORDER — LORAZEPAM 1 MG/1
1 TABLET ORAL 2 TIMES DAILY
Qty: 30 TABLET | Refills: 3 | Status: SHIPPED | OUTPATIENT
Start: 2018-12-18 | End: 2019-10-03 | Stop reason: HOSPADM

## 2018-12-18 RX ORDER — CARVEDILOL 6.25 MG/1
6.25 TABLET ORAL 2 TIMES DAILY WITH MEALS
Qty: 60 TABLET | Refills: 1 | Status: SHIPPED | OUTPATIENT
Start: 2018-12-18 | End: 2018-12-18

## 2018-12-18 RX ORDER — SERTRALINE HYDROCHLORIDE 100 MG/1
100 TABLET, FILM COATED ORAL DAILY
Qty: 30 TABLET | Refills: 1 | Status: SHIPPED | OUTPATIENT
Start: 2018-12-19 | End: 2019-10-03 | Stop reason: HOSPADM

## 2018-12-18 RX ADMIN — METFORMIN HYDROCHLORIDE 500 MG: 500 TABLET ORAL at 08:39

## 2018-12-18 RX ADMIN — LISINOPRIL 20 MG: 20 TABLET ORAL at 08:38

## 2018-12-18 RX ADMIN — LEVOTHYROXINE SODIUM 25 MCG: 25 TABLET ORAL at 06:35

## 2018-12-18 RX ADMIN — PANTOPRAZOLE SODIUM 20 MG: 20 TABLET, DELAYED RELEASE ORAL at 06:35

## 2018-12-18 RX ADMIN — LORAZEPAM 1 MG: 1 TABLET ORAL at 08:38

## 2018-12-18 RX ADMIN — CARVEDILOL 3.12 MG: 3.12 TABLET, FILM COATED ORAL at 08:39

## 2018-12-18 RX ADMIN — SERTRALINE HYDROCHLORIDE 100 MG: 100 TABLET ORAL at 08:40

## 2018-12-18 RX ADMIN — FLUTICASONE FUROATE AND VILANTEROL TRIFENATATE 1 PUFF: 100; 25 POWDER RESPIRATORY (INHALATION) at 08:42

## 2018-12-18 RX ADMIN — ARIPIPRAZOLE 5 MG: 5 TABLET ORAL at 08:39

## 2018-12-18 RX ADMIN — HYDROCHLOROTHIAZIDE 25 MG: 25 TABLET ORAL at 08:40

## 2018-12-18 NOTE — CASE MANAGEMENT
CW met with Pt, who verbalized readiness for discharge  CW reviewed with him his appointment on Thursday at [facility], & with his new PCP on Friday  Pt had no questions/concerns about his discharge

## 2018-12-18 NOTE — DISCHARGE INSTR - LAB
Contact Information: If you have any questions, concerns, pended studies, tests and/or procedures, or emergencies regarding your inpatient behavioral health visit  Please contact [place] [facility] [phone] and ask to speak to a , nurse or physician  A contact is available 24 hours/ 7 days a week at this number  Summary of Procedures Performed During your Stay:  Below is a list of major procedures performed during your hospital stay and a summary of results:  - No major procedures performed  Pending Studies     None        If studies are pending at discharge, follow up with your PCP and/or referring provider

## 2018-12-18 NOTE — DISCHARGE INSTRUCTIONS
Bipolar Disorder   WHAT YOU NEED TO KNOW:   Bipolar disorder is a long-term chemical imbalance that causes rapid changes in mood and behavior  High moods are called sabra  Low moods are called depression  Sometimes you will feel manic and sometimes you will feel depressed  You can have alternating episodes of sabra and depression  This is called a mixed bipolar state  DISCHARGE INSTRUCTIONS:   Call 911 if:   · You think about hurting yourself or someone else  Contact your healthcare provider or psychiatrist if:   · You are having trouble managing your bipolar disorder  · You cannot sleep, or are sleeping all the time  · You cannot eat, or are eating more than usual     · You feel dizzy or your stomach is upset  · You cannot make it to your next meeting  · You have questions or concerns about your condition or care  Medicines:   · Medicines  may be given to help keep your mood stable, or to help you sleep  Changes in medicine are often needed as your bipolar disorder changes  · Take your medicine as directed  Contact your healthcare provider if you think your medicine is not helping or if you have side effects  Tell him or her if you are allergic to any medicine  Keep a list of the medicines, vitamins, and herbs you take  Include the amounts, and when and why you take them  Bring the list or the pill bottles to follow-up visits  Carry your medicine list with you in case of an emergency  Follow up with your healthcare provider or psychiatrist as directed:  Write down your questions so you remember to ask them during your visits  Manage bipolar disorder:  Watch for triggers of bipolar disorder symptoms, such as stress  Learn new ways to relax, such as deep breathing, to manage your stress  Tell someone if you feel a manic or depressive period might be coming on  Ask a friend or family member to help watch you for bipolar symptoms   Work to develop skills that will help you manage bipolar disorder  You may need to make lifestyle changes  Ask your healthcare provider or psychiatrist for resources  For support and more information:   · [address] [facility]), [address], [address] [facility], [address]  [place] [address]   Phone: [phone]  Phone: [phone]  Web Address: Joselin tn  · Depression and [address] ([employer])  [address] [place] [address], [facility] , [address]  Phone: [phone]  Web Address: [employer] no  org   © 2017 [facility] Information is for End User's use only and may not be sold, redistributed or otherwise used for commercial purposes  All illustrations and images included in CareNotes® are the copyrighted property of A D A M [employer] Inc  or Sunil Mcconnell  The above information is an  only  It is not intended as medical advice for individual conditions or treatments  Talk to your doctor, nurse or pharmacist before following any medical regimen to see if it is safe and effective for you  Generalized Anxiety Disorder   WHAT YOU NEED TO KNOW:   Generalized anxiety disorder (YESSICA) is a condition that causes you to feel worried or nervous for at least 6 months  The anxiety may be much more severe than the event causing it  You may not be able to do your daily activities because of the anxiety  DISCHARGE INSTRUCTIONS:   Call 911 for any of the following:   · You feel like hurting yourself or someone else  · You have chest pain, tightness, or heaviness that may spread to your shoulders, arms, jaw, neck, or back  Seek care immediately if:   · You feel like fainting or are lightheaded or too dizzy to stand up  Contact your healthcare provider if:   · You have new symptoms since your last visit  · Your anxiety keeps you from doing your daily activities, such as self-care, family, or work      · You have problems that you think may be caused by the medicine you are taking  · Your symptoms are getting worse  · You have questions or concerns about your condition or care  Medicines:   · Medicines  may be given to relieve anxiety and depression and help you feel calm and relaxed  · Take your medicine as directed  Contact your healthcare provider if you think your medicine is not helping or if you have side effects  Tell him or her if you are allergic to any medicine  Keep a list of the medicines, vitamins, and herbs you take  Include the amounts, and when and why you take them  Bring the list or the pill bottles to follow-up visits  Carry your medicine list with you in case of an emergency  Follow up with your healthcare provider as directed:  Write down your questions so you remember to ask them during your visits  Monitor your condition:  Keep a record of the situations that cause your symptoms  Bring the record with you when you see your healthcare provider  Include the following:  · What were you doing when the symptoms started? · Had you eaten anything unusual, or taken a new medicine or herbal supplement? · Were you stressed or upset during the time leading up to the attack? · How often do you have symptoms? How long do they last?    · What were your thoughts and feelings during these situations? · What symptoms did you have? · Did anything help ease or stop the symptoms, such as a relaxation technique? Self-care:   · Limit or do not drink caffeine  Caffeine can increase your heartbeat and make your anxiety symptoms worse  · Limit or do not drink alcohol  Ask your healthcare provider if alcohol is safe for you  You may not be able to drink alcohol if you take certain anxiety or depression medicines  Alcohol can also increase depression  Limit alcohol to 1 drink per day if you are a woman  Limit alcohol to 2 drinks per day if you are a man   A drink of alcohol is 12 ounces of beer, 5 ounces of wine, or 1½ ounces of liquor  · Manage stress  Stress may increase symptoms of YESSICA  Relaxation therapy teaches you how to feel less physical and emotional stress  Deep breathing, muscle relaxation, and music are some forms of relaxation therapy  · Avoid hyperventilating  Some people may hyperventilate during an anxiety attack and not even notice it  Hyperventilation means that your breaths are too fast and shallow  Breathing this way can cause numbness or tingling in your hands and lips  During an anxiety attack, focus on taking very slow, deep breaths  Your healthcare provider may show you how to breathe in and out of a paper bag when you hyperventilate  Never use a plastic bag  © 2017 [address] is for End User's use only and may not be sold, redistributed or otherwise used for commercial purposes  All illustrations and images included in CareNotes® are the copyrighted property of A [employer] A M [employer] Inc  or Sunil Mcconnell  The above information is an  only  It is not intended as medical advice for individual conditions or treatments  Talk to your doctor, nurse or pharmacist before following any medical regimen to see if it is safe and effective for you

## 2018-12-18 NOTE — DISCHARGE SUMMARY
Discharge Summary - Stephania Agosto 29 y o  male MRN: [id number]  Unit/Bed#: -01 Encounter: [id number]     Admission Date:   Admission Orders     Ordered        12/11/18 2134  DISCHARGE READMIT Admit Patient to [address] [place] [address] (use with Discharge Readmit Navigator in [facility] Discharge Readmit scenario including from any IP unit or different campus ED to [facility])  Nurse to release order when pt  arrives to [facility] Unit  Once                   Discharge Date: 12/18/18    Attending Psychiatrist: Leydi Sood MD    Reason for Admission/HPI:   History of Present Illness   Patient is a 49-year-old male who presented to [facility] ED by EMS after intentional overdose of Xanax and cough medicine  According to ED note there was a straw with white powder found on his possession  Patient's girlfriend reported he took ecstasy, 10-12 tablets of Xanax 1mg, and 120mL bottle of cough medicine containing pseudoephedrine, dextromethophran, and brompheneramine  In ED patient was lethargic, had slurred speech, was not making sense, and was admitted to Internal Medicine unit  UDS was positive for amphetamines, benzodiazepines, cocaine, opiates, PCP, and THC  Patient denied most of these drugs and reported ecstacy being laced later in hospitalization  Psychiatric consult was performed and it was deemed appropriate for inpatient psychiatric admission  Precipitating events are family and relationship issues  [facility]  On initial psychiatric evaluation patient reported increased depression over the last few weeks with symptoms of poor sleep, lack of energy, anhedonia, inability to concentrate, lack of appetite, hopelessness, and suicidal thoughts  Patient did report manic symptoms of fluctuating mood and irritability  He did not meet criteria for sabra  He does have history of manic symptoms lasting 3-4 days    He denied psychotic symptoms  He also reported increased anxiety with panic attacks  He was being prescribed Xanax by outpatient provider for longtime  Patient does have 1 prior inpatient psychiatric hospitalization, does have prior suicide attempts, and is in treatment with his PCP  Psychosocial Stressors:  Health, relationship, family, drugs      Hospital Course:    Behavioral Health Medications:   current meds:   Current Facility-Administered Medications   Medication Dose Route Frequency    acetaminophen (TYLENOL) tablet 650 mg  650 mg Oral Q6H PRN    albuterol (PROVENTIL HFA,VENTOLIN HFA) inhaler 2 puff  2 puff Inhalation Q4H PRN    aluminum-magnesium hydroxide-simethicone (MYLANTA) 200-200-20 mg/5 mL oral suspension 30 mL  30 mL Oral Q4H PRN    ARIPiprazole (ABILIFY) tablet 5 mg  5 mg Oral Daily    benztropine (COGENTIN) injection 1 mg  1 mg Intramuscular Q6H PRN    benztropine (COGENTIN) tablet 1 mg  1 mg Oral Q6H PRN    carvedilol (COREG) tablet 6 25 mg  6 25 mg Oral BID With Meals    fluticasone-vilanterol (BREO ELLIPTA) 100-25 mcg/inh inhaler 1 puff  1 puff Inhalation Daily    haloperidol (HALDOL) tablet 5 mg  5 mg Oral Q6H PRN    haloperidol lactate (HALDOL) injection 5 mg  5 mg Intramuscular Q6H PRN    hydrochlorothiazide (HYDRODIURIL) tablet 25 mg  25 mg Oral BID    hydrOXYzine HCL (ATARAX) tablet 50 mg  50 mg Oral Q6H PRN    insulin lispro (HumaLOG) 100 units/mL subcutaneous injection 2-12 Units  2-12 Units Subcutaneous TID AC    ipratropium (ATROVENT) 0 02 % inhalation solution 0 5 mg  0 5 mg Nebulization Q8H PRN    levalbuterol (XOPENEX) inhalation solution 1 25 mg  1 25 mg Nebulization Q8H PRN    levothyroxine tablet 25 mcg  25 mcg Oral Early Morning    lidocaine (LIDODERM) 5 % patch 1 patch  1 patch Topical Daily    lisinopril (ZESTRIL) tablet 20 mg  20 mg Oral BID    LORazepam (ATIVAN) 2 mg/mL injection 2 mg  2 mg Intramuscular Q6H PRN    LORazepam (ATIVAN) tablet 1 mg  1 mg Oral Q6H PRN    LORazepam (ATIVAN) tablet 1 mg  1 mg Oral BID    magnesium hydroxide (MILK OF MAGNESIA) 400 mg/5 mL oral suspension 30 mL  30 mL Oral Daily PRN    metFORMIN (GLUCOPHAGE) tablet 500 mg  500 mg Oral BID With Meals    montelukast (SINGULAIR) tablet 10 mg  10 mg Oral HS    nicotine (NICODERM CQ) 14 mg/24hr TD 24 hr patch 1 patch  1 patch Transdermal Daily    nicotine polacrilex (NICORETTE) gum 2 mg  2 mg Oral Q2H PRN    OLANZapine (ZyPREXA) IM injection 10 mg  10 mg Intramuscular Q8H PRN    OLANZapine (ZyPREXA) tablet 10 mg  10 mg Oral Q8H PRN    pantoprazole (PROTONIX) EC tablet 20 mg  20 mg Oral Early Morning    risperiDONE (RisperDAL M-TABS) dispersible tablet 1 mg  1 mg Oral Q3H PRN    sertraline (ZOLOFT) tablet 100 mg  100 mg Oral Daily    traMADol (ULTRAM) tablet 50 mg  50 mg Oral Q6H PRN    zolpidem (AMBIEN) tablet 10 mg  10 mg Oral HS PRN       Patient was admitted to [address] [place] [address] inpatient psychiatric unit on voluntary 201 commitment for safety and stabilization  On admission patient was the continued on Zoloft 150mg QD for depression/anxiety management, started on Abilify 5mg QD for mood stabilization/depression management, increased on Ambien to 10mg HS for insomnia, and switched from Xanax to Ativan 1mg BID for anxiety  Zoloft was decreased to 100mg QD due to risk of activation of manic symptoms  Medical consult was performed where he was diagnosed with hypothyroidism and diabetes type 2  Patient was placed on Levothyroxine 25mcg QD and Metformin 500mg BID  He tolerated medications with no acute side effects  His mood brightened over the course of his treatment, and he was seen in [facility] interacting appropriately with peers  He did not demonstrate dangerous behavior to self or others during his inpatient stay    On day of discharge patient had reduced depression, controllable anxiety, denied psychosis, did not show signs of sabra, and denied suicidal/homicidal ideations  Mental Status at time of Discharge:     Appearance:  casually dressed   Behavior:  Cooperative   Speech:  soft   Mood:  euthymic   Affect:  mood-congruent   Thought Process:  normal   Thought Content:  normal  Denied delusions and obsessions   Perceptual Disturbances: None   Risk Potential: Denied SI/HI  Potential for aggression no   Sensorium:  person, place and time/date   Cognition:  grossly intact   Consciousness:  alert and awake    Attention: attention span and concentration were age appropriate   Insight:  fair   Judgment: fair   Gait/Station: normal gait/station and normal balance   Motor Activity: no abnormal movements       Discharge Diagnosis:   Bipolar affective disorder, currently depressed, moderate   Generalized anxiety disorder  Polysubstance abuse      Discharge Medications:  See after visit summary for reconciled discharge medications provided to patient and family  Discharge instructions/Information to patient and family:   See after visit summary for information provided to patient and family  Provisions for Follow-Up Care:  See after visit summary for information related to follow-up care and any pertinent home health orders  Discharge Statement   I spent 38 minutes discharging the patient  This time was spent on the day of discharge  I had direct contact with the patient on the day of discharge  On day of discharge patient had mental status exam performed, discharge instructions/medications reviewed, and outpatient planning discussed  He was given 1 month plus 1 refill of psychiatric scripts  He was given 1 month of medical scripts  He denied tobacco cessation therapy and rehab services  Patient was focused on leaving hospital to solve legal matter      Shannon Mcdaniels PA-C

## 2018-12-18 NOTE — PROGRESS NOTES
Friendly and cooperative with peers and staff  Expressed readiness for discharge  Denies SI/HI  Encouraged NA mtgs  Discussed healthy coping skills and verbalized plan to utilize support network and focus on his music which includes playing the guitar and piano  Encouraged compliance with medications and OP appointments  Suggested reaching out to [address] [place] [address] Diabetic education [place] for additional support and education re: new diagnosis of diabetes  No questions or concerns

## 2018-12-18 NOTE — PROGRESS NOTES
Spoke with pt in quiet room  Pt expressed readiness to discharge  He said he has to go back to work  Briefly discussed overdose  Said he really wanted to die and did  He had to be resuscitated  He said that this has been an epiphany for him  Pt said that he is now aware of Bipolar Diagnosis and better able to deal with it  Said medications are working and he feels much better  Also, diagnose with DM  We discussed blood testing at home and when to test   Pt acknowledged  We discussed meals and he said he is meeting with nutritionist tomorrow  Pt very positive about discharge and stay here    Denied SI

## 2019-01-04 ENCOUNTER — TELEPHONE (OUTPATIENT)
Dept: CASE MANAGEMENT | Facility: HOSPITAL | Age: 29
End: 2019-01-04

## 2019-01-30 ENCOUNTER — TELEPHONE (OUTPATIENT)
Dept: CASE MANAGEMENT | Facility: HOSPITAL | Age: 29
End: 2019-01-30

## 2019-01-30 NOTE — TELEPHONE ENCOUNTER
MAX returned a phone call to Pt, after having received a message 1/30 @ 2:24 PM   Pt stated on his message that he had not been able to schedule appointments due to family issues & he was almost out of medication  CM attempted to call Pt back, however, after several times ringing, the call dropped  CM attempted again with same result  Pt had been scheduled to see his PCP on 1/22 & had been advised on 1/4 to contact [facility] where he had been referred to at discharge, to reschedule his intake

## 2019-02-27 ENCOUNTER — TELEPHONE (OUTPATIENT)
Dept: BEHAVIORAL HEALTH UNIT | Facility: HOSPITAL | Age: 29
End: 2019-02-27

## 2019-02-27 ENCOUNTER — APPOINTMENT (EMERGENCY)
Dept: RADIOLOGY | Facility: HOSPITAL | Age: 29
End: 2019-02-27
Payer: MEDICARE

## 2019-02-27 ENCOUNTER — HOSPITAL ENCOUNTER (EMERGENCY)
Facility: HOSPITAL | Age: 29
Discharge: HOME | End: 2019-02-27
Attending: EMERGENCY MEDICINE | Admitting: EMERGENCY MEDICINE
Payer: MEDICARE

## 2019-02-27 VITALS
RESPIRATION RATE: 22 BRPM | HEART RATE: 112 BPM | OXYGEN SATURATION: 95 % | WEIGHT: 313.05 LBS | BODY MASS INDEX: 43.83 KG/M2 | HEIGHT: 71 IN | DIASTOLIC BLOOD PRESSURE: 82 MMHG | TEMPERATURE: 97.7 F | SYSTOLIC BLOOD PRESSURE: 146 MMHG

## 2019-02-27 DIAGNOSIS — M25.561 RIGHT KNEE PAIN: ICD-10-CM

## 2019-02-27 DIAGNOSIS — J45.901 ASTHMA EXACERBATION: Primary | ICD-10-CM

## 2019-02-27 DIAGNOSIS — R05.9 COUGH: ICD-10-CM

## 2019-02-27 LAB
ANION GAP SERPL CALCULATED.3IONS-SCNC: 6 MMOL/L (ref 4–13)
ATRIAL RATE: 113 BPM
BASOPHILS # BLD AUTO: 0.04 THOUSANDS/ΜL (ref 0–0.1)
BASOPHILS NFR BLD AUTO: 0 % (ref 0–1)
BUN SERPL-MCNC: 14 MG/DL (ref 5–25)
CALCIUM SERPL-MCNC: 8.8 MG/DL (ref 8.3–10.1)
CHLORIDE SERPL-SCNC: 108 MMOL/L (ref 100–108)
CO2 SERPL-SCNC: 27 MMOL/L (ref 21–32)
CREAT SERPL-MCNC: 1.08 MG/DL (ref 0.6–1.3)
EOSINOPHIL # BLD AUTO: 0.8 THOUSAND/ΜL (ref 0–0.61)
EOSINOPHIL NFR BLD AUTO: 6 % (ref 0–6)
ERYTHROCYTE [DISTWIDTH] IN BLOOD BY AUTOMATED COUNT: 14.2 % (ref 11.6–15.1)
GFR SERPL CREATININE-BSD FRML MDRD: 93 ML/MIN/1.73SQ M
GLUCOSE SERPL-MCNC: 128 MG/DL (ref 65–140)
HCT VFR BLD AUTO: 40.7 % (ref 36.5–49.3)
HGB BLD-MCNC: 12.8 G/DL (ref 12–17)
IMM GRANULOCYTES # BLD AUTO: 0.05 THOUSAND/UL (ref 0–0.2)
IMM GRANULOCYTES NFR BLD AUTO: 0 % (ref 0–2)
LYMPHOCYTES # BLD AUTO: 3.02 THOUSANDS/ΜL (ref 0.6–4.47)
LYMPHOCYTES NFR BLD AUTO: 24 % (ref 14–44)
MCH RBC QN AUTO: 30.1 PG (ref 26.8–34.3)
MCHC RBC AUTO-ENTMCNC: 31.4 G/DL (ref 31.4–37.4)
MCV RBC AUTO: 96 FL (ref 82–98)
MONOCYTES # BLD AUTO: 0.81 THOUSAND/ΜL (ref 0.17–1.22)
MONOCYTES NFR BLD AUTO: 6 % (ref 4–12)
NEUTROPHILS # BLD AUTO: 7.99 THOUSANDS/ΜL (ref 1.85–7.62)
NEUTS SEG NFR BLD AUTO: 64 % (ref 43–75)
NRBC BLD AUTO-RTO: 0 /100 WBCS
P AXIS: 52 DEGREES
PLATELET # BLD AUTO: 302 THOUSANDS/UL (ref 149–390)
PMV BLD AUTO: 11.2 FL (ref 8.9–12.7)
POTASSIUM SERPL-SCNC: 3.6 MMOL/L (ref 3.5–5.3)
PR INTERVAL: 120 MS
QRS AXIS: 6 DEGREES
QRSD INTERVAL: 94 MS
QT INTERVAL: 328 MS
QTC INTERVAL: 449 MS
RBC # BLD AUTO: 4.25 MILLION/UL (ref 3.88–5.62)
SODIUM SERPL-SCNC: 141 MMOL/L (ref 136–145)
T WAVE AXIS: -18 DEGREES
TROPONIN I SERPL-MCNC: <0.02 NG/ML
VENTRICULAR RATE: 113 BPM
WBC # BLD AUTO: 12.71 THOUSAND/UL (ref 4.31–10.16)

## 2019-02-27 PROCEDURE — 94760 N-INVAS EAR/PLS OXIMETRY 1: CPT

## 2019-02-27 PROCEDURE — 94644 CONT INHLJ TX 1ST HOUR: CPT

## 2019-02-27 PROCEDURE — 96375 TX/PRO/DX INJ NEW DRUG ADDON: CPT

## 2019-02-27 PROCEDURE — 80048 BASIC METABOLIC PNL TOTAL CA: CPT | Performed by: EMERGENCY MEDICINE

## 2019-02-27 PROCEDURE — 93005 ELECTROCARDIOGRAM TRACING: CPT

## 2019-02-27 PROCEDURE — 36415 COLL VENOUS BLD VENIPUNCTURE: CPT | Performed by: EMERGENCY MEDICINE

## 2019-02-27 PROCEDURE — 85025 COMPLETE CBC W/AUTO DIFF WBC: CPT | Performed by: EMERGENCY MEDICINE

## 2019-02-27 PROCEDURE — 73564 X-RAY EXAM KNEE 4 OR MORE: CPT

## 2019-02-27 PROCEDURE — 93010 ELECTROCARDIOGRAM REPORT: CPT | Performed by: INTERNAL MEDICINE

## 2019-02-27 PROCEDURE — 96372 THER/PROPH/DIAG INJ SC/IM: CPT

## 2019-02-27 PROCEDURE — 71045 X-RAY EXAM CHEST 1 VIEW: CPT

## 2019-02-27 PROCEDURE — 96361 HYDRATE IV INFUSION ADD-ON: CPT

## 2019-02-27 PROCEDURE — 96365 THER/PROPH/DIAG IV INF INIT: CPT

## 2019-02-27 PROCEDURE — 99284 EMERGENCY DEPT VISIT MOD MDM: CPT

## 2019-02-27 PROCEDURE — 84484 ASSAY OF TROPONIN QUANT: CPT | Performed by: EMERGENCY MEDICINE

## 2019-02-27 RX ORDER — ACETAMINOPHEN 325 MG/1
975 TABLET ORAL ONCE
Status: COMPLETED | OUTPATIENT
Start: 2019-02-27 | End: 2019-02-27

## 2019-02-27 RX ORDER — MAGNESIUM SULFATE HEPTAHYDRATE 40 MG/ML
2 INJECTION, SOLUTION INTRAVENOUS ONCE
Status: COMPLETED | OUTPATIENT
Start: 2019-02-27 | End: 2019-02-27

## 2019-02-27 RX ORDER — ALBUTEROL SULFATE 90 UG/1
2 AEROSOL, METERED RESPIRATORY (INHALATION) EVERY 4 HOURS PRN
Qty: 1 INHALER | Refills: 0 | Status: SHIPPED | OUTPATIENT
Start: 2019-02-27 | End: 2019-10-03 | Stop reason: HOSPADM

## 2019-02-27 RX ORDER — KETOROLAC TROMETHAMINE 30 MG/ML
15 INJECTION, SOLUTION INTRAMUSCULAR; INTRAVENOUS ONCE
Status: COMPLETED | OUTPATIENT
Start: 2019-02-27 | End: 2019-02-27

## 2019-02-27 RX ORDER — METHYLPREDNISOLONE SODIUM SUCCINATE 125 MG/2ML
125 INJECTION, POWDER, LYOPHILIZED, FOR SOLUTION INTRAMUSCULAR; INTRAVENOUS ONCE
Status: COMPLETED | OUTPATIENT
Start: 2019-02-27 | End: 2019-02-27

## 2019-02-27 RX ORDER — TERBUTALINE SULFATE 1 MG/ML
0.25 INJECTION, SOLUTION SUBCUTANEOUS ONCE
Status: COMPLETED | OUTPATIENT
Start: 2019-02-27 | End: 2019-02-27

## 2019-02-27 RX ORDER — SODIUM CHLORIDE FOR INHALATION 0.9 %
3 VIAL, NEBULIZER (ML) INHALATION ONCE
Status: COMPLETED | OUTPATIENT
Start: 2019-02-27 | End: 2019-02-27

## 2019-02-27 RX ADMIN — KETOROLAC TROMETHAMINE 15 MG: 30 INJECTION, SOLUTION INTRAMUSCULAR at 05:57

## 2019-02-27 RX ADMIN — TERBUTALINE SULFATE 0.25 MG: 1 INJECTION, SOLUTION SUBCUTANEOUS at 06:19

## 2019-02-27 RX ADMIN — IPRATROPIUM BROMIDE 1 MG: 0.5 SOLUTION RESPIRATORY (INHALATION) at 05:27

## 2019-02-27 RX ADMIN — SODIUM CHLORIDE 1000 ML: 0.9 INJECTION, SOLUTION INTRAVENOUS at 05:11

## 2019-02-27 RX ADMIN — METHYLPREDNISOLONE SODIUM SUCCINATE 125 MG: 125 INJECTION, POWDER, FOR SOLUTION INTRAMUSCULAR; INTRAVENOUS at 05:23

## 2019-02-27 RX ADMIN — ACETAMINOPHEN 975 MG: 325 TABLET ORAL at 05:42

## 2019-02-27 RX ADMIN — ALBUTEROL SULFATE 10 MG: 2.5 SOLUTION RESPIRATORY (INHALATION) at 05:26

## 2019-02-27 RX ADMIN — ISODIUM CHLORIDE 3 ML: 0.03 SOLUTION RESPIRATORY (INHALATION) at 05:27

## 2019-02-27 RX ADMIN — MAGNESIUM SULFATE HEPTAHYDRATE 2 G: 40 INJECTION, SOLUTION INTRAVENOUS at 05:23

## 2019-02-27 NOTE — ED ATTENDING ATTESTATION
Emergency Department Note- Malvin Hawley 29 y o  male MRN: [id number]    Unit/Bed#: ED 15 Encounter: [id number]    Malvin Hawley is a 29 y o  male who presents with   Chief Complaint   Patient presents with    Asthma     Pt reports SOB around 2300, did report one syncopal episode  Only remembers "waking up on the floor after hyperventilating"  Given DueoNeb and 5 albuterol per EMS  History of Present Illness   HPI:  Malvin Hawley is a 29 y o  male who presents with shortness of breath  Patient has a history of asthma  Patient was treated with nebulizer treatments EN route  He states he also was hyperventilating as syncopal event striking his right knee  Given syncope and patient cardiac risk factors, will perform cardiac evaluation, administer an hour long albuterol 10 mg/Atrovent 1 mg continuous nebulizer treatment, Solu-Medrol 125 mg IV  Will x-ray right knee and performed chest x-ray as well  ROS is otherwise unremarkable      Historical Information   Past Medical History:   Diagnosis Date    Anxiety     Asthma     [facility]ch[facility] obstructive [facility] disease) ([facility] )     Depression     Diabetes mellitus ([facility] )     Disease of thyroid gland     Hypertension     IBS (irritable bowel syndrome)     Psychiatric disorder     Bipolar Disorder     Past Surgical History:   Procedure Laterality Date    KNEE SURGERY Right     TONSILLECTOMY       Social History   Social History     Substance and Sexual Activity   Alcohol Use No    Comment: denies      Social History     Substance and Sexual Activity   Drug Use Yes    Types: MDMA (ecstacy), Marijuana, Methamphetamines, Cocaine    Comment: reports recent use (overdose) only      Social History     Tobacco Use   Smoking Status Former Smoker    Types: Cigarettes    Last attempt to quit: 10/1/2018    Years since quittin 4   Smokeless Tobacco Never Used       Family History:   Meds/Allergies     Allergies   Allergen Reactions    Pollen Extract     Toradol [Ketorolac Tromethamine] Hives       Objective   First Vitals:   Blood Pressure: 156/80 (19)  Pulse: (!) 111 (19)  Temperature: 97 7 °F (36 5 °C) (19)  Temp Source: Oral (19)  Respirations: (!) 24 (19)  Height: 5' 11" (180 3 cm) (19)  Weight - Scale: (!) 142 kg (313 lb 0 9 oz) (19)  SpO2: 96 % (19)    Current Vitals:   Blood Pressure: 156/80 (19)  Pulse: (!) 111 (19)  Temperature: 97 7 °F (36 5 °C) (19)  Temp Source: Oral (19)  Respirations: (!) 24 (19)  Height: 5' 11" (180 3 cm) (19)  Weight - Scale: (!) 142 kg (313 lb 0 9 oz) (19)  SpO2: 96 % (19)    No intake or output data in the 24 hours ending 19 0611    Invasive Devices     Peripheral Intravenous Line            Peripheral IV 19 Right Antecubital less than 1 day                      Medical Decision Makin  Patient remains symptomatic will be treated with magnesium 2 g IV  Chest x-ray negative, right knee x-ray negative  EKG sinus tachycardia 110 beats per minute but otherwise no acute ischemic changes with no strain pattern and normal intervals  Patient was again treated with additional medications in the form of subcutaneous terbutaline  Plan is for patient to be admitted  After discussion with admitting team, patient decided he did not wish to be admitted to the hospital and his breathing was better  Discussed risks and benefits with patient and again recommended admission but he states his breathing is fine at this time  He is not hypoxic and does not appear in any respiratory distress at this time  Lung exam has improved  Will discharge home with prednisone , patient has albuterol at home already  Follow up with primary care physician, return if condition worsens      Recent Results (from the past 36 hour(s))   CBC and differential Collection Time: 02/27/19  5:22 AM   Result Value Ref Range    WBC 12 71 (H) 4 31 - 10 16 Thousand/uL    RBC 4 25 3 88 - 5 62 Million/uL    Hemoglobin 12 8 12 0 - 17 0 g/dL    Hematocrit 40 7 36 5 - 49 3 %    MCV 96 82 - 98 fL    MCH 30 1 26 8 - 34 3 pg    MCHC 31 4 31 4 - 37 4 g/dL    RDW 14 2 11 6 - 15 1 %    MPV 11 2 8 9 - 12 7 fL    Platelets 313 301 - 206 Thousands/uL    nRBC 0 /100 WBCs    Neutrophils Relative 64 43 - 75 %    Immat GRANS % 0 0 - 2 %    Lymphocytes Relative 24 14 - 44 %    Monocytes Relative 6 4 - 12 %    Eosinophils Relative 6 0 - 6 %    Basophils Relative 0 0 - 1 %    Neutrophils Absolute 7 99 (H) 1 85 - 7 62 Thousands/µL    Immature Grans Absolute 0 05 0 00 - 0 20 Thousand/uL    Lymphocytes Absolute 3 02 0 60 - 4 47 Thousands/µL    Monocytes Absolute 0 81 0 17 - 1 22 Thousand/µL    Eosinophils Absolute 0 80 (H) 0 00 - 0 61 Thousand/µL    Basophils Absolute 0 04 0 00 - 0 10 Thousands/µL   Basic metabolic panel    Collection Time: 02/27/19  5:22 AM   Result Value Ref Range    Sodium 141 136 - 145 mmol/L    Potassium 3 6 3 5 - 5 3 mmol/L    Chloride 108 100 - 108 mmol/L    CO2 27 21 - 32 mmol/L    ANION GAP 6 4 - 13 mmol/L    BUN 14 5 - 25 mg/dL    Creatinine 1 08 0 60 - 1 30 mg/dL    Glucose 128 65 - 140 mg/dL    Calcium 8 8 8 3 - 10 1 mg/dL    eGFR 93 ml/min/1 73sq m   Troponin I    Collection Time: 02/27/19  5:22 AM   Result Value Ref Range    Troponin I <0 02 <=0 04 ng/mL     XR chest portable   ED Interpretation   No acute cardio-pulmonary disease  No infiltrate noted            XR knee 4+ vw right injury   ED Interpretation   No acute fracture or dislocation  Previous surgical screws in place                  Portions of the record may have been created with voice recognition software  Occasional wrong word or "sound a like" substitutions may have occurred due to the inherent limitations of voice recognition software  Quintin Minor DO, saw and evaluated the patient   I have discussed the patient with the resident/non-physician practitioner and agree with the resident's/non-physician practitioner's findings, Plan of Care, and MDM as documented in the resident's/non-physician practitioner's note, except where noted  All available labs and Radiology studies were reviewed  I was present for key portions of any procedure(s) performed by the resident/non-physician practitioner and I was immediately available to provide assistance  At this point I agree with the current assessment done in the Emergency Department    I have conducted an independent evaluation of this patient a history and physical is as follows:      Critical Care Time  CriticalCare Time  Performed by: Nina Maldonado DO  Authorized by: Nina Maldonado DO     Critical care provider statement:     Critical care time (minutes):  45    Critical care time was exclusive of:  Separately billable procedures and treating other patients and teaching time    Critical care was necessary to treat or prevent imminent or life-threatening deterioration of the following conditions:  Respiratory failure    Critical care was time spent personally by me on the following activities:  Blood draw for specimens, obtaining history from patient or surrogate, development of treatment plan with patient or surrogate, discussions with consultants, discussions with primary provider, evaluation of patient's response to treatment, examination of patient, ordering and performing treatments and interventions, ordering and review of laboratory studies, ordering and review of radiographic studies, re-evaluation of patient's condition and review of old charts    I assumed direction of critical care for this patient from another provider in my specialty: no

## 2019-02-27 NOTE — ED PROVIDER NOTES
History  Chief Complaint   Patient presents with    Asthma     Pt reports SOB around 2300, did report one syncopal episode  Only remembers "waking up on the floor after hyperventilating"  Given DueoNeb and 5 albuterol per EMS  Patient is a 63-year-old male with history of asthma, high blood pressure, high cholesterol diabetes who presents for shortness of breath  Patient says he has had a productive cough of greenish mucus over the past 3 days and has been using his albuterol inhaler more often than usual   He says that around 11 o'clock tonight his breathing got worse  Patient says that he was hyperventilating and had a syncopal episode  He does not remember the event but remembers waking up on the floor  He says that he landed on his right knees complaining of right knee pain  Patient admits to wheezing, shortness of breath and chest tightness that is worse when he takes a deep breath  He says that this feels similar to previous asthma exacerbations that he has had  Patient has been  Admitted for his asthma in the past but has never been intubated  Patient received 1 DuoNeb and 2 additional doses of albuterol in transport which EMS says helped with his symptoms  Prior to Admission Medications   Prescriptions Last Dose Informant Patient Reported? Taking?    ARIPiprazole (ABILIFY) 5 mg tablet   No Yes   Sig: Take 1 tablet (5 mg total) by mouth daily At 9am   LORazepam (ATIVAN) 1 mg tablet 2/26/2019 at 2100  No Yes   Sig: Take 1 tablet (1 mg total) by mouth 2 (two) times a day At 9am and 6pm   albuterol (2 5 mg/3 mL) 0 083 % nebulizer solution   Yes Yes   Sig: Take 2 5 mg by nebulization as needed for wheezing or shortness of breath   albuterol (PROVENTIL HFA,VENTOLIN HFA) 90 mcg/act inhaler   No Yes   Sig: Inhale 2 puffs every 6 (six) hours as needed for wheezing   carvedilol (COREG) 6 25 mg tablet   No Yes   Sig: Take 1 tablet (6 25 mg total) by mouth 2 (two) times a day with meals At breakfast and dinner   fluticasone-salmeterol (ADVAIR HFA) 45-21 MCG/ACT inhaler   No Yes   Sig: Inhale 2 puffs 2 (two) times a day Rinse mouth after use    hydrochlorothiazide (HYDRODIURIL) 25 mg tablet   No Yes   Sig: Take 1 tablet (25 mg total) by mouth 2 (two) times a day At 9am and 6pm   levothyroxine 25 mcg tablet   No Yes   Sig: Take 1 tablet (25 mcg total) by mouth daily in the early morning   lisinopril (ZESTRIL) 20 mg tablet   No Yes   Sig: Take 1 tablet (20 mg total) by mouth 2 (two) times a day At 9am and 6pm   metFORMIN (GLUCOPHAGE) 500 mg tablet   No Yes   Sig: Take 1 tablet (500 mg total) by mouth 2 (two) times a day with meals With breakfast and dinner   montelukast (SINGULAIR) 10 mg tablet   No Yes   Sig: Take 1 tablet (10 mg total) by mouth daily at bedtime   omeprazole (PriLOSEC) 20 mg delayed release capsule   No Yes   Sig: Take 1 capsule (20 mg total) by mouth daily Before breakfast   sertraline (ZOLOFT) 100 mg tablet 2/26/2019 at Unknown time  No Yes   Sig: Take 1 tablet (100 mg total) by mouth daily At 9am   zolpidem (AMBIEN) 10 mg tablet   No Yes   Sig: Take 1 tablet (10 mg total) by mouth daily at bedtime as needed for sleep      Facility-Administered Medications: None       Past Medical History:   Diagnosis Date    Anxiety     Asthma     COPD (chronic obstructive pulmonary disease) (HCC)     Depression     Diabetes mellitus (HCC)     Disease of thyroid gland     Hypertension     IBS (irritable bowel syndrome)     Psychiatric disorder     Bipolar Disorder       Past Surgical History:   Procedure Laterality Date    KNEE SURGERY Right     TONSILLECTOMY         Family History   Problem Relation Age of Onset    Anxiety disorder Mother     Depression Mother     Bipolar disorder Mother     Anxiety disorder Maternal Aunt     Anxiety disorder Maternal Grandmother      I have reviewed and agree with the history as documented      Social History     Tobacco Use    Smoking status: Former Smoker Types: Cigarettes     Last attempt to quit: 10/1/2018     Years since quittin 4    Smokeless tobacco: Never Used   Substance Use Topics    Alcohol use: No     Comment: denies     Drug use: Yes     Types: MDMA (ecstacy), Marijuana, Methamphetamines, Cocaine     Comment: reports recent use (overdose) only         Review of Systems   Constitutional: Negative for chills, fever and unexpected weight change  HENT: Negative for congestion, sore throat and trouble swallowing  Eyes: Negative for pain, discharge and itching  Respiratory: Positive for cough, chest tightness, shortness of breath and wheezing  Cardiovascular: Negative for chest pain, palpitations and leg swelling  Gastrointestinal: Negative for abdominal pain, blood in stool, diarrhea, nausea and vomiting  Endocrine: Negative for polyuria  Genitourinary: Negative for difficulty urinating, dysuria, frequency and hematuria  Musculoskeletal: Positive for arthralgias (  Right knee)  Negative for back pain  Skin: Negative for color change and rash  Neurological: Positive for syncope  Negative for dizziness, weakness, light-headedness and headaches  Physical Exam  ED Triage Vitals   Temperature Pulse Respirations Blood Pressure SpO2   19 0453 19 0453 19 0453 19 0456 19 0453   97 7 °F (36 5 °C) (!) 111 (!) 24 156/80 96 %      Temp Source Heart Rate Source Patient Position - Orthostatic VS BP Location FiO2 (%)   19 0453 19 0453 19 0456 19 0456 --   Oral Monitor Sitting Right arm       Pain Score       19 0453       No Pain           Orthostatic Vital Signs  Vitals:    19 0453 19 0456 19 0600 19 0700   BP:  156/80 146/82    Pulse: (!) 111  (!) 114 (!) 112   Patient Position - Orthostatic VS:  Sitting Lying        Physical Exam   Constitutional: He is oriented to person, place, and time  He appears well-developed and well-nourished  No distress  HENT:   Head: Normocephalic and atraumatic  Right Ear: External ear normal    Left Ear: External ear normal    Eyes: Pupils are equal, round, and reactive to light  Conjunctivae and EOM are normal    Neck: Normal range of motion  Cardiovascular: Normal rate, regular rhythm, normal heart sounds and intact distal pulses  Exam reveals no gallop and no friction rub  No murmur heard  Pulmonary/Chest: Effort normal  Tachypnea noted  No respiratory distress  He has wheezes  He has no rales  Abdominal: Soft  Bowel sounds are normal  He exhibits no distension  There is no tenderness  There is no guarding  Lymphadenopathy:     He has no cervical adenopathy  Neurological: He is alert and oriented to person, place, and time  No cranial nerve deficit or sensory deficit  He exhibits normal muscle tone  Skin: Skin is warm and dry  Psychiatric: He has a normal mood and affect  His behavior is normal    Nursing note and vitals reviewed        ED Medications  Medications    EMS REPLENISHMENT MED ( Does not apply Given to EMS 2/27/19 0518)   sodium chloride 0 9 % bolus 1,000 mL (0 mL Intravenous Stopped 2/27/19 0654)   methylPREDNISolone sodium succinate (Solu-MEDROL) injection 125 mg (125 mg Intravenous Given 2/27/19 0523)   magnesium sulfate 2 g/50 mL IVPB (premix) 2 g (0 g Intravenous Stopped 2/27/19 0557)   albuterol inhalation solution 10 mg (10 mg Nebulization Given 2/27/19 0526)     And   ipratropium (ATROVENT) 0 02 % inhalation solution 1 mg (1 mg Nebulization Given 2/27/19 0527)     And   sodium chloride 0 9 % inhalation solution 3 mL (3 mL Nebulization Given 2/27/19 0527)   acetaminophen (TYLENOL) tablet 975 mg (975 mg Oral Given 2/27/19 0542)   ketorolac (TORADOL) injection 15 mg (15 mg Intravenous Given 2/27/19 0557)   terbutaline (BRETHINE) injection 0 25 mg (0 25 mg Subcutaneous Given 2/27/19 2351)       Diagnostic Studies  Results Reviewed     Procedure Component Value Units Date/Time    Troponin I [[id number]]  (Normal) Collected:  02/27/19 0522    Lab Status:  Final result Specimen:  Blood from Arm, Right Updated:  02/27/19 0605     Troponin I <0 02 ng/mL     Basic metabolic panel [[id number]] Collected:  02/27/19 0522    Lab Status:  Final result Specimen:  Blood from Arm, Right Updated:  02/27/19 0601     Sodium 141 mmol/L      Potassium 3 6 mmol/L      Chloride 108 mmol/L      CO2 27 mmol/L      ANION GAP 6 mmol/L      BUN 14 mg/dL      Creatinine 1 08 mg/dL      Glucose 128 mg/dL      Calcium 8 8 mg/dL      eGFR 93 ml/min/1 73sq m     Narrative:       National Kidney Disease Education Program recommendations are as follows:  GFR calculation is accurate only with a steady state creatinine  Chronic Kidney disease less than 60 ml/min/1 73 sq  meters  Kidney failure less than 15 ml/min/1 73 sq  meters  CBC and differential [[id number]]  (Abnormal) Collected:  02/27/19 0522    Lab Status:  Final result Specimen:  Blood from Arm, Right Updated:  02/27/19 0546     WBC 12 71 Thousand/uL      RBC 4 25 Million/uL      Hemoglobin 12 8 g/dL      Hematocrit 40 7 %      MCV 96 fL      MCH 30 1 pg      MCHC 31 4 g/dL      RDW 14 2 %      MPV 11 2 fL      Platelets 200 Thousands/uL      nRBC 0 /100 WBCs      Neutrophils Relative 64 %      Immat GRANS % 0 %      Lymphocytes Relative 24 %      Monocytes Relative 6 %      Eosinophils Relative 6 %      Basophils Relative 0 %      Neutrophils Absolute 7 99 Thousands/µL      Immature Grans Absolute 0 05 Thousand/uL      Lymphocytes Absolute 3 02 Thousands/µL      Monocytes Absolute 0 81 Thousand/µL      Eosinophils Absolute 0 80 Thousand/µL      Basophils Absolute 0 04 Thousands/µL                  XR chest portable   ED Interpretation by Christine Zapata DO (02/27 0541)   No acute cardio-pulmonary disease  No infiltrate noted      Final Result by Fabien Olivera MD (02/27 2159)      No acute cardiopulmonary disease              Workstation performed: [id number]         XR knee 4+ vw right injury   ED Interpretation by Katie Valle DO (02/27 0542)   No acute fracture or dislocation  Previous surgical screws in place      Final Result by Omero Lee MD (02/27 2873)      No acute osseous abnormality  Workstation performed: [id number]               Procedures  Procedures      Phone Consults  ED Phone Contact    ED Course  ED Course as of Mar 01 0700   Wed Feb 27, 2019   9112 Patient's breathing has improved with medications, however, he is is still diffusely wheezy and does not feel like he is at his baseline  He is satting 94% on room air  Will admit patient to hospital for asthma exacerbation and episode of syncope         0700 Patient now says that he is feeling better and would like to go home  Will give patient prescription for albuterol solution and inhaler  Patient told to return to ED if breathing worsens  MDM  Number of Diagnoses or Management Options  Diagnosis management comments:   19-year-old male with history of asthma who presents for shortness of breath, cough  Patient also had syncopal episode prior to event  Patient landed on right knee  Complaining of right knee pain  Will obtain cardiac workup given syncope, will obtain chest x-ray given productive cough  Will give heart neb, Solu-Medrol, magnesium  Will reassess   x-ray negative for acute fracture, no pneumonia seen on chest x-ray        Disposition  Final diagnoses:   Asthma exacerbation   Cough   Right knee pain     Time reflects when diagnosis was documented in both MDM as applicable and the Disposition within this note     Time User Action Codes Description Comment    2/27/2019  6:09 AM Erika Piper Add [J45 901] Asthma exacerbation     2/27/2019  6:09 AM Erika Piper Add [R05] Cough     2/27/2019  6:09 AM Erika Piper Add [M25 561] Right knee pain       ED Disposition     ED Disposition Condition Date/Time Comment    Discharge Stable Wed Feb 27, 2019 6:55 AM David Newton discharge to home/self care  Follow-up Information    None         Discharge Medication List as of 2/27/2019  6:57 AM      START taking these medications    Details   albuterol (5 mg/mL) 0 5 % nebulizer solution Take 0 5 mL (2 5 mg total) by nebulization every 6 (six) hours as needed for wheezing, Starting Wed 2/27/2019, Print      !! albuterol (PROVENTIL HFA,VENTOLIN HFA) 90 mcg/act inhaler Inhale 2 puffs every 4 (four) hours as needed for wheezing, Starting Wed 2/27/2019, Print       !! - Potential duplicate medications found  Please discuss with provider        CONTINUE these medications which have NOT CHANGED    Details   albuterol (2 5 mg/3 mL) 0 083 % nebulizer solution Take 2 5 mg by nebulization as needed for wheezing or shortness of breath, Historical Med      !! albuterol (PROVENTIL HFA,VENTOLIN HFA) 90 mcg/act inhaler Inhale 2 puffs every 6 (six) hours as needed for wheezing, Starting Tue 12/18/2018, Print      ARIPiprazole (ABILIFY) 5 mg tablet Take 1 tablet (5 mg total) by mouth daily At 9am, Starting Wed 12/19/2018, Print      carvedilol (COREG) 6 25 mg tablet Take 1 tablet (6 25 mg total) by mouth 2 (two) times a day with meals At breakfast and dinner, Starting Tue 12/18/2018, Print      fluticasone-salmeterol (ADVAIR HFA) 45-21 MCG/ACT inhaler Inhale 2 puffs 2 (two) times a day Rinse mouth after use , Starting Tue 12/18/2018, Print      hydrochlorothiazide (HYDRODIURIL) 25 mg tablet Take 1 tablet (25 mg total) by mouth 2 (two) times a day At 9am and 6pm, Starting Tue 12/18/2018, Print      levothyroxine 25 mcg tablet Take 1 tablet (25 mcg total) by mouth daily in the early morning, Starting Wed 12/19/2018, Print      lisinopril (ZESTRIL) 20 mg tablet Take 1 tablet (20 mg total) by mouth 2 (two) times a day At 9am and 6pm, Starting Tue 12/18/2018, Print      LORazepam (ATIVAN) 1 mg tablet Take 1 tablet (1 mg total) by mouth 2 (two) times a day At 9am and 6pm, Starting Tue 12/18/2018, Print      metFORMIN (GLUCOPHAGE) 500 mg tablet Take 1 tablet (500 mg total) by mouth 2 (two) times a day with meals With breakfast and dinner, Starting Tue 12/18/2018, Print      montelukast (SINGULAIR) 10 mg tablet Take 1 tablet (10 mg total) by mouth daily at bedtime, Starting Tue 12/18/2018, Print      omeprazole (PriLOSEC) 20 mg delayed release capsule Take 1 capsule (20 mg total) by mouth daily Before breakfast, Starting Tue 12/18/2018, Print      sertraline (ZOLOFT) 100 mg tablet Take 1 tablet (100 mg total) by mouth daily At 9am, Starting Wed 12/19/2018, Print      zolpidem (AMBIEN) 10 mg tablet Take 1 tablet (10 mg total) by mouth daily at bedtime as needed for sleep, Starting Tue 12/18/2018, Print       !! - Potential duplicate medications found  Please discuss with provider  No discharge procedures on file  ED Provider  Attending physically available and evaluated Cinthia Son I managed the patient along with the ED Attending      Electronically Signed by         Tevin Love DO  03/01/19 0700

## 2019-07-21 ENCOUNTER — HOSPITAL ENCOUNTER (EMERGENCY)
Facility: HOSPITAL | Age: 29
Discharge: HOME | End: 2019-07-21
Attending: EMERGENCY MEDICINE
Payer: MEDICARE

## 2019-07-21 VITALS
BODY MASS INDEX: 43.66 KG/M2 | WEIGHT: 313.05 LBS | RESPIRATION RATE: 18 BRPM | SYSTOLIC BLOOD PRESSURE: 116 MMHG | HEART RATE: 104 BPM | DIASTOLIC BLOOD PRESSURE: 64 MMHG | OXYGEN SATURATION: 97 % | TEMPERATURE: 99.3 F

## 2019-07-21 DIAGNOSIS — T42.4X1A OVERDOSE OF BENZODIAZEPINE, ACCIDENTAL OR UNINTENTIONAL, INITIAL ENCOUNTER: Primary | ICD-10-CM

## 2019-07-21 LAB
ALBUMIN SERPL BCP-MCNC: 4.1 G/DL (ref 3.5–5)
ALP SERPL-CCNC: 111 U/L (ref 46–116)
ALT SERPL W P-5'-P-CCNC: 101 U/L (ref 12–78)
AMPHETAMINES SERPL QL SCN: NEGATIVE
ANION GAP SERPL CALCULATED.3IONS-SCNC: 7 MMOL/L (ref 4–13)
APAP SERPL-MCNC: <2 UG/ML (ref 10–20)
AST SERPL W P-5'-P-CCNC: 61 U/L (ref 5–45)
ATRIAL RATE: 127 BPM
BARBITURATES UR QL: NEGATIVE
BENZODIAZ UR QL: POSITIVE
BILIRUB SERPL-MCNC: 0.22 MG/DL (ref 0.2–1)
BUN SERPL-MCNC: 21 MG/DL (ref 5–25)
CALCIUM SERPL-MCNC: 9.6 MG/DL (ref 8.3–10.1)
CHLORIDE SERPL-SCNC: 108 MMOL/L (ref 100–108)
CO2 SERPL-SCNC: 27 MMOL/L (ref 21–32)
COCAINE UR QL: NEGATIVE
CREAT SERPL-MCNC: 1.04 MG/DL (ref 0.6–1.3)
ETHANOL EXG-MCNC: 0 MG/DL
ETHANOL SERPL-MCNC: <3 MG/DL (ref 0–3)
GFR SERPL CREATININE-BSD FRML MDRD: 97 ML/MIN/1.73SQ M
GLUCOSE SERPL-MCNC: 119 MG/DL (ref 65–140)
METHADONE UR QL: NEGATIVE
OPIATES UR QL SCN: POSITIVE
P AXIS: 56 DEGREES
PCP UR QL: NEGATIVE
POTASSIUM SERPL-SCNC: 4.3 MMOL/L (ref 3.5–5.3)
PR INTERVAL: 126 MS
PROT SERPL-MCNC: 7.2 G/DL (ref 6.4–8.2)
QRS AXIS: 36 DEGREES
QRSD INTERVAL: 90 MS
QT INTERVAL: 306 MS
QTC INTERVAL: 444 MS
SALICYLATES SERPL-MCNC: <3 MG/DL (ref 3–20)
SODIUM SERPL-SCNC: 142 MMOL/L (ref 136–145)
T WAVE AXIS: -23 DEGREES
THC UR QL: POSITIVE
VENTRICULAR RATE: 127 BPM

## 2019-07-21 PROCEDURE — 80320 DRUG SCREEN QUANTALCOHOLS: CPT | Performed by: EMERGENCY MEDICINE

## 2019-07-21 PROCEDURE — 96360 HYDRATION IV INFUSION INIT: CPT

## 2019-07-21 PROCEDURE — 99284 EMERGENCY DEPT VISIT MOD MDM: CPT

## 2019-07-21 PROCEDURE — 80053 COMPREHEN METABOLIC PANEL: CPT | Performed by: EMERGENCY MEDICINE

## 2019-07-21 PROCEDURE — 99284 EMERGENCY DEPT VISIT MOD MDM: CPT | Performed by: EMERGENCY MEDICINE

## 2019-07-21 PROCEDURE — 93005 ELECTROCARDIOGRAM TRACING: CPT

## 2019-07-21 PROCEDURE — 80329 ANALGESICS NON-OPIOID 1 OR 2: CPT | Performed by: EMERGENCY MEDICINE

## 2019-07-21 PROCEDURE — 93010 ELECTROCARDIOGRAM REPORT: CPT | Performed by: INTERNAL MEDICINE

## 2019-07-21 PROCEDURE — 80307 DRUG TEST PRSMV CHEM ANLYZR: CPT | Performed by: EMERGENCY MEDICINE

## 2019-07-21 PROCEDURE — 36415 COLL VENOUS BLD VENIPUNCTURE: CPT | Performed by: EMERGENCY MEDICINE

## 2019-07-21 PROCEDURE — NC001 PR NO CHARGE: Performed by: EMERGENCY MEDICINE

## 2019-07-21 PROCEDURE — 99449 NTRPROF PH1/NTRNET/EHR 31/>: CPT | Performed by: EMERGENCY MEDICINE

## 2019-07-21 PROCEDURE — 82075 ASSAY OF BREATH ETHANOL: CPT | Performed by: EMERGENCY MEDICINE

## 2019-07-21 PROCEDURE — 96361 HYDRATE IV INFUSION ADD-ON: CPT

## 2019-07-21 RX ORDER — NALOXONE HYDROCHLORIDE 1 MG/ML
1 INJECTION PARENTERAL ONCE
Status: COMPLETED | OUTPATIENT
Start: 2019-07-21 | End: 2019-07-21

## 2019-07-21 RX ADMIN — SODIUM CHLORIDE 1000 ML: 0.9 INJECTION, SOLUTION INTRAVENOUS at 08:22

## 2019-07-21 RX ADMIN — SODIUM CHLORIDE 1000 ML: 0.9 INJECTION, SOLUTION INTRAVENOUS at 13:20

## 2019-07-21 RX ADMIN — SODIUM CHLORIDE 1000 ML: 0.9 INJECTION, SOLUTION INTRAVENOUS at 11:22

## 2019-07-21 NOTE — ED PROVIDER NOTES
History  Chief Complaint   Patient presents with    Overdose - Accidental     Patient reports taking , 1mg Xanax totalling in 4mg after having an anxiety attack  Found to be very sleepy and EMS called  Patient is a 80-year-old male with history of drug abuse, diabetes, depression who presents for evaluation following an overdose  Per EMS, patient was found altered and admitted to taking multiple Xanax pills  He was noted to be hypoxic for them and was given 2 mg of Narcan without significant improvement  My evaluation, patient has slurred speech and is drowsy  He does report taking for 1 mg Xanax pills that he obtain online from an illegal source  He insists that he took these medications to treated panic attack  He has a history of panic attacks and takes lorazepam daily at home  He denies current symptoms of depression, suicidal ideation  Prior to Admission Medications   Prescriptions Last Dose Informant Patient Reported? Taking?    ARIPiprazole (ABILIFY) 5 mg tablet   No Yes   Sig: Take 1 tablet (5 mg total) by mouth daily At 9am   LORazepam (ATIVAN) 1 mg tablet   No Yes   Sig: Take 1 tablet (1 mg total) by mouth 2 (two) times a day At 9am and 6pm   albuterol (2 5 mg/3 mL) 0 083 % nebulizer solution   Yes Yes   Sig: Take 2 5 mg by nebulization as needed for wheezing or shortness of breath   albuterol (5 mg/mL) 0 5 % nebulizer solution   No No   Sig: Take 0 5 mL (2 5 mg total) by nebulization every 6 (six) hours as needed for wheezing   albuterol (PROVENTIL HFA,VENTOLIN HFA) 90 mcg/act inhaler   No Yes   Sig: Inhale 2 puffs every 6 (six) hours as needed for wheezing   albuterol (PROVENTIL HFA,VENTOLIN HFA) 90 mcg/act inhaler   No No   Sig: Inhale 2 puffs every 4 (four) hours as needed for wheezing   carvedilol (COREG) 6 25 mg tablet   No Yes   Sig: Take 1 tablet (6 25 mg total) by mouth 2 (two) times a day with meals At breakfast and dinner   fluticasone-salmeterol (ADVAIR HFA) 45-21 MCG/ACT inhaler   No Yes   Sig: Inhale 2 puffs 2 (two) times a day Rinse mouth after use    hydrochlorothiazide (HYDRODIURIL) 25 mg tablet   No Yes   Sig: Take 1 tablet (25 mg total) by mouth 2 (two) times a day At 9am and 6pm   levothyroxine 25 mcg tablet   No Yes   Sig: Take 1 tablet (25 mcg total) by mouth daily in the early morning   lisinopril (ZESTRIL) 20 mg tablet   No Yes   Sig: Take 1 tablet (20 mg total) by mouth 2 (two) times a day At 9am and 6pm   metFORMIN (GLUCOPHAGE) 500 mg tablet   No Yes   Sig: Take 1 tablet (500 mg total) by mouth 2 (two) times a day with meals With breakfast and dinner   montelukast (SINGULAIR) 10 mg tablet   No Yes   Sig: Take 1 tablet (10 mg total) by mouth daily at bedtime   omeprazole (PriLOSEC) 20 mg delayed release capsule   No Yes   Sig: Take 1 capsule (20 mg total) by mouth daily Before breakfast   sertraline (ZOLOFT) 100 mg tablet   No Yes   Sig: Take 1 tablet (100 mg total) by mouth daily At 9am   zolpidem (AMBIEN) 10 mg tablet   No Yes   Sig: Take 1 tablet (10 mg total) by mouth daily at bedtime as needed for sleep      Facility-Administered Medications: None       Past Medical History:   Diagnosis Date    Anxiety     Asthma     COPD (chronic obstructive pulmonary disease) (HCC)     Depression     Diabetes mellitus (HCC)     Disease of thyroid gland     Hypertension     IBS (irritable bowel syndrome)     Psychiatric disorder     Bipolar Disorder       Past Surgical History:   Procedure Laterality Date    KNEE SURGERY Right     TONSILLECTOMY         Family History   Problem Relation Age of Onset    Anxiety disorder Mother     Depression Mother     Bipolar disorder Mother     Anxiety disorder Maternal Aunt     Anxiety disorder Maternal Grandmother      I have reviewed and agree with the history as documented      Social History     Tobacco Use    Smoking status: Former Smoker     Types: Cigarettes     Last attempt to quit: 10/1/2018     Years since quittin 8  Smokeless tobacco: Never Used   Substance Use Topics    Alcohol use: No     Comment: denies     Drug use: Yes     Types: MDMA (ecstacy), Marijuana, Methamphetamines, Cocaine     Comment: reports recent use (overdose) only         Review of Systems   Unable to perform ROS: Mental status change       Physical Exam  ED Triage Vitals [07/21/19 0715]   Temperature Pulse Respirations Blood Pressure SpO2   99 3 °F (37 4 °C) (!) 134 18 (!) 173/101 99 %      Temp Source Heart Rate Source Patient Position - Orthostatic VS BP Location FiO2 (%)   Oral Monitor Lying Right arm --      Pain Score       No Pain             Orthostatic Vital Signs  Vitals:    07/21/19 1200 07/21/19 1201 07/21/19 1300 07/21/19 1357   BP: 113/72 113/72 134/68 116/64   Pulse: (!) 118 (!) 117 (!) 112 104   Patient Position - Orthostatic VS: Lying Lying Lying Lying       Physical Exam   Constitutional: He is oriented to person, place, and time  He appears well-developed and well-nourished  No distress  HENT:   Head: Normocephalic and atraumatic  Right Ear: External ear normal    Left Ear: External ear normal    Mouth/Throat: No oropharyngeal exudate  Eyes: Pupils are equal, round, and reactive to light  EOM are normal  No scleral icterus  Neck: Normal range of motion  Neck supple  Cardiovascular: Regular rhythm and normal heart sounds  Tachycardia present  Pulmonary/Chest: Effort normal and breath sounds normal  No respiratory distress  Abdominal: Soft  Bowel sounds are normal  There is no tenderness  There is no rebound and no guarding  Musculoskeletal: Normal range of motion  He exhibits no edema  Neurological: He is alert and oriented to person, place, and time  Skin: Skin is warm  No rash noted  He is diaphoretic  Psychiatric: His mood appears anxious  His speech is slurred  He expresses no suicidal ideation  Nursing note and vitals reviewed        ED Medications  Medications   naloxone (FOR EMS ONLY) [facility]) 2 MG/2ML injection 2 mg (0 mg Does not apply Given to EMS 7/21/19 0719)   sodium chloride 0 9 % bolus 1,000 mL (0 mL Intravenous Stopped 7/21/19 0932)   sodium chloride 0 9 % bolus 1,000 mL (0 mL Intravenous Stopped 7/21/19 1312)   sodium chloride 0 9 % bolus 1,000 mL (0 mL Intravenous Stopped 7/21/19 1409)       Diagnostic Studies  Results Reviewed     Procedure Component Value Units Date/Time    Rapid drug screen, urine [[id number]]  (Abnormal) Collected:  07/21/19 0952    Lab Status:  Final result Specimen:  Urine Updated:  07/21/19 1006     Amph/Meth UR Negative     Barbiturate Ur Negative     Benzodiazepine Urine Positive     Cocaine Urine Negative     Methadone Urine Negative     Opiate Urine Positive     PCP Ur Negative     THC Urine Positive    Narrative:       Presumptive report  If requested, specimen will be sent to reference lab for confirmation  FOR MEDICAL PURPOSES ONLY  IF CONFIRMATION NEEDED PLEASE CONTACT THE LAB WITHIN 5 DAYS      Drug Screen Cutoff Levels:  AMPHETAMINE/METHAMPHETAMINES  1000 ng/mL  BARBITURATES     200 ng/mL  BENZODIAZEPINES     200 ng/mL  COCAINE      300 ng/mL  METHADONE      300 ng/mL  OPIATES      300 ng/mL  PHENCYCLIDINE     25 ng/mL  THC       50 ng/mL      Comprehensive metabolic panel [[id number]]  (Abnormal) Collected:  07/21/19 0726    Lab Status:  Final result Specimen:  Blood from Arm, Left Updated:  07/21/19 0842     Sodium 142 mmol/L      Potassium 4 3 mmol/L      Chloride 108 mmol/L      CO2 27 mmol/L      ANION GAP 7 mmol/L      BUN 21 mg/dL      Creatinine 1 04 mg/dL      Glucose 119 mg/dL      Calcium 9 6 mg/dL      AST 61 U/L       U/L      Alkaline Phosphatase 111 U/L      Total Protein 7 2 g/dL      Albumin 4 1 g/dL      Total Bilirubin 0 22 mg/dL      eGFR 97 ml/min/1 73sq m     Narrative:       [place] guidelines for Chronic Kidney Disease (CKD):     Stage 1 with normal or high GFR (GFR > 90 mL/min/1 73 square meters)    Stage 2 Mild CKD (GFR = 60-89 mL/min/1 73 square meters)    Stage 3A Moderate CKD (GFR = 45-59 mL/min/1 73 square meters)    Stage 3B Moderate CKD (GFR = 30-44 mL/min/1 73 square meters)    Stage 4 Severe CKD (GFR = 15-29 mL/min/1 73 square meters)    Stage 5 End Stage CKD (GFR <15 mL/min/1 73 square meters)  Note: GFR calculation is accurate only with a steady state creatinine    Salicylate level [[id number]]  (Abnormal) Collected:  07/21/19 0726    Lab Status:  Final result Specimen:  Blood from Arm, Left Updated:  19/86/27 5967     Salicylate Lvl <3 mg/dL     Acetaminophen level-If concentration is detectable, please discuss with medical  on call   [[id number]]  (Abnormal) Collected:  07/21/19 0726    Lab Status:  Final result Specimen:  Blood from Arm, Left Updated:  07/21/19 0805     Acetaminophen Level <2 ug/mL     Ethanol [[id number]]  (Normal) Collected:  07/21/19 0726    Lab Status:  Final result Specimen:  Blood from Arm, Left Updated:  07/21/19 0748     Ethanol Lvl <3 mg/dL     POCT alcohol breath test [[id number]]  (Normal) Resulted:  07/21/19 0741    Lab Status:  Final result Updated:  07/21/19 0741     EXTBreath Alcohol 0 00                 No orders to display         Procedures  ECG 12 Lead Documentation Only  Date/Time: 7/21/2019 4:23 PM  Performed by: Santo Swan MD  Authorized by: Santo Swan MD     Indications / Diagnosis:  Overdose  ECG reviewed by me, the ED Provider: yes    Patient location:  ED  Previous ECG:     Previous ECG:  Compared to current    Similarity:  No change  Interpretation:     Interpretation: abnormal    Rate:     ECG rate:  127    ECG rate assessment: tachycardic    Rhythm:     Rhythm: sinus tachycardia    Ectopy:     Ectopy: none    QRS:     QRS axis:  Normal  Conduction:     Conduction: normal    ST segments:     ST segments:  Normal  T waves:     T waves: non-specific    Q waves:     Q waves:  III and aVF            ED Course MDM  Number of Diagnoses or Management Options  Overdose of benzodiazepine, accidental or unintentional, initial encounter:   Diagnosis management comments: Patient is a 60-year-old male with history of drug abuse, diabetes, depression who presents for evaluation following an overdose  Exam shows slurred speech, mydiasis, profuse diaphoresis, tachycardia, and drowsiness  Treated with IV fluids and observed until clinically improved  Case discussed with Toxicology, who feel that clinical picture is consistent with combined Xanax intoxication and narcotic withdrawal  Patient continues denies suicide attempt  No indication for involuntary admission  Discharged with return precautions  Disposition  Final diagnoses:   Overdose of benzodiazepine, accidental or unintentional, initial encounter     Time reflects when diagnosis was documented in both MDM as applicable and the Disposition within this note     Time User Action Codes Description Comment    7/21/2019  1:35 PM Brittney Lau Add [[id number] [id number]] Overdose of benzodiazepine, accidental or unintentional, initial encounter       ED Disposition     ED Disposition Condition Date/Time Comment    Discharge Stable Sun Jul 21, 2019  1:35 PM Elisabeth Gagnon discharge to home/self care              Follow-up Information     Follow up With Specialties Details Why Contact Info Additional [address] Emergency Department Emergency Medicine  As needed, If symptoms worsen [address] [facility]  [phone]  ED, [address], [place], [place], [address]          Discharge Medication List as of 7/21/2019  1:36 PM      CONTINUE these medications which have NOT CHANGED    Details   albuterol (2 5 mg/3 mL) 0 083 % nebulizer solution Take 2 5 mg by nebulization as needed for wheezing or shortness of breath, Historical Med      !! albuterol (PROVENTIL HFA,VENTOLIN HFA) 90 mcg/act inhaler Inhale 2 puffs every 6 (six) hours as needed for wheezing, Starting Tue 12/18/2018, Print      ARIPiprazole (ABILIFY) 5 mg tablet Take 1 tablet (5 mg total) by mouth daily At 9am, Starting Wed 12/19/2018, Print      carvedilol (COREG) 6 25 mg tablet Take 1 tablet (6 25 mg total) by mouth 2 (two) times a day with meals At breakfast and dinner, Starting Tue 12/18/2018, Print      fluticasone-salmeterol (ADVAIR HFA) 45-21 MCG/ACT inhaler Inhale 2 puffs 2 (two) times a day Rinse mouth after use , Starting Tue 12/18/2018, Print      hydrochlorothiazide (HYDRODIURIL) 25 mg tablet Take 1 tablet (25 mg total) by mouth 2 (two) times a day At 9am and 6pm, Starting Tue 12/18/2018, Print      levothyroxine 25 mcg tablet Take 1 tablet (25 mcg total) by mouth daily in the early morning, Starting Wed 12/19/2018, Print      lisinopril (ZESTRIL) 20 mg tablet Take 1 tablet (20 mg total) by mouth 2 (two) times a day At 9am and 6pm, Starting Tue 12/18/2018, Print      LORazepam (ATIVAN) 1 mg tablet Take 1 tablet (1 mg total) by mouth 2 (two) times a day At 9am and 6pm, Starting Tue 12/18/2018, Print      metFORMIN (GLUCOPHAGE) 500 mg tablet Take 1 tablet (500 mg total) by mouth 2 (two) times a day with meals With breakfast and dinner, Starting Tue 12/18/2018, Print      montelukast (SINGULAIR) 10 mg tablet Take 1 tablet (10 mg total) by mouth daily at bedtime, Starting Tue 12/18/2018, Print      omeprazole (PriLOSEC) 20 mg delayed release capsule Take 1 capsule (20 mg total) by mouth daily Before breakfast, Starting Tue 12/18/2018, Print      sertraline (ZOLOFT) 100 mg tablet Take 1 tablet (100 mg total) by mouth daily At 9am, Starting Wed 12/19/2018, Print      zolpidem (AMBIEN) 10 mg tablet Take 1 tablet (10 mg total) by mouth daily at bedtime as needed for sleep, Starting Tue 12/18/2018, Print      albuterol (5 mg/mL) 0 5 % nebulizer solution Take 0 5 mL (2 5 mg total) by nebulization every 6 (six) hours as needed for wheezing, Starting Wed 2/27/2019, Print      !! albuterol (PROVENTIL HFA,VENTOLIN HFA) 90 mcg/act inhaler Inhale 2 puffs every 4 (four) hours as needed for wheezing, Starting Wed 2/27/2019, Print       !! - Potential duplicate medications found  Please discuss with provider  No discharge procedures on file  ED Provider  Attending physically available and evaluated Aida Perez I managed the patient along with the ED Attending      Electronically Signed by         Lu Pallas, MD  07/26/19 6310

## 2019-07-21 NOTE — ED NOTES
Police at patients bedside to escort to [facility]        Uma Crawford RN  07/21/19 7014

## 2019-07-21 NOTE — CONSULTS
INTERPROFESSIONAL (PHONE) Rachna Levi Toxicology  Alka Sánchez 34 y o  male MRN: [id number]  Unit/Bed#: ED 11 Encounter: [id number]      Reason for Consult / Principal Problem: overdose  Inpatient consult to Toxicology  Consult performed by: Jemma Paulino DO  Consult ordered by: Delmis Mahmood DO        07/21/19      ASSESSMENT:  34year-old male with opioid withdrawal  1  Opioid withdrawal  2  Opiate abuse  3  Benzodiazepine abuse  4  Cannabis abuse  5  Mild transaminitis - similar and improved from prior assessments      RECOMMENDATIONS:  Presentation consistent with opiate withdrawal, including tachycardia, diaphoresis, mydriasis, rhinorrhea, and somnolence  Withdrawal possibly induced by prehospital naloxone  Naloxone is indicated only for respiratory depression  Alprazolam abuse likely contributing to somnolence but potentially providing some relief of his withdrawal symptoms  Please provide supportive care and hydration, and monitor until mental status normal with improved vital signs  Please screen for opioid use disorder treatment  For further questions, please contact the medical  on call via [place] [employer] or throughl the [employer] or Patient [employer]  Please see additional teaching note below:    Hx and PE limited by the dynamics of a phone consultation  I have not personally interviewed or evaluated the patient, but only advised based on the information provided to me  Primary provider is responsible for all clinical decisions  Pertinent history, physical exam and clinical findings and course discussed: Alka Sánchez is a 34y o  year old male who presents with report of abusing alprazolam after being found with decreased mental status  He had no respiratory depression for EMS, but given naloxone  Upon arrival in ED was tachycardic, diaphoretic, with rhinorrhea, mydriasis and somnolence       Review of systems and physical exam not performed by me  Historical Information   Past Medical History:   Diagnosis Date    Anxiety     Asthma     COPD [facility]chronic obstructive pulmonary disease) ([facility] [address]     Depression     [facility]     Disease of thyroid gland     Hypertension     IBS (irritable bowel syndrome)     Psychiatric disorder     Bipolar Disorder     Past Surgical History:   Procedure Laterality Date    KNEE SURGERY Right     TONSILLECTOMY       Social History   Social History     Substance and Sexual Activity   Alcohol Use No    Comment: denies      Social History     Substance and Sexual Activity   Drug Use Yes    Types: MDMA (ecstacy), Marijuana, Methamphetamines, Cocaine    Comment: reports recent use (overdose) only      Social History     Tobacco Use   Smoking Status Former Smoker    Types: Cigarettes    Last attempt to quit: 10/1/2018    Years since quittin 8   Smokeless Tobacco Never Used     Family History   Problem Relation Age of Onset    Anxiety disorder Mother     Depression Mother     Bipolar disorder Mother     Anxiety disorder Maternal Aunt     Anxiety disorder Maternal Grandmother         Prior to Admission medications    Medication Sig Start Date End Date Taking? Authorizing Provider   albuterol (2 5 mg/3 mL) 0 083 % nebulizer solution Take 2 5 mg by nebulization as needed for wheezing or shortness of breath   Yes Historical Provider, MD   albuterol (PROVENTIL HFA,VENTOLIN HFA) 90 mcg/act inhaler Inhale 2 puffs every 6 (six) hours as needed for wheezing 18  Yes Anirudh Novak PA-C   ARIPiprazole (ABILIFY) 5 mg tablet Take 1 tablet (5 mg total) by mouth daily At 9am 18  Yes Anirudh Novak PA-C   carvedilol (COREG) 6 25 mg tablet Take 1 tablet (6 25 mg total) by mouth 2 (two) times a day with meals At breakfast and dinner 18  Yes Anirudh Novak PA-C   fluticasone-salmeterol (ADVAIR HFA) 73-80 MCG/ACT inhaler Inhale 2 puffs 2 (two) times a day Rinse mouth after use  12/18/18  Yes Rodríguez Rosas PA-C   hydrochlorothiazide (HYDRODIURIL) 25 mg tablet Take 1 tablet (25 mg total) by mouth 2 (two) times a day At 9am and 6pm 12/18/18  Yes Rodríguez Rosas PA-C   levothyroxine 25 mcg tablet Take 1 tablet (25 mcg total) by mouth daily in the early morning 12/19/18  Yes Rodríguez Rosas PA-C   lisinopril (ZESTRIL) 20 mg tablet Take 1 tablet (20 mg total) by mouth 2 (two) times a day At 9am and 6pm 12/18/18  Yes Rodríguez Rosas PA-C   LORazepam (ATIVAN) 1 mg tablet Take 1 tablet (1 mg total) by mouth 2 (two) times a day At 9am and 6pm 12/18/18  Yes Rodríguez Rosas PA-C   metFORMIN (GLUCOPHAGE) 500 mg tablet Take 1 tablet (500 mg total) by mouth 2 (two) times a day with meals With breakfast and dinner 12/18/18  Yes Rodríguez Rosas PA-C   montelukast (SINGULAIR) 10 mg tablet Take 1 tablet (10 mg total) by mouth daily at bedtime 12/18/18  Yes Rodríguez Rosas PA-C   omeprazole (PriLOSEC) 20 mg delayed release capsule Take 1 capsule (20 mg total) by mouth daily Before breakfast 12/18/18  Yes Rodríguez Rosas PA-C   sertraline (ZOLOFT) 100 mg tablet Take 1 tablet (100 mg total) by mouth daily At 9am 12/19/18  Yes Rodríguez Rosas PA-C   zolpidem (AMBIEN) 10 mg tablet Take 1 tablet (10 mg total) by mouth daily at bedtime as needed for sleep 12/18/18  Yes Rodríguez Rosas PA-C   albuterol (5 mg/mL) 0 5 % nebulizer solution Take 0 5 mL (2 5 mg total) by nebulization every 6 (six) hours as needed for wheezing 2/27/19   Haider Sheryl, DO   albuterol (PROVENTIL HFA,VENTOLIN HFA) 90 mcg/act inhaler Inhale 2 puffs every 4 (four) hours as needed for wheezing 2/27/19   Kimball Sheryl, DO       Current Facility-Administered Medications   Medication Dose Route Frequency    sodium chloride 0 9 % bolus 1,000 mL  1,000 mL Intravenous Once       Allergies   Allergen Reactions    Pollen Extract        Objective     No intake or output data in the 24 hours ending 07/21/19 1157    Invasive Devices:   Peripheral IV 07/21/19 Left Antecubital (Active)       Vitals   Vitals:    07/21/19 0830 07/21/19 0900 07/21/19 1007 07/21/19 1100   BP: 163/83 170/79 154/79 (!) 185/64   TempSrc:       Pulse: (!) 124 (!) 120 (!) 122 (!) 120   Resp: 18 18  18   Patient Position - Orthostatic VS: Lying Lying Lying Lying   Temp:             EKG, Pathology, and/or Other Studies: normal ECG per ED physician       Lab Results: I have personally reviewed pertinent reports  Labs: Invalid input(s):  EOSPCT   Results from last 7 days   Lab Units 07/21/19  0726   SODIUM mmol/L 142   POTASSIUM mmol/L 4 3   CHLORIDE mmol/L 108   CO2 mmol/L 27   BUN mg/dL 21   CREATININE mg/dL 1 04   CALCIUM mg/dL 9 6   ALK PHOS U/L 111   ALT U/L 101*   AST U/L 61*              0   Lab Value Date/Time    TROPONINI <0 02 02/27/2019 0522    TROPONINI <0 02 12/13/2018 1341    TROPONINI <0 02 12/13/2018 1131         Results from last 7 days   Lab Units 07/21/19  0726   ACETAMINOPHEN LVL ug/mL <2*   ETHANOL LVL mg/dL <3   SALICYLATE LVL mg/dL <3*       Counseling / Coordination of Care  Total time spent today 36 minutes  This was a phone consultation

## 2019-07-21 NOTE — ED NOTES
[place] [employer] at patients bedside  Reports patient has active warrants out for arrest  Would like to be called when patient is discharged  Please [facility] [phone]       Fe Carlos RN  07/21/19 9396

## 2019-07-23 NOTE — ED ATTENDING ATTESTATION
Amelia Case, DO, saw and evaluated the patient  I have discussed the patient with the resident/non-physician practitioner and agree with the resident's/non-physician practitioner's findings, Plan of Care, and MDM as documented in the resident's/non-physician practitioner's note, except where noted  All available labs and Radiology studies were reviewed  I was present for key portions of any procedure(s) performed by the resident/non-physician practitioner and I was immediately available to provide assistance  At this point I agree with the current assessment done in the Emergency Department  I have conducted an independent evaluation of this patient a history and physical is as follows:    Patient is a 42-year-old male brought in by ambulance with report of abusing alprazolam after being found with a decreased mental status and decreased alertness  Patient was administered naloxone by EMS for concern of opiate overdose  Patient arrives and was found to be tachycardic, diaphoretic, he also had rhinorrhea and mydriasis  He denies any intent to harm self  He does state that he may have taken 1 or 2 more of his alprazolam for increased anxiety  Toxicology panel was negative for alcohol, salicylate, acetaminophen  Patient is tachycardic  Other labs reviewed  Patient was treated with IV fluids and observation  After several hours of observation in the emergency department and IV fluids, patient is awake alert and oriented  It is possible that the administration of Narcan by EMS put patient in and opiate withdrawal state  This is resolved and patient again denies SI or HI  Stable for discharge  Patient was offered an evaluation by crisis with a referral to our host program for alcohol and drug counseling  He declined        Critical Care Time  Procedures

## 2019-09-30 ENCOUNTER — HOSPITAL ENCOUNTER (EMERGENCY)
Facility: HOSPITAL | Age: 29
End: 2019-10-01
Attending: EMERGENCY MEDICINE | Admitting: EMERGENCY MEDICINE
Payer: COMMERCIAL

## 2019-09-30 ENCOUNTER — HOSPITAL ENCOUNTER (EMERGENCY)
Facility: HOSPITAL | Age: 29
Discharge: HOME | End: 2019-09-30
Attending: EMERGENCY MEDICINE | Admitting: EMERGENCY MEDICINE
Payer: COMMERCIAL

## 2019-09-30 VITALS
BODY MASS INDEX: 43.82 KG/M2 | DIASTOLIC BLOOD PRESSURE: 105 MMHG | SYSTOLIC BLOOD PRESSURE: 168 MMHG | WEIGHT: 313 LBS | HEIGHT: 71 IN | OXYGEN SATURATION: 94 % | HEART RATE: 110 BPM | RESPIRATION RATE: 18 BRPM | TEMPERATURE: 98.5 F

## 2019-09-30 DIAGNOSIS — F32.A DEPRESSION: ICD-10-CM

## 2019-09-30 DIAGNOSIS — I10 ESSENTIAL HYPERTENSION: Primary | ICD-10-CM

## 2019-09-30 DIAGNOSIS — R79.89 ELEVATED LFTS: ICD-10-CM

## 2019-09-30 DIAGNOSIS — F19.10 SUBSTANCE ABUSE (HCC): ICD-10-CM

## 2019-09-30 DIAGNOSIS — F41.9 ANXIETY: Primary | ICD-10-CM

## 2019-09-30 LAB
ALBUMIN SERPL BCP-MCNC: 3.8 G/DL (ref 3.5–5)
ALP SERPL-CCNC: 138 U/L (ref 46–116)
ALT SERPL W P-5'-P-CCNC: 229 U/L (ref 12–78)
ANION GAP SERPL CALCULATED.3IONS-SCNC: 5 MMOL/L (ref 4–13)
APAP SERPL-MCNC: <2 UG/ML (ref 10–20)
AST SERPL W P-5'-P-CCNC: 184 U/L (ref 5–45)
BASOPHILS # BLD AUTO: 0.04 THOUSANDS/ΜL (ref 0–0.1)
BASOPHILS NFR BLD AUTO: 0 % (ref 0–1)
BILIRUB SERPL-MCNC: 0.15 MG/DL (ref 0.2–1)
BUN SERPL-MCNC: 15 MG/DL (ref 5–25)
CALCIUM SERPL-MCNC: 9.1 MG/DL (ref 8.3–10.1)
CHLORIDE SERPL-SCNC: 107 MMOL/L (ref 100–108)
CO2 SERPL-SCNC: 29 MMOL/L (ref 21–32)
CREAT SERPL-MCNC: 0.87 MG/DL (ref 0.6–1.3)
EOSINOPHIL # BLD AUTO: 0.43 THOUSAND/ΜL (ref 0–0.61)
EOSINOPHIL NFR BLD AUTO: 5 % (ref 0–6)
ERYTHROCYTE [DISTWIDTH] IN BLOOD BY AUTOMATED COUNT: 14.1 % (ref 11.6–15.1)
ETHANOL EXG-MCNC: 0 MG/DL
ETHANOL SERPL-MCNC: <3 MG/DL (ref 0–3)
GFR SERPL CREATININE-BSD FRML MDRD: 117 ML/MIN/1.73SQ M
GLUCOSE SERPL-MCNC: 108 MG/DL (ref 65–140)
HCT VFR BLD AUTO: 39.2 % (ref 36.5–49.3)
HGB BLD-MCNC: 12.5 G/DL (ref 12–17)
IMM GRANULOCYTES # BLD AUTO: 0.06 THOUSAND/UL (ref 0–0.2)
IMM GRANULOCYTES NFR BLD AUTO: 1 % (ref 0–2)
LIPASE SERPL-CCNC: 85 U/L (ref 73–393)
LYMPHOCYTES # BLD AUTO: 1.42 THOUSANDS/ΜL (ref 0.6–4.47)
LYMPHOCYTES NFR BLD AUTO: 16 % (ref 14–44)
MCH RBC QN AUTO: 29.9 PG (ref 26.8–34.3)
MCHC RBC AUTO-ENTMCNC: 31.9 G/DL (ref 31.4–37.4)
MCV RBC AUTO: 94 FL (ref 82–98)
MONOCYTES # BLD AUTO: 0.44 THOUSAND/ΜL (ref 0.17–1.22)
MONOCYTES NFR BLD AUTO: 5 % (ref 4–12)
NEUTROPHILS # BLD AUTO: 6.56 THOUSANDS/ΜL (ref 1.85–7.62)
NEUTS SEG NFR BLD AUTO: 73 % (ref 43–75)
NRBC BLD AUTO-RTO: 0 /100 WBCS
PLATELET # BLD AUTO: 377 THOUSANDS/UL (ref 149–390)
PMV BLD AUTO: 10.5 FL (ref 8.9–12.7)
POTASSIUM SERPL-SCNC: 4.6 MMOL/L (ref 3.5–5.3)
PROT SERPL-MCNC: 7.6 G/DL (ref 6.4–8.2)
RBC # BLD AUTO: 4.18 MILLION/UL (ref 3.88–5.62)
SALICYLATES SERPL-MCNC: <3 MG/DL (ref 3–20)
SODIUM SERPL-SCNC: 141 MMOL/L (ref 136–145)
WBC # BLD AUTO: 8.95 THOUSAND/UL (ref 4.31–10.16)

## 2019-09-30 PROCEDURE — 80307 DRUG TEST PRSMV CHEM ANLYZR: CPT | Performed by: EMERGENCY MEDICINE

## 2019-09-30 PROCEDURE — 99285 EMERGENCY DEPT VISIT HI MDM: CPT | Performed by: EMERGENCY MEDICINE

## 2019-09-30 PROCEDURE — 93005 ELECTROCARDIOGRAM TRACING: CPT

## 2019-09-30 PROCEDURE — 99285 EMERGENCY DEPT VISIT HI MDM: CPT

## 2019-09-30 PROCEDURE — 80329 ANALGESICS NON-OPIOID 1 OR 2: CPT | Performed by: EMERGENCY MEDICINE

## 2019-09-30 PROCEDURE — 83690 ASSAY OF LIPASE: CPT | Performed by: EMERGENCY MEDICINE

## 2019-09-30 PROCEDURE — 96361 HYDRATE IV INFUSION ADD-ON: CPT

## 2019-09-30 PROCEDURE — 96360 HYDRATION IV INFUSION INIT: CPT

## 2019-09-30 PROCEDURE — 80053 COMPREHEN METABOLIC PANEL: CPT | Performed by: EMERGENCY MEDICINE

## 2019-09-30 PROCEDURE — 82075 ASSAY OF BREATH ETHANOL: CPT | Performed by: EMERGENCY MEDICINE

## 2019-09-30 PROCEDURE — 36415 COLL VENOUS BLD VENIPUNCTURE: CPT | Performed by: EMERGENCY MEDICINE

## 2019-09-30 PROCEDURE — 85025 COMPLETE CBC W/AUTO DIFF WBC: CPT | Performed by: EMERGENCY MEDICINE

## 2019-09-30 PROCEDURE — 80320 DRUG SCREEN QUANTALCOHOLS: CPT | Performed by: EMERGENCY MEDICINE

## 2019-09-30 RX ORDER — ALPRAZOLAM 0.25 MG/1
0.25 TABLET ORAL ONCE
Status: COMPLETED | OUTPATIENT
Start: 2019-09-30 | End: 2019-10-01

## 2019-09-30 RX ORDER — ALBUTEROL SULFATE 90 UG/1
2 AEROSOL, METERED RESPIRATORY (INHALATION) ONCE
Status: COMPLETED | OUTPATIENT
Start: 2019-09-30 | End: 2019-09-30

## 2019-09-30 RX ORDER — ALPRAZOLAM 0.25 MG/1
0.5 TABLET ORAL 2 TIMES DAILY PRN
Qty: 20 TABLET | Refills: 0 | Status: SHIPPED | OUTPATIENT
Start: 2019-09-30 | End: 2019-10-03 | Stop reason: HOSPADM

## 2019-09-30 RX ADMIN — ALBUTEROL SULFATE 2 PUFF: 90 AEROSOL, METERED RESPIRATORY (INHALATION) at 23:44

## 2019-09-30 RX ADMIN — SODIUM CHLORIDE 1000 ML: 0.9 INJECTION, SOLUTION INTRAVENOUS at 18:44

## 2019-09-30 NOTE — LETTER
Section I - General Information    Name of Patient: Ruby Marie                 : 1990    Medicare #:____________________  Transport Date: 10/01/19 (PCS is valid for round trips on this date and for all repetitive trips in the 60-day range as noted below )  Origin: [address]                                                         Destination:________________________________________________  Is the pt's stay covered under Medicare Part A (PPS/DRG)     (_) YES  (_) NO  Closest appropriate facility?  (_) YES  (_) NO  If no, why is transport to more distant facility required?________________________  If hosp-hosp transfer, describe services needed at 2nd facility not available at 1st facility? _________________________________  If hospice pt, is this transport related to pt's terminal illness? (_) YES (_) NO Describe____________________________________    Section II - Medical Necessity Questionnaire  Ambulance transportation is medically necessary only if other means of transport are contraindicated or would be potentially harmful to the patient  To meet this requirement, the patient must either be "bed confined" or suffer from a condition such that transport by means other than ambulance is contraindicated by the patient's condition   The following questions must be answered by the medical professional signing below for this form to be valid:    1)  Describe the MEDICAL CONDITION (physical and/or mental) of this patient AT [address] [place] [address] that requires the patient to be transported in an ambulance and why transport by other means is contraindicated by the patient's condition:__________________________________________________________________________________________________    2) Is the patient "bed confined" as defined below?     (_) YES  (_) NO  To be "be confined" the patient must satisfy all three of the following conditions: (1) unable to get up from bed without Assistance; AND (2) unable to ambulate; AND (3) unable to sit in a chair or wheelchair  3) Can this patient safely be transported by car or wheelchair [place] (i e , seated during transport without a medical attendant or monitoring)?   (_) YES  (_) NO    4) In addition to completing questions 1-3 above, please check any of the following conditions that apply*:  *Note: supporting documentation for any boxes checked must be maintained in the patient's medical records  (_)Contractures   (_)Non-Healed Fractures  (_)Patient is confused (_)Patient is comatose (_)Moderate/severe pain on movement (_)Danger to self/others  (_)IV meds/fluids required (_)Patient is combative(_)Need or possible need for restraints (_)DVT requires elevation of lower extremity  (_)Medical attendant required (_)Requires oxygen-unable to self administer (_)Special handling/isolation/infection control precautions required (_)Unable to tolerate seated position for time needed to transport (_)Hemodynamic monitoring required en route (_)Unable to sit in a chair or wheelchair due to decubitus ulcers or other wounds (_)Cardiac monitoring required en route (_)Morbid obesity requires additional personnel/equipment to safely handle patient (_)Orthopedic device (backboard, halo, pins, traction, brace, wedge, etc,) requiring special handling during transport (_)Other(specify)_______________________________________________    Section III - Signature of Physician or Healthcare Professional  I certify that the above information is true and correct based on my evaluation of this patient, and represent that the patient requires transport by ambulance and that other forms of transport are contraindicated   I understand that this information will be used by the Centers for Medicare and Medicaid Services (CMS) to support the determination of medical necessity for ambulance services, and I represent that I have personal knowledge of the patient's condition at time of transport  (_) If this box is checked, I also certify that the patient is physically or mentally incapable of signing the ambulance service's claim and that the institution with which I am affiliated has furnished care, services, or assistance to the patient  My signature below is made on behalf of the patient pursuant to 42 CFR §424 36(b)(4)  In accordance with 42 CFR §424 37, the specific reason(s) that the patient is physically or mentally incapable of signing the claim form is as follows: _________________________________________________________________________________________________________      Signature of Physician* or Healthcare Professional______________________________________________________________  Signature Date 10/01/19 (For scheduled repetitive transports, this form is not valid for transports performed more than 60 days after this date)    Printed Name & Credentials of Physician or Healthcare Professional (MD, DO, RN, etc )________________________________  *Form must be signed by patient's attending physician for scheduled, repetitive transports   For non-repetitive, unscheduled ambulance transports, if unable to obtain the signature of the attending physician, any of the following may sign (choose appropriate option below)  (_) Physician Assistant (_)  Clinical Nurse Specialist (_)  Registered Nurse  (_)  Nurse Practitioner  (_) Discharge Planner

## 2019-09-30 NOTE — ED PROVIDER NOTES
History  Chief Complaint   Patient presents with    Overdose - Accidental     EMS states pt was found less responsive by girlfriend, pt states he took 2 pills of his perscribed xanax, pt also admits to using heroin, however has not for the past couple days, denies any other drug use today   Depression     Pt recently lost a family member and states he is more depressed then usual     HPI  33 yo male with PMH DM, depression, anxiety, and drug use presents to the ED via EMS with concern for drug overdose today  Pt was reportedly found by his girlfriend to be somnolent  On EMS arrival pt was awake but drowsy  He admits to taking two 2mg xanax that he bought illegally on the street  Pt reports he was prescribed xanax several years ago, and more recently had been prescribed ativan, but has been unable to fill his prescriptions for several months due to insurance problems  Pt also reports snorting heroin yesterday  Denies cocaine, alcohol, or other drug use  Pt denies SI/HI  Reports he has been more depressed and anxious than usual due to insurance and recent family deaths and took the xanax to help his symptoms  States he has been unable to see his physician due to insurance problems  Also notes some generalized abdominal pain and nausea x weeks for which he is supposed to make a GI appointment  Prior to Admission Medications   Prescriptions Last Dose Informant Patient Reported? Taking?    ARIPiprazole (ABILIFY) 5 mg tablet Past Month at Unknown time  No Yes   Sig: Take 1 tablet (5 mg total) by mouth daily At 9am   LORazepam (ATIVAN) 1 mg tablet Past Month at Unknown time  No Yes   Sig: Take 1 tablet (1 mg total) by mouth 2 (two) times a day At 9am and 6pm   albuterol (2 5 mg/3 mL) 0 083 % nebulizer solution   Yes Yes   Sig: Take 2 5 mg by nebulization as needed for wheezing or shortness of breath   albuterol (5 mg/mL) 0 5 % nebulizer solution   No Yes   Sig: Take 0 5 mL (2 5 mg total) by nebulization every 6 (six) hours as needed for wheezing   albuterol (PROVENTIL HFA,VENTOLIN HFA) 90 mcg/act inhaler   No Yes   Sig: Inhale 2 puffs every 6 (six) hours as needed for wheezing   albuterol (PROVENTIL HFA,VENTOLIN HFA) 90 mcg/act inhaler   No Yes   Sig: Inhale 2 puffs every 4 (four) hours as needed for wheezing   carvedilol (COREG) 6 25 mg tablet Past Month at Unknown time  No Yes   Sig: Take 1 tablet (6 25 mg total) by mouth 2 (two) times a day with meals At breakfast and dinner   fluticasone-salmeterol (ADVAIR HFA) 45-21 MCG/ACT inhaler Past Month at Unknown time  No Yes   Sig: Inhale 2 puffs 2 (two) times a day Rinse mouth after use    hydrochlorothiazide (HYDRODIURIL) 25 mg tablet Past Month at Unknown time  No Yes   Sig: Take 1 tablet (25 mg total) by mouth 2 (two) times a day At 9am and 6pm   levothyroxine 25 mcg tablet Past Month at Unknown time  No Yes   Sig: Take 1 tablet (25 mcg total) by mouth daily in the early morning   lisinopril (ZESTRIL) 20 mg tablet Past Month at Unknown time  No Yes   Sig: Take 1 tablet (20 mg total) by mouth 2 (two) times a day At 9am and 6pm   metFORMIN (GLUCOPHAGE) 500 mg tablet Past Month at Unknown time  No Yes   Sig: Take 1 tablet (500 mg total) by mouth 2 (two) times a day with meals With breakfast and dinner   montelukast (SINGULAIR) 10 mg tablet Past Month at Unknown time  No Yes   Sig: Take 1 tablet (10 mg total) by mouth daily at bedtime   omeprazole (PriLOSEC) 20 mg delayed release capsule Past Month at Unknown time  No Yes   Sig: Take 1 capsule (20 mg total) by mouth daily Before breakfast   sertraline (ZOLOFT) 100 mg tablet Past Month at Unknown time  No Yes   Sig: Take 1 tablet (100 mg total) by mouth daily At 9am   zolpidem (AMBIEN) 10 mg tablet Past Month at Unknown time  No Yes   Sig: Take 1 tablet (10 mg total) by mouth daily at bedtime as needed for sleep      Facility-Administered Medications: None       Past Medical History:   Diagnosis Date    Anxiety     Asthma     COPD (chronic obstructive pulmonary disease) ([facility] [address] )     Depression     Diabetes mellitus (HCC)     Disease of thyroid gland     Hypertension     IBS (irritable bowel syndrome)     Psychiatric disorder     Bipolar Disorder       Past Surgical History:   Procedure Laterality Date    KNEE SURGERY Right     TONSILLECTOMY         Family History   Problem Relation Age of Onset    Anxiety disorder Mother     Depression Mother     Bipolar disorder Mother     Anxiety disorder Maternal Aunt     Anxiety disorder Maternal Grandmother      I have reviewed and agree with the history as documented  Social History     Tobacco Use    Smoking status: Smoker, Current Status Unknown     Packs/day: 1 00     Types: Cigarettes    Smokeless tobacco: Never Used   Substance Use Topics    Alcohol use: No     Comment: denies     Drug use: Yes     Types: MDMA (ecstacy), Marijuana, Methamphetamines, Cocaine     Comment: reports recent use (overdose) only         Review of Systems   Constitutional: Positive for fatigue  Negative for chills and fever  HENT: Negative for congestion, rhinorrhea and sore throat  Respiratory: Negative for cough and shortness of breath  Cardiovascular: Negative for chest pain and palpitations  Gastrointestinal: Positive for abdominal pain and nausea  Negative for vomiting  Genitourinary: Negative for dysuria and hematuria  Musculoskeletal: Negative for arthralgias and myalgias  Skin: Negative for rash  Neurological: Negative for dizziness, weakness, light-headedness, numbness and headaches  Psychiatric/Behavioral: Negative for self-injury and suicidal ideas  The patient is nervous/anxious  All other systems reviewed and are negative        Physical Exam  ED Triage Vitals [09/30/19 1802]   Temperature Pulse Respirations Blood Pressure SpO2   98 5 °F (36 9 °C) (!) 131 18 (!) 168/105 94 %      Temp Source Heart Rate Source Patient Position - Orthostatic VS BP Location FiO2 (%)   Oral Monitor Lying Left arm --      Pain Score       8             Orthostatic Vital Signs  Vitals:    09/30/19 1802 09/30/19 2016   BP: (!) 168/105    Pulse: (!) 131 (!) 110   Patient Position - Orthostatic VS: Lying        Physical Exam   Constitutional: He is oriented to person, place, and time  He appears well-developed and well-nourished  No distress  HENT:   Head: Normocephalic and atraumatic  Right Ear: External ear normal    Left Ear: External ear normal    Mouth/Throat: Oropharynx is clear and moist    Eyes: Pupils are equal, round, and reactive to light  Conjunctivae are normal    Neck: Normal range of motion  Neck supple  Cardiovascular: Normal rate, regular rhythm, normal heart sounds and intact distal pulses  Exam reveals no gallop and no friction rub  No murmur heard  Pulmonary/Chest: Effort normal and breath sounds normal  No respiratory distress  He has no wheezes  He has no rhonchi  He has no rales  Abdominal: Soft  Bowel sounds are normal  He exhibits no distension  There is tenderness (mild)  Musculoskeletal: Normal range of motion  Lymphadenopathy:     He has no cervical adenopathy  Neurological: He is alert and oriented to person, place, and time  He has normal strength  No cranial nerve deficit or sensory deficit  Skin: Skin is warm and dry  He is not diaphoretic         ED Medications  Medications   sodium chloride 0 9 % bolus 1,000 mL (0 mL Intravenous Stopped 9/30/19 2040)       Diagnostic Studies  Results Reviewed     Procedure Component Value Units Date/Time    Comprehensive metabolic panel [[id number]]  (Abnormal) Collected:  09/30/19 1843    Lab Status:  Final result Specimen:  Blood from Arm, Left Updated:  09/30/19 1924     Sodium 141 mmol/L      Potassium 4 6 mmol/L      Chloride 107 mmol/L      CO2 29 mmol/L      ANION GAP 5 mmol/L      BUN 15 mg/dL      Creatinine 0 87 mg/dL      Glucose 108 mg/dL      Calcium 9 1 mg/dL       U/L       U/L      Alkaline Phosphatase 138 U/L      Total Protein 7 6 g/dL      Albumin 3 8 g/dL      Total Bilirubin 0 15 mg/dL      eGFR 117 ml/min/1 73sq m     Narrative:       [place] guidelines for Chronic Kidney Disease (CKD):     Stage 1 with normal or high GFR (GFR > 90 mL/min/1 73 square meters)    Stage 2 Mild CKD (GFR = 60-89 mL/min/1 73 square meters)    Stage 3A Moderate CKD (GFR = 45-59 mL/min/1 73 square meters)    Stage 3B Moderate CKD (GFR = 30-44 mL/min/1 73 square meters)    Stage 4 Severe CKD (GFR = 15-29 mL/min/1 73 square meters)    Stage 5 End Stage CKD (GFR <15 mL/min/1 73 square meters)  Note: GFR calculation is accurate only with a steady state creatinine    Lipase [[id number]]  (Normal) Collected:  09/30/19 1843    Lab Status:  Final result Specimen:  Blood from Arm, Left Updated:  09/30/19 1924     Lipase 85 u/L     Salicylate level [[id number]]  (Abnormal) Collected:  09/30/19 1843    Lab Status:  Final result Specimen:  Blood from Arm, Left Updated:  05/62/84 3460     Salicylate Lvl <3 mg/dL     Acetaminophen level-If concentration is detectable, please discuss with medical  on call   [[id number]]  (Abnormal) Collected:  09/30/19 1843    Lab Status:  Final result Specimen:  Blood from Arm, Left Updated:  09/30/19 1924     Acetaminophen Level <2 ug/mL     Ethanol [[id number]]  (Normal) Collected:  09/30/19 1843    Lab Status:  Final result Specimen:  Blood from Arm, Left Updated:  09/30/19 1918     Ethanol Lvl <3 mg/dL     CBC and differential [[id number]] Collected:  09/30/19 1843    Lab Status:  Final result Specimen:  Blood from Arm, Left Updated:  09/30/19 1855     WBC 8 95 Thousand/uL      RBC 4 18 Million/uL      Hemoglobin 12 5 g/dL      Hematocrit 39 2 %      MCV 94 fL      MCH 29 9 pg      MCHC 31 9 g/dL      RDW 14 1 %      MPV 10 5 fL      Platelets 471 Thousands/uL      nRBC 0 /100 WBCs      Neutrophils Relative 73 %      Immat GRANS % 1 %      Lymphocytes Relative 16 %      Monocytes Relative 5 %      Eosinophils Relative 5 %      Basophils Relative 0 %      Neutrophils Absolute 6 56 Thousands/µL      Immature Grans Absolute 0 06 Thousand/uL      Lymphocytes Absolute 1 42 Thousands/µL      Monocytes Absolute 0 44 Thousand/µL      Eosinophils Absolute 0 43 Thousand/µL      Basophils Absolute 0 04 Thousands/µL     Rapid drug screen, urine [[id number]]     Lab Status:  No result Specimen:  Urine                  No orders to display         Procedures  ECG 12 Lead Documentation Only  Date/Time: 9/30/2019 7:47 PM  Performed by: Jonathan Aldana MD  Authorized by: Jnoathan Aldana MD     ECG reviewed by me, the ED Provider: yes    Patient location:  ED  Previous ECG:     Previous ECG:  Compared to current    Similarity:  No change  Interpretation:     Interpretation: non-specific    Rate:     ECG rate:  118    ECG rate assessment: tachycardic    Rhythm:     Rhythm: sinus rhythm    Ectopy:     Ectopy: none    QRS:     QRS axis:  Indeterminate  Conduction:     Conduction: normal    ST segments:     ST segments:  Non-specific    Elevation:  V1 and V2  T waves:     T waves: flattening and inverted      Flattening:  AVF    Inverted:  III            ED Course  ED Course as of Sep 30 2049   Mon Sep 30, 2019   2007 AST(!): 184   2009 Increased from previous  No RUQ tenderness  Will advised pt to follow up with PCP and GI     ALT(!): 229   2026 Notified that pt is declining to give urine sample and wants to leave to go to work  Crisis has evaluated and is giving resources for follow up  Will prescribe small supply of xanax and advise pt to follow up with mental health, PCP, and GI      2047 Sent patient's information to  so that she can call and follow up with him  MDM  Somnolent, tachycardiac, hypertensive, abdominal pain  Will get CBC, CMP, Lipase,  EKG, Coma panel, Urine drug screen to evaluate   Pt is denying SI, no indication for psychiatric inpatient at this time but will have crisis provide resources  Disposition  Final diagnoses:   Anxiety   Substance abuse ([facility] [address] )   Elevated LFTs     Time reflects when diagnosis was documented in both MDM as applicable and the Disposition within this note     Time User Action Codes Description Comment    9/30/2019  8:30 PM Castro Nones [F41 9] Anxiety     9/30/2019  8:30 PM Desirae Mena Add [F19 10] Substance abuse ([facility] [address] )     9/30/2019  8:33 PM Desirae Mena Add [R94 5] Elevated LFTs       ED Disposition     ED Disposition Condition Date/Time Comment    Discharge Stable Mon Sep 30, 2019  8:30 PM Sandeep Hernandez discharge to home/self care              Follow-up Information     Follow up With Specialties Details Why Contact Info    [employer]  Call  for an appointment with a new [phone]      Yuliana Jon MD Internal Medicine, Gastroenterology Schedule an appointment as soon as possible for a visit  gastroenterology [address] [place] [address]  [phone]            Discharge Medication List as of 9/30/2019  8:35 PM      START taking these medications    Details   ALPRAZolam (XANAX) 0 25 mg tablet Take 2 tablets (0 5 mg total) by mouth 2 (two) times a day as needed for anxiety, Starting Mon 9/30/2019, Print         CONTINUE these medications which have NOT CHANGED    Details   albuterol (2 5 mg/3 mL) 0 083 % nebulizer solution Take 2 5 mg by nebulization as needed for wheezing or shortness of breath, Historical Med      albuterol (5 mg/mL) 0 5 % nebulizer solution Take 0 5 mL (2 5 mg total) by nebulization every 6 (six) hours as needed for wheezing, Starting Wed 2/27/2019, Print      !! albuterol (PROVENTIL HFA,VENTOLIN HFA) 90 mcg/act inhaler Inhale 2 puffs every 6 (six) hours as needed for wheezing, Starting Tue 12/18/2018, Print      !! albuterol (PROVENTIL HFA,VENTOLIN HFA) 90 mcg/act inhaler Inhale 2 puffs every 4 (four) hours as needed for wheezing, Starting Wed 2/27/2019, Print      ARIPiprazole (ABILIFY) 5 mg tablet Take 1 tablet (5 mg total) by mouth daily At 9am, Starting Wed 12/19/2018, Print      carvedilol (COREG) 6 25 mg tablet Take 1 tablet (6 25 mg total) by mouth 2 (two) times a day with meals At breakfast and dinner, Starting Tue 12/18/2018, Print      fluticasone-salmeterol (ADVAIR HFA) 45-21 MCG/ACT inhaler Inhale 2 puffs 2 (two) times a day Rinse mouth after use , Starting Tue 12/18/2018, Print      hydrochlorothiazide (HYDRODIURIL) 25 mg tablet Take 1 tablet (25 mg total) by mouth 2 (two) times a day At 9am and 6pm, Starting Tue 12/18/2018, Print      levothyroxine 25 mcg tablet Take 1 tablet (25 mcg total) by mouth daily in the early morning, Starting Wed 12/19/2018, Print      lisinopril (ZESTRIL) 20 mg tablet Take 1 tablet (20 mg total) by mouth 2 (two) times a day At 9am and 6pm, Starting Tue 12/18/2018, Print      LORazepam (ATIVAN) 1 mg tablet Take 1 tablet (1 mg total) by mouth 2 (two) times a day At 9am and 6pm, Starting Tue 12/18/2018, Print      metFORMIN (GLUCOPHAGE) 500 mg tablet Take 1 tablet (500 mg total) by mouth 2 (two) times a day with meals With breakfast and dinner, Starting Tue 12/18/2018, Print      montelukast (SINGULAIR) 10 mg tablet Take 1 tablet (10 mg total) by mouth daily at bedtime, Starting Tue 12/18/2018, Print      omeprazole (PriLOSEC) 20 mg delayed release capsule Take 1 capsule (20 mg total) by mouth daily Before breakfast, Starting Tue 12/18/2018, Print      sertraline (ZOLOFT) 100 mg tablet Take 1 tablet (100 mg total) by mouth daily At 9am, Starting Wed 12/19/2018, Print      zolpidem (AMBIEN) 10 mg tablet Take 1 tablet (10 mg total) by mouth daily at bedtime as needed for sleep, Starting Tue 12/18/2018, Print       !! - Potential duplicate medications found  Please discuss with provider  No discharge procedures on file  ED Provider  Attending physically available and evaluated Fe sawyer the patient along with the ED Attending      Electronically Signed by         James Bowling MD  09/30/19 0504

## 2019-09-30 NOTE — ED ATTENDING ATTESTATION
9/30/2019  I, Elieser Kovacs MD, saw and evaluated the patient  I have discussed the patient with the resident/non-physician practitioner and agree with the resident's/non-physician practitioner's findings, Plan of Care, and MDM as documented in the resident's/non-physician practitioner's note, except where noted  All available labs and Radiology studies were reviewed  I was present for key portions of any procedure(s) performed by the resident/non-physician practitioner and I was immediately available to provide assistance  At this point I agree with the current assessment done in the Emergency Department  I have conducted an independent evaluation of this patient a history and physical is as follows:   the patient presents for complaints  Ingestion of 2 mg Xanax pills a brought off the street  He thinks there were 2 mg pills he had been on Xanax for many years since age 16 ran out of insurance and July and ran out of his prescriptions for Xanax at that time  Patient states he has been buying Xanax off the street ever since    The patient also uses heroin which he snorts periodically last use yesterday he never injects has had no fever no chills he is not suicidal he has been somewhat depressed he is not hallucinating no alcohol use   exam the patient is in no acute distress he is somewhat sleepy but easily aroused he can answer questions follow commands HEENT pupils are equal  But small they are reactive neck no JVD lungs clear heart mildly tachycardic at 1:10  Abdomen soft nontender skin no rash neurologic exam cranial nerves 2-12 intact motor 5/5 sensory grossly intact cerebellar testing normal   impression:  Ingestion of Xanax  ED Course         Critical Care Time  Procedures

## 2019-10-01 ENCOUNTER — HOSPITAL ENCOUNTER (INPATIENT)
Facility: HOSPITAL | Age: 29
LOS: 2 days | Discharge: HOME | DRG: 753 | End: 2019-10-03
Attending: PSYCHIATRY & NEUROLOGY | Admitting: PSYCHIATRY & NEUROLOGY
Payer: COMMERCIAL

## 2019-10-01 VITALS
RESPIRATION RATE: 16 BRPM | DIASTOLIC BLOOD PRESSURE: 92 MMHG | HEART RATE: 98 BPM | SYSTOLIC BLOOD PRESSURE: 155 MMHG | TEMPERATURE: 98.6 F | WEIGHT: 313 LBS | BODY MASS INDEX: 43.65 KG/M2 | OXYGEN SATURATION: 98 %

## 2019-10-01 DIAGNOSIS — F31.32 BIPOLAR AFFECTIVE DISORDER, CURRENTLY DEPRESSED, MODERATE (HCC): Primary | ICD-10-CM

## 2019-10-01 DIAGNOSIS — F41.1 GENERALIZED ANXIETY DISORDER: ICD-10-CM

## 2019-10-01 DIAGNOSIS — I10 ESSENTIAL HYPERTENSION: ICD-10-CM

## 2019-10-01 DIAGNOSIS — K21.00 REFLUX ESOPHAGITIS: ICD-10-CM

## 2019-10-01 DIAGNOSIS — J45.20 MILD INTERMITTENT ASTHMA WITHOUT COMPLICATION: ICD-10-CM

## 2019-10-01 DIAGNOSIS — E03.9 ACQUIRED HYPOTHYROIDISM: ICD-10-CM

## 2019-10-01 DIAGNOSIS — F17.200 SMOKING ADDICTION: ICD-10-CM

## 2019-10-01 LAB
AMPHETAMINES SERPL QL SCN: NEGATIVE
ATRIAL RATE: 118 BPM
BARBITURATES UR QL: POSITIVE
BENZODIAZ UR QL: POSITIVE
COCAINE UR QL: NEGATIVE
GLUCOSE SERPL-MCNC: 106 MG/DL (ref 65–140)
GLUCOSE SERPL-MCNC: 107 MG/DL (ref 65–140)
GLUCOSE SERPL-MCNC: 115 MG/DL (ref 65–140)
METHADONE UR QL: NEGATIVE
OPIATES UR QL SCN: POSITIVE
P AXIS: 50 DEGREES
PCP UR QL: NEGATIVE
PR INTERVAL: 126 MS
QRS AXIS: 5 DEGREES
QRSD INTERVAL: 98 MS
QT INTERVAL: 338 MS
QTC INTERVAL: 473 MS
T WAVE AXIS: 0 DEGREES
THC UR QL: NEGATIVE
VENTRICULAR RATE: 118 BPM

## 2019-10-01 PROCEDURE — 93010 ELECTROCARDIOGRAM REPORT: CPT | Performed by: INTERNAL MEDICINE

## 2019-10-01 PROCEDURE — 99231 SBSQ HOSP IP/OBS SF/LOW 25: CPT | Performed by: PSYCHIATRY & NEUROLOGY

## 2019-10-01 PROCEDURE — 99253 IP/OBS CNSLTJ NEW/EST LOW 45: CPT | Performed by: PHYSICIAN ASSISTANT

## 2019-10-01 PROCEDURE — 82948 REAGENT STRIP/BLOOD GLUCOSE: CPT

## 2019-10-01 RX ORDER — LORAZEPAM 2 MG/ML
2 INJECTION INTRAMUSCULAR EVERY 6 HOURS PRN
Status: CANCELLED | OUTPATIENT
Start: 2019-10-01

## 2019-10-01 RX ORDER — HALOPERIDOL 5 MG
5 TABLET ORAL EVERY 6 HOURS PRN
Status: CANCELLED | OUTPATIENT
Start: 2019-10-01

## 2019-10-01 RX ORDER — LORAZEPAM 2 MG/ML
1 INJECTION INTRAMUSCULAR EVERY 6 HOURS PRN
Status: DISCONTINUED | OUTPATIENT
Start: 2019-10-01 | End: 2019-10-03 | Stop reason: HOSPADM

## 2019-10-01 RX ORDER — HALOPERIDOL 5 MG
5 TABLET ORAL EVERY 6 HOURS PRN
Status: DISCONTINUED | OUTPATIENT
Start: 2019-10-01 | End: 2019-10-03 | Stop reason: HOSPADM

## 2019-10-01 RX ORDER — TRAZODONE HYDROCHLORIDE 50 MG/1
50 TABLET ORAL
Status: CANCELLED | OUTPATIENT
Start: 2019-10-01

## 2019-10-01 RX ORDER — HYDROCHLOROTHIAZIDE 12.5 MG/1
12.5 TABLET ORAL DAILY
Status: DISCONTINUED | OUTPATIENT
Start: 2019-10-02 | End: 2019-10-03 | Stop reason: HOSPADM

## 2019-10-01 RX ORDER — HALOPERIDOL 5 MG/ML
5 INJECTION INTRAMUSCULAR EVERY 6 HOURS PRN
Status: CANCELLED | OUTPATIENT
Start: 2019-10-01

## 2019-10-01 RX ORDER — ACETAMINOPHEN 325 MG/1
975 TABLET ORAL EVERY 6 HOURS PRN
Status: CANCELLED | OUTPATIENT
Start: 2019-10-01

## 2019-10-01 RX ORDER — MAGNESIUM HYDROXIDE/ALUMINUM HYDROXICE/SIMETHICONE 120; 1200; 1200 MG/30ML; MG/30ML; MG/30ML
30 SUSPENSION ORAL EVERY 4 HOURS PRN
Status: CANCELLED | OUTPATIENT
Start: 2019-10-01

## 2019-10-01 RX ORDER — BENZTROPINE MESYLATE 1 MG/1
1 TABLET ORAL EVERY 6 HOURS PRN
Status: CANCELLED | OUTPATIENT
Start: 2019-10-01

## 2019-10-01 RX ORDER — ACETAMINOPHEN 325 MG/1
650 TABLET ORAL EVERY 6 HOURS PRN
Status: CANCELLED | OUTPATIENT
Start: 2019-10-01

## 2019-10-01 RX ORDER — FLUTICASONE FUROATE AND VILANTEROL 100; 25 UG/1; UG/1
1 POWDER RESPIRATORY (INHALATION)
Status: DISCONTINUED | OUTPATIENT
Start: 2019-10-02 | End: 2019-10-03 | Stop reason: HOSPADM

## 2019-10-01 RX ORDER — ACETAMINOPHEN 325 MG/1
650 TABLET ORAL EVERY 6 HOURS PRN
Status: DISCONTINUED | OUTPATIENT
Start: 2019-10-01 | End: 2019-10-01

## 2019-10-01 RX ORDER — LORAZEPAM 0.5 MG/1
1 TABLET ORAL EVERY 6 HOURS PRN
Status: CANCELLED | OUTPATIENT
Start: 2019-10-01

## 2019-10-01 RX ORDER — TRAZODONE HYDROCHLORIDE 50 MG/1
50 TABLET ORAL
Status: DISCONTINUED | OUTPATIENT
Start: 2019-10-01 | End: 2019-10-03 | Stop reason: HOSPADM

## 2019-10-01 RX ORDER — PANTOPRAZOLE SODIUM 20 MG/1
20 TABLET, DELAYED RELEASE ORAL
Status: DISCONTINUED | OUTPATIENT
Start: 2019-10-02 | End: 2019-10-03 | Stop reason: HOSPADM

## 2019-10-01 RX ORDER — HALOPERIDOL 5 MG/ML
5 INJECTION INTRAMUSCULAR EVERY 6 HOURS PRN
Status: DISCONTINUED | OUTPATIENT
Start: 2019-10-01 | End: 2019-10-03 | Stop reason: HOSPADM

## 2019-10-01 RX ORDER — IBUPROFEN 400 MG/1
400 TABLET ORAL EVERY 6 HOURS PRN
Status: DISCONTINUED | OUTPATIENT
Start: 2019-10-01 | End: 2019-10-01

## 2019-10-01 RX ORDER — METHOCARBAMOL 500 MG/1
500 TABLET, FILM COATED ORAL EVERY 6 HOURS PRN
Status: DISCONTINUED | OUTPATIENT
Start: 2019-10-01 | End: 2019-10-01 | Stop reason: HOSPADM

## 2019-10-01 RX ORDER — ACETAMINOPHEN 325 MG/1
650 TABLET ORAL EVERY 4 HOURS PRN
Status: CANCELLED | OUTPATIENT
Start: 2019-10-01

## 2019-10-01 RX ORDER — ALBUTEROL SULFATE 90 UG/1
2 AEROSOL, METERED RESPIRATORY (INHALATION) EVERY 4 HOURS PRN
Status: CANCELLED | OUTPATIENT
Start: 2019-10-01

## 2019-10-01 RX ORDER — LORAZEPAM 2 MG/ML
2 INJECTION INTRAMUSCULAR EVERY 6 HOURS PRN
Status: DISCONTINUED | OUTPATIENT
Start: 2019-10-01 | End: 2019-10-03 | Stop reason: HOSPADM

## 2019-10-01 RX ORDER — LORAZEPAM 1 MG/1
1 TABLET ORAL EVERY 6 HOURS PRN
Status: DISCONTINUED | OUTPATIENT
Start: 2019-10-01 | End: 2019-10-03 | Stop reason: HOSPADM

## 2019-10-01 RX ORDER — NICOTINE 21 MG/24HR
1 PATCH, TRANSDERMAL 24 HOURS TRANSDERMAL DAILY
Status: DISCONTINUED | OUTPATIENT
Start: 2019-10-01 | End: 2019-10-03 | Stop reason: HOSPADM

## 2019-10-01 RX ORDER — LORAZEPAM 0.5 MG/1
1 TABLET ORAL ONCE
Status: COMPLETED | OUTPATIENT
Start: 2019-10-01 | End: 2019-10-01

## 2019-10-01 RX ORDER — ACETAMINOPHEN 325 MG/1
650 TABLET ORAL EVERY 4 HOURS PRN
Status: DISCONTINUED | OUTPATIENT
Start: 2019-10-01 | End: 2019-10-01

## 2019-10-01 RX ORDER — BENZTROPINE MESYLATE 1 MG/ML
1 INJECTION INTRAMUSCULAR; INTRAVENOUS EVERY 6 HOURS PRN
Status: DISCONTINUED | OUTPATIENT
Start: 2019-10-01 | End: 2019-10-03 | Stop reason: HOSPADM

## 2019-10-01 RX ORDER — CLONIDINE HYDROCHLORIDE 0.1 MG/1
0.1 TABLET ORAL EVERY 4 HOURS PRN
Status: DISCONTINUED | OUTPATIENT
Start: 2019-10-01 | End: 2019-10-01 | Stop reason: HOSPADM

## 2019-10-01 RX ORDER — MAGNESIUM HYDROXIDE/ALUMINUM HYDROXICE/SIMETHICONE 120; 1200; 1200 MG/30ML; MG/30ML; MG/30ML
30 SUSPENSION ORAL EVERY 4 HOURS PRN
Status: DISCONTINUED | OUTPATIENT
Start: 2019-10-01 | End: 2019-10-03 | Stop reason: HOSPADM

## 2019-10-01 RX ORDER — BENZTROPINE MESYLATE 1 MG/ML
1 INJECTION INTRAMUSCULAR; INTRAVENOUS EVERY 6 HOURS PRN
Status: CANCELLED | OUTPATIENT
Start: 2019-10-01

## 2019-10-01 RX ORDER — ACETAMINOPHEN 325 MG/1
975 TABLET ORAL EVERY 6 HOURS PRN
Status: DISCONTINUED | OUTPATIENT
Start: 2019-10-01 | End: 2019-10-01

## 2019-10-01 RX ORDER — ALBUTEROL SULFATE 90 UG/1
2 AEROSOL, METERED RESPIRATORY (INHALATION) EVERY 4 HOURS PRN
Status: DISCONTINUED | OUTPATIENT
Start: 2019-10-01 | End: 2019-10-03 | Stop reason: HOSPADM

## 2019-10-01 RX ORDER — ARIPIPRAZOLE 5 MG/1
5 TABLET ORAL DAILY
Status: DISCONTINUED | OUTPATIENT
Start: 2019-10-01 | End: 2019-10-03 | Stop reason: HOSPADM

## 2019-10-01 RX ORDER — NAPROXEN 500 MG/1
500 TABLET ORAL 2 TIMES DAILY PRN
Status: DISCONTINUED | OUTPATIENT
Start: 2019-10-01 | End: 2019-10-03 | Stop reason: HOSPADM

## 2019-10-01 RX ORDER — NICOTINE 21 MG/24HR
1 PATCH, TRANSDERMAL 24 HOURS TRANSDERMAL DAILY
Status: CANCELLED | OUTPATIENT
Start: 2019-10-01

## 2019-10-01 RX ORDER — DICYCLOMINE HYDROCHLORIDE 10 MG/1
10 CAPSULE ORAL EVERY 6 HOURS PRN
Status: DISCONTINUED | OUTPATIENT
Start: 2019-10-01 | End: 2019-10-01 | Stop reason: HOSPADM

## 2019-10-01 RX ORDER — LEVOTHYROXINE SODIUM 0.03 MG/1
25 TABLET ORAL
Status: DISCONTINUED | OUTPATIENT
Start: 2019-10-02 | End: 2019-10-03 | Stop reason: HOSPADM

## 2019-10-01 RX ORDER — LISINOPRIL 20 MG/1
20 TABLET ORAL DAILY
Status: DISCONTINUED | OUTPATIENT
Start: 2019-10-02 | End: 2019-10-03 | Stop reason: HOSPADM

## 2019-10-01 RX ORDER — CARVEDILOL 6.25 MG/1
6.25 TABLET ORAL 2 TIMES DAILY WITH MEALS
Status: DISCONTINUED | OUTPATIENT
Start: 2019-10-01 | End: 2019-10-03 | Stop reason: HOSPADM

## 2019-10-01 RX ORDER — BENZTROPINE MESYLATE 1 MG/1
1 TABLET ORAL EVERY 6 HOURS PRN
Status: DISCONTINUED | OUTPATIENT
Start: 2019-10-01 | End: 2019-10-03 | Stop reason: HOSPADM

## 2019-10-01 RX ADMIN — ALPRAZOLAM 0.25 MG: 0.25 TABLET ORAL at 00:21

## 2019-10-01 RX ADMIN — LORAZEPAM 1 MG: 0.5 TABLET ORAL at 02:27

## 2019-10-01 RX ADMIN — TRAZODONE HYDROCHLORIDE 50 MG: 50 TABLET ORAL at 20:52

## 2019-10-01 RX ADMIN — SERTRALINE HYDROCHLORIDE 50 MG: 50 TABLET ORAL at 15:45

## 2019-10-01 RX ADMIN — CARVEDILOL 6.25 MG: 6.25 TABLET, FILM COATED ORAL at 20:51

## 2019-10-01 RX ADMIN — ARIPIPRAZOLE 5 MG: 5 TABLET ORAL at 15:44

## 2019-10-01 RX ADMIN — LORAZEPAM 1 MG: 1 TABLET ORAL at 15:45
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
## 2019-10-01 NOTE — ASSESSMENT & PLAN NOTE
· Patient appears to have chronic, mild transaminitis however not as high as on presentation  · Suspect due to medication however will check acute hepatitis panel  · Discontinue Tylenol in favor of Motrin for p r n   Pain control  · Acetamide if in and salicylate levels negative  · Repeat LFTs

## 2019-10-01 NOTE — ED NOTES
Pt is a 34 y o  male who was brought to the ED after taking xanax that he bought of the street to help cope with his anxiety and depression  Patient was initially irritable to speak with a crisis worker as he expressed he does not have any suicidal/homicidal ideations  Patient reports that he has been off his medications for about a month because of lack of insurance  He reports that he makes too much to be eligible for MA, however also discussed that he just recently started his job and therefore is not eligible for insurance through them yet  Patient states that he has been overwhelmed with work, pending legal issues, the death of his cousin and lack of overall support  Patient relates a poor relationship with his family, which appears to make him very upset, but states that his girlfriend and her mother are good supports  Patient relates that he needs money to pay to attend [facility] for a DUI charge, however he doesn't have the money and is worried about that being on his record  Patient states that he has not been sleeping or eating well for at least a month  Patient denies auditory/visual hallucinations  Patient does report infrequent panic attacks but believes that he just needs to be back on his medications for things to get better  Patient does report having an appointment this week with his PCP and plans to discuss getting refills on his medications, however does not believe he would be comfortable doing that  Patient declined inpatient treatment at this time because he needs to be able to work  Patient was provided with outpatient mental health resource list and was encouraged to call [facility] in the morning to discuss available assistance that may be available for him at this time  Patient was agreeable with this plan and understands that he should return to the ED if symptoms progress or worsen       Chief Complaint   Patient presents with    Overdose - Accidental     EMS states pt was found less responsive by girlfriend, pt states he took 2 pills of his perscribed xanax, pt also admits to using heroin, however has not for the past couple days, denies any other drug use today       Depression     Pt recently lost a family member and states he is more depressed then usual     Intake Assessment completed, Safety risk Assessment completed    [facility]  09/30/19   0398

## 2019-10-01 NOTE — NURSING NOTE
Patient visible in the unit   Denies suicidal ideation  Continues to be depressed   CIWA SCORE AT 0494 AT 3  Patient requested ativan for anxiety 3/4 at 1545  Not noted any withdrawal symptoms

## 2019-10-01 NOTE — ED ATTENDING ATTESTATION
9/30/2019  IChanda MD, saw and evaluated the patient  I have discussed the patient with the resident/non-physician practitioner and agree with the resident's/non-physician practitioner's findings, Plan of Care, and MDM as documented in the resident's/non-physician practitioner's note, except where noted  All available labs and Radiology studies were reviewed  I was present for key portions of any procedure(s) performed by the resident/non-physician practitioner and I was immediately available to provide assistance  At this point I agree with the current assessment done in the Emergency Department    I have conducted an independent evaluation of this patient a history and physical is as follows:   patient was seen and recently discharged after taking 2 mg of Xanax that he bought off the street the patient was seen by crisis   the patient also uses intranasal heroin on occasion the patient was seen by crisis he was not suicidal or homicidal he was given outpatient referrals the patient was also given a small prescription for Xanax which she did not fill as the patient has been on Xanax since he has been 16years old   patient apparently went home and he took 10 mg of morphine that he had left over from the surgery   the patient is tearful and sad and depressed   he will contact crisis and have him re-evaluated likely admission and  depression substance issues  ED Course         Critical Care Time  Procedures

## 2019-10-01 NOTE — CONSULTS
Telesychiatry Telephone Admission Note    I was consulted by phone to review the case of this 19yo man with history of opioid/benzo/cannabis use medically cleared and admitted for depression  Patient seen earlier in ED for reported Xanax overdose and also used morphine  PMH significant for HTN, hypothyroidism, DM2, obesity  NKDA  Allergy to pollen  Patient hypertensive, tachycardic, though downtrending  Reassuring admission labs apart from transaminitis  UDS+barbiturates/benzo/opiate  BAL negative  Plan:   --Admit to psychiatry under voluntary status  --Suicide precautions  --Routine admission orders placed  --Detox protocol for possible alcohol/opiate withdrawal     --FSG BID  Albuterol inhaler PRN  Medical consult to o/w advise on medical management

## 2019-10-01 NOTE — CONSULTS
Consult- Fatmata Ayala 1990, 34 y o  male MRN: [id number]  Unit/Bed#: [facility] 384-02 Encounter: [id number]  Primary Care Provider: No primary care provider on file  Date and time admitted to hospital: 10/1/2019  9:17 AM  Inpatient consult for Medical Clearance for [address] patient  Consult performed by: Mary Felix PA-C  Consult ordered by: Unique Ramirez MD        * Bipolar affective disorder, currently depressed, moderate ([facility] )  Assessment & Plan  Management per primary  We are consulted for medical management  See plans for patient's medical conditions  Reviewed lab work, EKG, medications    [facility]  Assessment & Plan  UDS positive for barbiturates, benzos, opiates  Monitor for withdrawal  Avoid narcotic medication    Type 2 diabetes mellitus without complication, without long-term current use of insulin [facility])  Assessment & Plan  Lab Results   Component Value Date    HGBA1C 8 1 (H) 12/12/2018       Recent Labs     10/01/19  1235   POCGLU 106       Blood Sugar Average: Last 72 hrs:  (P) 106   Patient takes metformin  Check hemoglobin A1c  Insulin sliding scale    Transaminitis  Assessment & Plan  · Patient appears to have chronic, mild transaminitis however not as high as on presentation  · Suspect due to medication however will check acute hepatitis panel  · Discontinue Tylenol in favor of Motrin for p r n  Pain control  · Acetamide if in and salicylate levels negative  · Repeat LFTs    Acquired hypothyroidism  Assessment & Plan  Continue levothyroxine 25 mcg daily  Check TSH    Morbid obesity due to excess calories [facility])  Assessment & Plan  Would benefit from weight loss    Generalized anxiety disorder  Assessment & Plan  Management per primary    Sinus tachycardia  Assessment & Plan  On EKG  Patient does have anxiety    Consider up titrating carvedilol and discontinuing hydrochlorothiazide    Asthma  Assessment & Plan  Advair formulary equivalent, albuterol  No acute exacerbation at this time    Essential hypertension  Assessment & Plan  Patient takes hydrochlorothiazide, lisinopril, carvedilol        VTE Prophylaxis: Reason for no pharmacologic prophylaxis Low risk, ambulate  / reason for no mechanical VTE prophylaxis Low risk, ambulate     Recommendations for Discharge:  · None    Counseling / Coordination of Care Time: 45 minutes  Greater than 50% of total time spent on patient counseling and coordination of care  Collaboration of Care: Were Recommendations Directly Discussed with Primary Treatment Team? - No     History of Present Illness:    Hammad Espinosa is a 34 y o  male past medical history of diabetes type 2, hypertension, GERD, hypothyroid, asthma who is originally admitted to the [facility] service due to anxiety  We are consulted for medical management  Patient maintained O2 0 1 commitment  He denies history of seizure  He denies recent sicknesses  He denies recent medication changes  He has no medical complaints at this time  He states he smokes about 1 cigarette per week and is not requesting nicotine replacement  He denies abdominal pain or history of hepatitis  Review of Systems:    Review of Systems   Constitutional: Negative for activity change, appetite change, chills, fatigue and fever  HENT: Negative for congestion, rhinorrhea, sinus pressure, sinus pain and sore throat  Eyes: Negative for discharge and visual disturbance  Respiratory: Negative for cough, chest tightness, shortness of breath, wheezing and stridor  Cardiovascular: Negative for chest pain and palpitations  Gastrointestinal: Negative for abdominal distention, abdominal pain, constipation, diarrhea, nausea and vomiting  Endocrine: Negative for cold intolerance and heat intolerance  Genitourinary: Negative for difficulty urinating  Musculoskeletal: Negative for arthralgias, back pain and joint swelling  Skin: Negative for color change, pallor and rash     Allergic/Immunologic: Negative for environmental allergies and food allergies  Neurological: Negative for dizziness, syncope, facial asymmetry, speech difficulty, weakness, light-headedness, numbness and headaches  Psychiatric/Behavioral: Negative for agitation, behavioral problems and confusion  Past Medical and Surgical History:     Past Medical History:   Diagnosis Date    Anxiety     Asthma     COPD [facility]chronic obstructive [facility] disease) [facility] )     Depression     Diabetes mellitus ([place] [address] )     Disease of thyroid gland     Hypertension     IBS (irritable bowel syndrome)     Psychiatric disorder     Bipolar Disorder    Psychiatric illness        Past Surgical History:   Procedure Laterality Date    KNEE SURGERY Right     TONSILLECTOMY         Meds/Allergies:    all medications and allergies reviewed    Allergies: Allergies   Allergen Reactions    Pollen Extract        Social History:     Marital Status: Single    Substance Use History:   Social History     Substance and Sexual Activity   Alcohol Use No    Comment: denies      Social History     Tobacco Use   Smoking Status Smoker, Current Status Unknown    Packs/day: 1 00    Types: Cigarettes   Smokeless Tobacco Never Used     Social History     Substance and Sexual Activity   Drug Use Yes    Types: MDMA (ecstacy), Marijuana, Methamphetamines, Cocaine    Comment: reports recent use (overdose) only        Family History:    non-contributory    Physical Exam:     Vitals:   Blood Pressure: 137/98 (10/01/19 1530)  Pulse: 88 (10/01/19 1530)  Temperature: 97 7 °F (36 5 °C) (10/01/19 0917)  Temp Source: Temporal (10/01/19 0917)  Respirations: 16 (10/01/19 0917)  Weight - Scale: 125 kg (276 lb) (10/01/19 0917)  SpO2: 98 % (10/01/19 0917)    Physical Exam   Constitutional: He is oriented to person, place, and time  He appears well-developed and well-nourished  No distress  Patient is pleasant and interactive  HENT:   Head: Normocephalic and atraumatic  Mouth/Throat: No oropharyngeal exudate  Eyes: Right eye exhibits no discharge  Left eye exhibits no discharge  No scleral icterus  Neck: No JVD present  No tracheal deviation present  No thyromegaly present  Cardiovascular: Regular rhythm  Exam reveals no gallop and no friction rub  No murmur heard  Pulmonary/Chest: Effort normal and breath sounds normal  No respiratory distress  He has no wheezes  He has no rales  He exhibits no tenderness  Abdominal: Soft  Bowel sounds are normal  He exhibits no distension  There is no tenderness  There is no rebound  Obese abdomen   Musculoskeletal: He exhibits no edema, tenderness or deformity  Neurological: He is alert and oriented to person, place, and time  No cranial nerve deficit (Nonfocal neurological exam   Cranial nerves 2-12 normal )  Skin: Skin is warm and dry  He is not diaphoretic  No erythema  No pallor  Tattoos   Psychiatric: He has a normal mood and affect  His behavior is normal    Nursing note and vitals reviewed  Additional Data:     Lab Results: I have personally reviewed pertinent reports  Results from last 7 days   Lab Units 09/30/19  1843   WBC Thousand/uL 8 95   HEMOGLOBIN g/dL 12 5   HEMATOCRIT % 39 2   PLATELETS Thousands/uL 377   NEUTROS PCT % 73   LYMPHS PCT % 16   MONOS PCT % 5   EOS PCT % 5     Results from last 7 days   Lab Units 09/30/19  1843   SODIUM mmol/L 141   POTASSIUM mmol/L 4 6   CHLORIDE mmol/L 107   CO2 mmol/L 29   BUN mg/dL 15   CREATININE mg/dL 0 87   ANION GAP mmol/L 5   CALCIUM mg/dL 9 1   ALBUMIN g/dL 3 8   TOTAL BILIRUBIN mg/dL 0 15*   ALK PHOS U/L 138*   ALT U/L 229*   AST U/L 184*   GLUCOSE RANDOM mg/dL 108             Lab Results   Component Value Date/Time    HGBA1C 8 1 (H) 12/12/2018 06:34 AM     Results from last 7 days   Lab Units 10/01/19  1235   POC GLUCOSE mg/dl 106           Imaging: I have personally reviewed pertinent reports        No orders to display       EKG, Pathology, and Other Studies Reviewed on Admission:   · EKG:  Reviewed, sinus tach    ** Please Note: This note has been constructed using a voice recognition system   **

## 2019-10-01 NOTE — ED NOTES
Tray coming over  Pt made aware        [address] [facility] WILDER Jhaveri  [birth year] [id number]
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
## 2019-10-01 NOTE — ED PROVIDER NOTES
History  Chief Complaint   Patient presents with    Medical Problem     Pt has multiple complaints  States that he thinks he was having a panic attack and that he was having a cluster headache and he has severe pain in his knee  Pt also states that he took 10mg of morphine and is feeling very lethargic  66-year-old male re-presented to emergency department today after being discharged for similar symptoms of depression and severe anxiety  Patient says that he is unable to get health insurance and therefore cannot take any of his medications  He was seen earlier in the emergency department after say he took 2 mg of Xanax that he bought off the street, says that he has been on Xanax since 17 due to severe anxiety however, cannot afford to see a physician  Denies any SI or HI, however feels extremely depressed and thinks he may be at risk for some of these behaviors such as intentional self-harm  Was seen by crisis or earlier today as well and was given outpatient resources  Patient says that he will be unable to follow up with these as he has does not have health insurance  Would like to pursue inpatient psychiatric treatment  History provided by:  Patient   used: No        Prior to Admission Medications   Prescriptions Last Dose Informant Patient Reported? Taking?    ALPRAZolam (XANAX) 0 25 mg tablet 10/1/2019  No Yes   Sig: Take 2 tablets (0 5 mg total) by mouth 2 (two) times a day as needed for anxiety   ARIPiprazole (ABILIFY) 5 mg tablet 10/1/2019  No Yes   Sig: Take 1 tablet (5 mg total) by mouth daily At 9am   LORazepam (ATIVAN) 1 mg tablet 10/1/2019  No Yes   Sig: Take 1 tablet (1 mg total) by mouth 2 (two) times a day At 9am and 6pm   albuterol (2 5 mg/3 mL) 0 083 % nebulizer solution 10/1/2019  Yes Yes   Sig: Take 2 5 mg by nebulization as needed for wheezing or shortness of breath   albuterol (5 mg/mL) 0 5 % nebulizer solution 10/1/2019  No Yes   Sig: Take 0 5 mL (2 5 mg total) by nebulization every 6 (six) hours as needed for wheezing   albuterol (PROVENTIL HFA,VENTOLIN HFA) 90 mcg/act inhaler 10/1/2019  No Yes   Sig: Inhale 2 puffs every 6 (six) hours as needed for wheezing   albuterol (PROVENTIL HFA,VENTOLIN HFA) 90 mcg/act inhaler 10/1/2019  No Yes   Sig: Inhale 2 puffs every 4 (four) hours as needed for wheezing   carvedilol (COREG) 6 25 mg tablet 10/1/2019  No Yes   Sig: Take 1 tablet (6 25 mg total) by mouth 2 (two) times a day with meals At breakfast and dinner   fluticasone-salmeterol (ADVAIR HFA) 45-21 MCG/ACT inhaler 10/1/2019  No Yes   Sig: Inhale 2 puffs 2 (two) times a day Rinse mouth after use    hydrochlorothiazide (HYDRODIURIL) 25 mg tablet 10/1/2019  No Yes   Sig: Take 1 tablet (25 mg total) by mouth 2 (two) times a day At 9am and 6pm   levothyroxine 25 mcg tablet 10/1/2019  No Yes   Sig: Take 1 tablet (25 mcg total) by mouth daily in the early morning   lisinopril (ZESTRIL) 20 mg tablet 10/1/2019  No Yes   Sig: Take 1 tablet (20 mg total) by mouth 2 (two) times a day At 9am and 6pm   metFORMIN (GLUCOPHAGE) 500 mg tablet 10/1/2019  No Yes   Sig: Take 1 tablet (500 mg total) by mouth 2 (two) times a day with meals With breakfast and dinner   montelukast (SINGULAIR) 10 mg tablet 10/1/2019  No Yes   Sig: Take 1 tablet (10 mg total) by mouth daily at bedtime   omeprazole (PriLOSEC) 20 mg delayed release capsule 10/1/2019  No Yes   Sig: Take 1 capsule (20 mg total) by mouth daily Before breakfast   sertraline (ZOLOFT) 100 mg tablet 10/1/2019  No Yes   Sig: Take 1 tablet (100 mg total) by mouth daily At 9am   zolpidem (AMBIEN) 10 mg tablet 10/1/2019  No Yes   Sig: Take 1 tablet (10 mg total) by mouth daily at bedtime as needed for sleep      Facility-Administered Medications: None       Past Medical History:   Diagnosis Date    Anxiety     Asthma     [facility]chronic obstructive pulmonary disease) ([facility] )     Depression     Diabetes mellitus ([facility] )     Disease of thyroid gland     Hypertension     IBS (irritable bowel syndrome)     Psychiatric disorder     Bipolar Disorder       Past Surgical History:   Procedure Laterality Date    KNEE SURGERY Right     TONSILLECTOMY         Family History   Problem Relation Age of Onset    Anxiety disorder Mother     Depression Mother     Bipolar disorder Mother     Anxiety disorder Maternal Aunt     Anxiety disorder Maternal Grandmother      I have reviewed and agree with the history as documented  Social History     Tobacco Use    Smoking status: Smoker, Current Status Unknown     Packs/day: 1 00     Types: Cigarettes    Smokeless tobacco: Never Used   Substance Use Topics    Alcohol use: No     Comment: denies     Drug use: Yes     Types: MDMA (ecstacy), Marijuana, Methamphetamines, Cocaine     Comment: reports recent use (overdose) only         Review of Systems   Constitutional: Negative for appetite change, chills, diaphoresis, fatigue and fever  HENT: Negative for congestion, rhinorrhea, sore throat, trouble swallowing and voice change  Eyes: Negative for photophobia, redness and visual disturbance  Respiratory: Negative for cough, chest tightness, shortness of breath and wheezing  Cardiovascular: Negative for chest pain, palpitations and leg swelling  Gastrointestinal: Negative for abdominal distention, abdominal pain, blood in stool, constipation, diarrhea, nausea and vomiting  Genitourinary: Negative for dysuria, frequency, hematuria and urgency  Musculoskeletal: Negative for arthralgias, back pain and myalgias  Skin: Negative for pallor, rash and wound  Neurological: Negative for dizziness, tremors, seizures, syncope, weakness, light-headedness, numbness and headaches  Psychiatric/Behavioral: Positive for dysphoric mood and sleep disturbance  Negative for agitation, confusion, hallucinations, self-injury and suicidal ideas  The patient is nervous/anxious          Physical Exam  ED Triage Vitals [09/30/19 2246]   Temperature Pulse Respirations Blood Pressure SpO2   98 6 °F (37 °C) (!) 120 18 155/91 94 %      Temp src Heart Rate Source Patient Position - Orthostatic VS BP Location FiO2 (%)   -- Monitor Lying Left arm --      Pain Score       8             Orthostatic Vital Signs  Vitals:    09/30/19 2246 10/01/19 0121 10/01/19 0621   BP: 155/91 161/94 155/92   Pulse: (!) 120 (!) 107 98   Patient Position - Orthostatic VS: Lying Lying        Physical Exam   Constitutional: He is oriented to person, place, and time  He appears well-developed and well-nourished  No distress  HENT:   Head: Normocephalic and atraumatic  Nose: Nose normal    Mouth/Throat: Oropharynx is clear and moist  No oropharyngeal exudate  Eyes: Pupils are equal, round, and reactive to light  Conjunctivae are normal  Right eye exhibits no discharge  Left eye exhibits no discharge  Neck: Normal range of motion  Cardiovascular: Normal rate and regular rhythm  No murmur heard  Pulmonary/Chest: Effort normal and breath sounds normal  No respiratory distress  He has no wheezes  He has no rales  Abdominal: Soft  Bowel sounds are normal  He exhibits no distension  There is no tenderness  Musculoskeletal: Normal range of motion  He exhibits no edema or deformity  Neurological: He is alert and oriented to person, place, and time  No cranial nerve deficit  Skin: Skin is warm and dry  No rash noted  He is not diaphoretic  No pallor  Psychiatric: He has a normal mood and affect  Patient is very tearful throughout  Is awake and alert throughout exam     Nursing note and vitals reviewed        ED Medications  Medications   methocarbamol (ROBAXIN) tablet 500 mg (has no administration in time range)   dicyclomine (BENTYL) capsule 10 mg (has no administration in time range)   cloNIDine (CATAPRES) tablet 0 1 mg (has no administration in time range)   loperamide (IMODIUM) oral liquid 2 mg (has no administration in time range) albuterol (PROVENTIL HFA,VENTOLIN HFA) inhaler 2 puff (2 puffs Inhalation Given 9/30/19 2014)   ALPRAZolam (XANAX) tablet 0 25 mg (0 25 mg Oral Given 10/1/19 0021)   LORazepam (ATIVAN) tablet 1 mg (1 mg Oral Given 10/1/19 0227)       Diagnostic Studies  Results Reviewed     Procedure Component Value Units Date/Time    Rapid drug screen, urine [[id number]]  (Abnormal) Collected:  09/30/19 2317    Lab Status:  Final result Specimen:  Urine, Clean Catch Updated:  10/01/19 0004     Amph/Meth UR Negative     Barbiturate Ur Positive     Benzodiazepine Urine Positive     Cocaine Urine Negative     Methadone Urine Negative     Opiate Urine Positive     PCP Ur Negative     THC Urine Negative    Narrative:       Presumptive report  If requested, specimen will be sent to reference lab for confirmation  FOR MEDICAL PURPOSES ONLY  IF CONFIRMATION NEEDED PLEASE CONTACT THE LAB WITHIN 5 DAYS  Drug Screen Cutoff Levels:  AMPHETAMINE/METHAMPHETAMINES  1000 ng/mL  BARBITURATES     200 ng/mL  BENZODIAZEPINES     200 ng/mL  COCAINE      300 ng/mL  METHADONE      300 ng/mL  OPIATES      300 ng/mL  PHENCYCLIDINE     25 ng/mL  THC       50 ng/mL      POCT alcohol breath test [[id number]]  (Normal) Resulted:  09/30/19 2310    Lab Status:  Final result Updated:  09/30/19 2310     EXTBreath Alcohol 0 000                 No orders to display         Procedures  Procedures        ED Course  ED Course as of Oct 01 0649   Mon Sep 30, 2019   2326 Patient signed a 12                                  MDM  Number of Diagnoses or Management Options  Diagnosis management comments: 78-year-old male with history of benzodiazepine use for significant anxiety, presenting after being discharged earlier this afternoon for feelings of depression and anxiety  Patient was discharged with short supply of 0 25 mg of Xanax for concern that he would go into withdrawal due to his dependence    Patient says that he never picked up the prescription, however did take 10 mg of p o  morphine  Patient says that he feels uncomfortable going home as he will be unable to follow up outpatient for psychiatric treatment  Patient signed a 12 after discussing his symptoms with crisis  Awaiting placement  Amount and/or Complexity of Data Reviewed  Clinical lab tests: ordered and reviewed  Discussion of test results with the performing providers: yes  Review and summarize past medical records: yes  Discuss the patient with other providers: yes    Risk of Complications, Morbidity, and/or Mortality  Presenting problems: low  Diagnostic procedures: minimal  Management options: minimal    Patient Progress  Patient progress: stable      Disposition  Final diagnoses:   Depression     Time reflects when diagnosis was documented in both MDM as applicable and the Disposition within this note     Time User Action Codes Description Comment    10/1/2019  1:36 AM Rupal FERNANDEZ Add [I10] Essential hypertension     10/1/2019  1:36 AM Rupal FERNANDEZ Modify [I10] Essential hypertension     10/1/2019  6:30 AM Aubrie Chamberlain Add [F32 9] Depression       ED Disposition     ED Disposition Condition Date/Time Comment    Transfer to [address] [place] [address] Oct 1, 2019  6:31 AM Carlos Yepez should be transferred out to [facility] and has been medically cleared          MD Documentation      Most Recent Value   Patient Condition  The patient has been stabilized such that within reasonable medical probability, no material deterioration of the patient condition or the condition of the unborn child(tre) is likely to result from the transfer   Reason for Transfer  Level of Care needed not available at this facility   Benefits of Transfer  Specialized equipment and/or services available at the receiving facility (Include comment)________________________   Risks of Transfer  Potential for delay in receiving treatment, Potential deterioration of medical condition, Increased discomfort during transfer Accepting Physician  Dr Monalisa Madrigal Name, Lashanda Rater   Sending MD  [facility]    Provider Certification  General risk, such as traffic hazards, adverse weather conditions, rough terrain or turbulence, possible failure of equipment (including vehicle or aircraft), or consequences of actions of persons outside the control of the transport personnel, Unanticipated needs of medical equipment and personnel during transport      RN Documentation      Most [facility] Name, [address]   [facility]      Follow-up Information    None         Patient's Medications   Discharge Prescriptions    No medications on file     No discharge procedures on file  ED Provider  Attending physically available and evaluated Seun Almaguer I managed the patient along with the ED Attending      Electronically Signed by         Lola Romero MD  10/01/19 [id number] Mercy Ector, MD  10/01/19 6660

## 2019-10-01 NOTE — DISCHARGE INSTRUCTIONS
Take medication as prescribed for anxiety  Do not take illegal drugs that are not prescribed to you  Follow up with a primary care physician, gastroenterologist, and mental health provider for further management of your problems

## 2019-10-01 NOTE — ED NOTES
Per Butch Aguilar at [facility] stated 201 was given to [facility] 3P at this time

## 2019-10-01 NOTE — ED NOTES
Pt's breakfast tray as still not arrived  Calling dietary  Pt aware        Summer Mauricio RN  [phone]

## 2019-10-01 NOTE — ASSESSMENT & PLAN NOTE
Lab Results   Component Value Date    HGBA1C 8 1 (H) 12/12/2018       Recent Labs     10/01/19  1235   POCGLU 106       Blood Sugar Average: Last 72 hrs:  (P) 106   Patient takes metformin    Check hemoglobin A1c  Insulin sliding scale

## 2019-10-01 NOTE — NURSING NOTE
Patient was admitted at [id number] from the [address] [place] [address]  He was co-operative during admission Hewas pleasant toward staff  He did deny using drugs which was not true He was  Shown around unit and given sabillon rules and routine  He was encouraged to express his feelings and his needs     We will do our best to help him Patient was depressed and he stated that his behavior was getting out of control

## 2019-10-01 NOTE — ASSESSMENT & PLAN NOTE
Management per primary  We are consulted for medical management  See plans for patient's medical conditions  Reviewed lab work, EKG, medications

## 2019-10-01 NOTE — ED NOTES
Pt pulled his iv out intentionally  Pt bleeding, blood running down his arm pt wiping with gown  IV fluids running all over floor        Mary Jane Case RN  09/30/19 2040

## 2019-10-01 NOTE — ED NOTES
Patient is accepted at [facility]  Patient is accepted by Dr Jesse Lopez per Vee Quigley  Transportation is arranged with [facility] is scheduled for 0745  Nurse report is to be called to  [phone] prior to patient transfer

## 2019-10-01 NOTE — ED NOTES
Pt rang call bell and asked for and update on his plan of care  RN informed him that we were waiting on urine sample and for him to speak with crisis  Pt stated, "I don't need to talk to crisis I'm not suicidal  Can I just leave AMA then  I'd rather be home " Dr Gordy Santiago, WILDER  09/30/19 2029

## 2019-10-01 NOTE — ASSESSMENT & PLAN NOTE
On EKG  Patient does have anxiety    Consider up titrating carvedilol and discontinuing hydrochlorothiazide

## 2019-10-02 LAB
ALBUMIN SERPL BCP-MCNC: 4.2 G/DL (ref 3–5.2)
ALP SERPL-CCNC: 115 U/L (ref 43–122)
ALT SERPL W P-5'-P-CCNC: 150 U/L (ref 9–52)
AST SERPL W P-5'-P-CCNC: 73 U/L (ref 17–59)
BILIRUB DIRECT SERPL-MCNC: 0.2 MG/DL
BILIRUB SERPL-MCNC: 0.4 MG/DL
CHOLEST SERPL-MCNC: 146 MG/DL
EST. AVERAGE GLUCOSE BLD GHB EST-MCNC: 131 MG/DL
GLUCOSE SERPL-MCNC: 110 MG/DL (ref 65–140)
GLUCOSE SERPL-MCNC: 116 MG/DL (ref 65–140)
GLUCOSE SERPL-MCNC: 138 MG/DL (ref 65–140)
GLUCOSE SERPL-MCNC: 94 MG/DL (ref 65–140)
HAV IGM SER QL: NORMAL
HBA1C MFR BLD: 6.2 % (ref 4.2–6.3)
HBV CORE IGM SER QL: NORMAL
HBV SURFACE AG SER QL: NORMAL
HCV AB SER QL: NORMAL
HDLC SERPL-MCNC: 35 MG/DL (ref 40–59)
LDLC SERPL CALC-MCNC: 84 MG/DL
NONHDLC SERPL-MCNC: 111 MG/DL
PROT SERPL-MCNC: 8 G/DL (ref 5.9–8.4)
T4 FREE SERPL-MCNC: 1 NG/DL (ref 0.76–1.46)
TRIGL SERPL-MCNC: 137 MG/DL
TSH SERPL DL<=0.05 MIU/L-ACNC: 0.31 UIU/ML (ref 0.47–4.68)

## 2019-10-02 PROCEDURE — 83036 HEMOGLOBIN GLYCOSYLATED A1C: CPT | Performed by: PHYSICIAN ASSISTANT

## 2019-10-02 PROCEDURE — 84439 ASSAY OF FREE THYROXINE: CPT | Performed by: PHYSICIAN ASSISTANT

## 2019-10-02 PROCEDURE — 80061 LIPID PANEL: CPT | Performed by: PHYSICIAN ASSISTANT

## 2019-10-02 PROCEDURE — 84443 ASSAY THYROID STIM HORMONE: CPT | Performed by: PHYSICIAN ASSISTANT

## 2019-10-02 PROCEDURE — 80074 ACUTE HEPATITIS PANEL: CPT | Performed by: PSYCHIATRY & NEUROLOGY

## 2019-10-02 PROCEDURE — 99231 SBSQ HOSP IP/OBS SF/LOW 25: CPT | Performed by: NURSE PRACTITIONER

## 2019-10-02 PROCEDURE — 80076 HEPATIC FUNCTION PANEL: CPT | Performed by: PHYSICIAN ASSISTANT

## 2019-10-02 PROCEDURE — 82948 REAGENT STRIP/BLOOD GLUCOSE: CPT

## 2019-10-02 RX ORDER — LORAZEPAM 1 MG/1
1 TABLET ORAL ONCE
Status: COMPLETED | OUTPATIENT
Start: 2019-10-02 | End: 2019-10-02

## 2019-10-02 RX ADMIN — HYDROCHLOROTHIAZIDE 12.5 MG: 12.5 TABLET ORAL at 08:15

## 2019-10-02 RX ADMIN — CARVEDILOL 6.25 MG: 6.25 TABLET, FILM COATED ORAL at 08:15

## 2019-10-02 RX ADMIN — LORAZEPAM 1 MG: 1 TABLET ORAL at 15:46

## 2019-10-02 RX ADMIN — SERTRALINE HYDROCHLORIDE 50 MG: 50 TABLET ORAL at 08:15

## 2019-10-02 RX ADMIN — TRAZODONE HYDROCHLORIDE 50 MG: 50 TABLET ORAL at 21:35

## 2019-10-02 RX ADMIN — PANTOPRAZOLE SODIUM 20 MG: 20 TABLET, DELAYED RELEASE ORAL at 05:58

## 2019-10-02 RX ADMIN — CARVEDILOL 6.25 MG: 6.25 TABLET, FILM COATED ORAL at 15:46

## 2019-10-02 RX ADMIN — LORAZEPAM 1 MG: 1 TABLET ORAL at 21:35

## 2019-10-02 RX ADMIN — LISINOPRIL 20 MG: 20 TABLET ORAL at 08:15

## 2019-10-02 RX ADMIN — LEVOTHYROXINE SODIUM 25 MCG: 25 TABLET ORAL at 05:59

## 2019-10-02 RX ADMIN — ARIPIPRAZOLE 5 MG: 5 TABLET ORAL at 08:15

## 2019-10-02 NOTE — PROGRESS NOTES
Patient vitals retaken manually due to increase in the morning  /101 HR 98  Patient denies any symptoms at this time   Will monitor

## 2019-10-02 NOTE — PROGRESS NOTES
Progress Note - Stephania Agosto 34 y o  male MRN: [id number]  Unit/Bed#: Sheila Freitas 739-00 Encounter: [id number]    The patient was seen for continuing care and reviewed with treatment team  Patient was observed in his room, lying in bed  He is pleasant and cooperative upon approach  He has good eye contact with a blunted affect  Patient is denying any depression or anxiety but did appear anxious during our encounter  Patient states he "just wants to go home"  Patient stated he feels he is better and does not need to be on the unit  He stated he only came in because he was not taking his Abilify for approx 1 month d/t not having any insurance and could not afford it out of pocket  Patient stated he needs to get back to work at [employer] where he is a  and will have insurance through his job soon and will be able to afford his medications  He is denying SI, HI or AVs currently  He is reporting good sleep and appetite  He is compliant with medications and no side effects noted at this time  Patient state he did sign a 72 hr notice      Mental Status Evaluation:  Appearance:  Adequate hygiene and grooming   Behavior:  cooperative and friendly   Mood:  anxious   Affect: blunted   Speech: Normal rate and Normal volume   Thought Process:  coherent   Thought Content:  Does not verbalize delusional material   Perceptual Disturbances: Denies hallucinations and does not appear to be responding to internal stimuli   Risk Potential: No suicidal or homicidal ideation   Attention/Concentration attention span and concentration were age appropriate   Orientation:   Alert and awake   Gait/Station: Not observed   Motor Activity: No abnormal movement noted     Progress Toward Goals: No change    Assessment/Plan    Principal Problem:    Bipolar affective disorder, currently depressed, moderate (HCC)  Active Problems:    Essential hypertension    Transaminitis    Asthma    Sinus tachycardia    Generalized anxiety disorder    Morbid obesity due to excess calories ([facility] )    Type 2 diabetes mellitus without complication, without long-term current use of insulin ([place] [address] )    Acquired hypothyroidism    Polysubstance abuse ([place] [address] )      Recommended Treatment:   1  Continue with pharmacotherapy, group therapy, milieu therapy and occupational therapy  2  Continue with unit safety checks  3  No medication changes at this time   The patient will be maintained on the following medications:    Current Facility-Administered Medications:  albuterol 2 puff Inhalation Q4H PRN Alla Nance MD   aluminum-magnesium hydroxide-simethicone 30 mL Oral Q4H PRN Alla Nance MD   ARIPiprazole 5 mg Oral Daily Philippe Rush MD   benztropine 1 mg Intramuscular Q6H PRN Alla Nance MD   benztropine 1 mg Oral Q6H PRN Alla Nance MD   carvedilol 6 25 mg Oral BID With Meals Lorri Schwarz PA-C   fluticasone-vilanterol 1 puff Inhalation Daily Lorri Schwarz PA-C   haloperidol 5 mg Oral Q6H PRN Alla Nance MD   haloperidol lactate 5 mg Intramuscular Q6H PRN Alla Nance MD   hydrochlorothiazide 12 5 mg Oral Daily Lorri Tijerina PA-C   insulin lispro 1-5 Units Subcutaneous HS Lorri Tijerina PA-C   insulin lispro 1-6 Units Subcutaneous TID AC Lorri Tijerina PA-C   levothyroxine 25 mcg Oral Early Morning Lorri Tijerina PA-C   lisinopril 20 mg Oral Daily Lorri Tijerina PA-C   LORazepam 1 mg Intramuscular Q6H PRN Philippe Rush MD   LORazepam 2 mg Intramuscular Q6H PRN Alla Nance MD   LORazepam 2 mg Intramuscular Q6H PRN Samuel Samuels MD   LORazepam 1 mg Oral Q6H PRN Alla Nance MD   magnesium hydroxide 30 mL Oral Daily PRN Alla Nance MD   naproxen 500 mg Oral BID PRN Lorri Tijerina PA-C   nicotine 1 patch Transdermal Daily Alla Nance MD   pantoprazole 20 mg Oral Early Morning Lorri Tijerina PA-C   sertraline 50 mg Oral Daily Samuel Samuels MD   traZODone 50 mg Oral HS MAYNOR Greene MD       Risks, benefits and possible side effects of Medications:   Risks, benefits, and possible side effects of medications explained to patient and patient verbalizes understanding  MONY Rios      Portions of the record may have been created with voice recognition software  Occasional wrong word or "sound a like" substitutions may have occurred due to the inherent limitations of voice recognition software  Read the chart carefully and recognize, using context, where substitutions have occurred

## 2019-10-02 NOTE — TREATMENT PLAN
TREATMENT PLAN REVIEW - [address] [address] 34 y o  1990 male MRN: [id number]    [address] Room / Bed: Romina C[facility]/U 780-05 Encounter: [id number]          Admit Date/Time:  10/1/2019  9:17 AM    Treatment Team: Attending Provider: Andra Jaramillo MD; Consulting Physician: Jessy Muñoz MD; Patient Care Assistant: Alka Dan; Registered Nurse: Tino Olguin RN; Occupational Therapist: Lucas Campbell; : Jaci Sommer;  Patient Care Technician: Hoyt Hammans; Registered Nurse: Sagar Thakur RN; Patient Care Technician: Randee Avila    Diagnosis: Principal Problem:    Bipolar affective disorder, currently depressed, moderate ([facility] 75 )  Active Problems:    Essential hypertension    Transaminitis    Asthma    Sinus tachycardia    Generalized anxiety disorder    Morbid obesity due to excess calories ([facility] 75 )    Type 2 diabetes mellitus without complication, without long-term current use of insulin ([facility] )    Acquired hypothyroidism    Polysubstance abuse ([facility] 75 )    Patient Strengths: interpersonal skills, negotiates basic needs, patient is on a voluntary commitment     Patient Barriers: limited support system, medical problems    Short Term Goals: decrease in depressive symptoms, decrease in anxiety symptoms, improvement in self care, sleep improvement, tolerate medications, mood stabilization    Long Term Goals: resolution of depressive symptoms, stabilization of mood, free of suicidal thoughts, improved reality testing, improved insight    Progress Towards Goals: starting psychitric medications as prescribed    Recommended Treatment: medication management, patient medication education, group therapy, milieu therapy, continued Behavioral Health psychiatric evaluation/assessment process     Treatment Frequency: daily medication monitoring, group and milieu therapy daily, monitoring through interdisciplinary rounds, monitoring through weekly patient care conferences    Expected Discharge Date:  3-4 days    Discharge Plan: referral for outpatient drug and alcohol counseling, referral for outpatient medication management with a psychiatrist, referral for outpatient psychotherapy    Treatment Plan Created/Updated By: Kahlil Arnett MD

## 2019-10-02 NOTE — PLAN OF CARE
Patient pleasant but vague upon approach  Patient continues on CIWA protocol and scored a 0 at this time  Patient denies anxiety, depression, S/HI,A/VH and pain  Med and meal compliant  Patient is visible and some social with select peers  Patient has a 72 hour notice expiring 10/4/19  Patient refuses his nicotine patch and BREO inhaler  No insulin coverage needed for BG of 110  Patient aware of same  Will continue to monitor and provide therapeutic support

## 2019-10-02 NOTE — DISCHARGE INSTR - OTHER ORDERS
If you are experiencing a mental health emergency, you may call the [address] [facility] 24 hours a day, 7 days per week at [phone]  In [facility], call [phone]  When you need someone to listen, the Ian Pleas is available for 16 hours a day, 7 days a week, from the time of 7-10am and 2pm-2am   It is not available from the hours of 2am-6am and 10am-2pm  A representative can be reached at [phone]  HOW TO GET SUBSTANCE ABUSE HELP:  If you or someone you know has a drug or alcohol problem, there is help:  Reg 44: [address]: [phone]  An assessment is the first step  In addition to those listed there are other programs available in the area but assessment is best to determine an appropriate level of care  If you DO NOT have Medical Assistance (MA) or [employer], an assessment can be scheduled at one of these providers:  [address] [place] [address] Jose Gud[address]ire[address], [address]  [phone]   [address] , [place], [address]  [address] [facility] [address] [place], [address]   Step by 8012 [facility] [address] , [place], 98 [facility]  [phone]   Treatment Trends  [place]  13[address] [facility] , [place], 98 [facility]  2000 [facility],[address] , [address], [place], [address] Harman [place] [phone]     If you [address], an assessment can be scheduled at one of these providers:  [place] [facility]  [address] , [place], 98 [facility]  [address]  [address], [address]  [address]  D&A Intake Unit  [address] [place] [address] [address] , 1st Floor, [facility], [address]  [address] [facility] [address], [address], [place], 4420 [facility] [phone]   [facility]  [address] , [place], [address]  [address]  [place], 122 [facility]) [place] 57 [facility], [place], [address] [facility] 721 [facility] [place], 75 Karena Solares   Step by 8012 [facility] [address] , [place], 98 [facility]  [phone]   Treatment Trends  [place]  1320 [facility] , [place], 98 [facility]  2000 [facility],[address] , [address], [place], [address] [facility] [phone]     If you [address], an assessment can be scheduled at one of these providers  Please contact these Providers to determine if they are in your network plan:  [facility] D&A Intake Unit  620 [facility] [address] , 1st Floor, [place], [address]  [facility] [address] , [place] [address]  [address]  [place], 122 [facility]) [place]  57 [facility], [place], [address] 253 [facility]  721 [facility] [place], 102 E [facility] [address] , [address], [place], [address]cl[address] 94

## 2019-10-02 NOTE — PROGRESS NOTES
10/02/19 1117   Team Meeting   Meeting Type Daily Rounds   Initial Conference Date 10/02/19   Team Members Present   Team Members Present Physician;Nurse;   Physician Team Member Dr Barr Police Management Team Member Victoriano Waters   Patient/Family Present   Patient Present No   Patient's Family Present No   New admission, chart and labs were reviewed  Medications to be discussed and started  Patient isolative to himself  Discharge date to be determined

## 2019-10-02 NOTE — SOCIAL WORK
Pt guarded during initial biopsychosocial assessment  Pt's mood and affect anxious  Pt's speech normal rate and rhythm, eye contact intermittent and appearance disheveled  Pt's thought process/content appropriate/organized  Pt reports coming to the hospital after having a "nervous breakdown"  Pt reports "I am ready to get out of here though"  Pt reports he had a nervous breakdown due to "a lot of things piling up at once" but would not provide specifics  Pt reports he thinks it is also due to not having his medications  Pt reports he does not have insurance currently because he is making too much to qualify for medicaid but is not through his 90 day period at his new employment  Pt requesting to leave the hospital AMA because he has too many things to do outside the hospital, stating "I have a life"  Pt signed a 72 hr notice on 10/1  Pt's UDS + barbiturates, benzos, and opiates  Pt guarded about substance use  Pt denied substance abuse and reports he was positive due to the Xanax and morphine he took prior to admission  Per ED notes, heroin use was indicated  Pt denied heroin use, stating "I was out of it down there  I mean they got me to sign myself in"  Pt reports TOB use about 2 cigarettes/week  Pt reports family hx of substance abuse  Pt denies hx of abuse  Pt reports psychosocial loss of his cousin about 1-2 weeks ago  Pt denies legal involvement to worker  Per chart pt has pending DUI charges with an upcoming court hearing at the end of October  Pt reports hx of one inpatient psychiatric hospitalization in 12/2018 at [facility]  Pt reports he was hospitalized because he tried to kill himself  Pt reports that was his first suicide attempt  Pt reports he took "copious" amounts of pills  Pt reports he does not know what pills he took  Pt denies any current outpatient provider due to having no insurance at this time  Pt reports he has a PCP through [address] on [facility], Dr Raquel Perkins       Pt is currently in a relationship, never  with no mention of children  Pt reports his girlfriend is a positive support for him and they have been together for 10 years  Pt reports they live together  Pt has positive support from him mother and 3 sisters  Pt has no contact with his father  Pt is employed at [employer]  Pt recently started the job about 1 5 months ago which is why he has not yet been able to gain insurance through employment  Pt concerned about losing his job due to hospitalization  Worker will provide work letter upon discharge  Pt reports some college experience  Pt reports he is currently 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

## 2019-10-03 VITALS
BODY MASS INDEX: 38.49 KG/M2 | WEIGHT: 276 LBS | HEART RATE: 110 BPM | SYSTOLIC BLOOD PRESSURE: 132 MMHG | DIASTOLIC BLOOD PRESSURE: 90 MMHG | OXYGEN SATURATION: 98 % | TEMPERATURE: 98.3 F | RESPIRATION RATE: 18 BRPM

## 2019-10-03 PROBLEM — F17.200 SMOKING ADDICTION: Status: ACTIVE | Noted: 2019-10-03

## 2019-10-03 PROBLEM — K21.00 REFLUX ESOPHAGITIS: Status: ACTIVE | Noted: 2019-10-03

## 2019-10-03 LAB
GLUCOSE SERPL-MCNC: 101 MG/DL (ref 65–140)
GLUCOSE SERPL-MCNC: 114 MG/DL (ref 65–140)

## 2019-10-03 PROCEDURE — 99238 HOSP IP/OBS DSCHRG MGMT 30/<: CPT | Performed by: PSYCHIATRY & NEUROLOGY

## 2019-10-03 PROCEDURE — 82948 REAGENT STRIP/BLOOD GLUCOSE: CPT

## 2019-10-03 RX ORDER — PANTOPRAZOLE SODIUM 20 MG/1
20 TABLET, DELAYED RELEASE ORAL
Qty: 30 TABLET | Refills: 0 | Status: SHIPPED | OUTPATIENT
Start: 2019-10-04

## 2019-10-03 RX ORDER — LISINOPRIL 20 MG/1
20 TABLET ORAL DAILY
Qty: 30 TABLET | Refills: 0 | Status: SHIPPED | OUTPATIENT
Start: 2019-10-04

## 2019-10-03 RX ORDER — CARVEDILOL 6.25 MG/1
6.25 TABLET ORAL 2 TIMES DAILY WITH MEALS
Qty: 60 TABLET | Refills: 0 | Status: SHIPPED | OUTPATIENT
Start: 2019-10-03

## 2019-10-03 RX ORDER — TRAZODONE HYDROCHLORIDE 50 MG/1
50 TABLET ORAL
Qty: 30 TABLET | Refills: 0 | Status: SHIPPED | OUTPATIENT
Start: 2019-10-03

## 2019-10-03 RX ORDER — ARIPIPRAZOLE 5 MG/1
5 TABLET ORAL DAILY
Qty: 30 TABLET | Refills: 0 | Status: ON HOLD | OUTPATIENT
Start: 2019-10-04 | End: 2019-10-08 | Stop reason: SDUPTHER

## 2019-10-03 RX ORDER — HYDROCHLOROTHIAZIDE 12.5 MG/1
12.5 TABLET ORAL DAILY
Qty: 30 TABLET | Refills: 0 | Status: SHIPPED | OUTPATIENT
Start: 2019-10-04 | End: 2019-10-08 | Stop reason: HOSPADM

## 2019-10-03 RX ORDER — LEVOTHYROXINE SODIUM 0.03 MG/1
25 TABLET ORAL
Qty: 30 TABLET | Refills: 0 | Status: SHIPPED | OUTPATIENT
Start: 2019-10-03

## 2019-10-03 RX ORDER — NICOTINE 21 MG/24HR
1 PATCH, TRANSDERMAL 24 HOURS TRANSDERMAL DAILY
Qty: 28 PATCH | Refills: 0 | Status: SHIPPED | OUTPATIENT
Start: 2019-10-04

## 2019-10-03 RX ORDER — ALBUTEROL SULFATE 90 UG/1
2 AEROSOL, METERED RESPIRATORY (INHALATION) EVERY 4 HOURS PRN
Qty: 1 INHALER | Refills: 0 | Status: SHIPPED | OUTPATIENT
Start: 2019-10-03

## 2019-10-03 RX ADMIN — SERTRALINE HYDROCHLORIDE 50 MG: 50 TABLET ORAL at 08:36

## 2019-10-03 RX ADMIN — ARIPIPRAZOLE 5 MG: 5 TABLET ORAL at 08:35

## 2019-10-03 RX ADMIN — HYDROCHLOROTHIAZIDE 12.5 MG: 12.5 TABLET ORAL at 08:35

## 2019-10-03 RX ADMIN — PANTOPRAZOLE SODIUM 20 MG: 20 TABLET, DELAYED RELEASE ORAL at 06:25

## 2019-10-03 RX ADMIN — LISINOPRIL 20 MG: 20 TABLET ORAL at 08:36

## 2019-10-03 RX ADMIN — CARVEDILOL 6.25 MG: 6.25 TABLET, FILM COATED ORAL at 08:36

## 2019-10-03 RX ADMIN — LEVOTHYROXINE SODIUM 25 MCG: 25 TABLET ORAL at 06:25

## 2019-10-03 NOTE — PLAN OF CARE
Worker opened case with [facility] in order for pt to access services while uninsured

## 2019-10-03 NOTE — PROGRESS NOTES
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  His goal is to work in oncology  His mother had cancer when he was 15 and he had to take care of her  She currently is hospitalized for her heart and he is worried about her since she does not tell the truth about her health minimizing it  He also noted that he learned a friend of his overdosed and he is fearful that she may have ; she had 4 IV's in her in the hospital and was resuscitated a few times  In the ER note it states he has court for a DUI he denies this  He states he signed his mother's name on a permission slip when he was a teenager and it now is catching up with him  He has a warrant; he anticipates getting fines and he will do KAYLEE  The ER note also states he was doing heroin; he denies this also; he took morphine an old prescription from his knee surgery that day since he worked 12 hours standing  The barbiturate in his system per patient was his Fioricet that he takes for migraines and he is prescribed Xanax 1mg 3 times a day  He did admit on occasion he would take an extra one but denies abuse  He tried one E pill once when he was in college  He smoked marijuana from [age] and has not used it since  He denies drinking alcohol for the past 4 years  He denies all other drug use  Patient appears to be overly responsible as a child and adult and may have symptoms of codependency   He presents as goal oriented  His family is very important to him  His stress is balancing his work, school and family  He knows alcoholism is in the family system and appears to make decisions not to abuse drugs or alcohol  At this time he does not meet the criteria for D/A treatment but he will need to follow through with a psychiatrist for his bipolar disorder  A therapist may assist him in learning how to balance his life

## 2019-10-03 NOTE — NURSING NOTE
Patient walked off the unit accompanied by an MHT  Patient being picked up by Kumar and is being discharged home  Patient has his belongings and scripts  AVS reviewed and patient verbalized understanding

## 2019-10-03 NOTE — DISCHARGE SUMMARY
Discharge Summary - Stephania Agosto 34 y o  male MRN: [id number]  Unit/Bed#: [facility] Encounter: [id number]     Admission Date: 10/1/2019         Discharge Date: No discharge date for patient encounter  Attending Psychiatrist: Adrienne Angulo MD    Reason for Admission/HPI: Brielle Jiang is a 34 y o  male with a history of depression versus bipolar disorder who was admitted to the inpatient psychiatric unit on a voluntary 201 commitment basis due to depression, anxiety, unstable mood and suicidal ideation  Symptoms prior to admission included worsening depression, hopelessness, helplessness, poor concentration, weight loss, difficulty sleeping, increased irritability, anxiety symptoms, obsessive thoughts, noncompliance with treatment and noncompliance with medications  Onset of symptoms was gradual starting several weeks ago with gradually worsening course since that time  Stressors preceding admission included drug use problems, family problems, financial problems, lack of health insurance, limited support and social difficulties  Ely Christiansen reported feeling overwhelmed, frustrated and anxious and stated that "I feel like I am kind of drowning"  On initial evaluation after admission to the inpatient psychiatric unit Ely Christiansen appeared distracted and unmotivated for treatment  He reported that he however would like to restart his medications prior to discharge  Past Medical History:   Diagnosis Date    Anxiety     Asthma     COPD (chronic obstructive pulmonary disease) [facility] [address] [facility]     Depression     [facility]     Disease of thyroid gland     Hypertension     IBS (irritable bowel syndrome)     Psychiatric disorder     Bipolar Disorder    Psychiatric illness      Past Surgical History:   Procedure Laterality Date    KNEE SURGERY Right     TONSILLECTOMY         Medications: All current active medications have been reviewed  Allergies:      Allergies Allergen Reactions    Pollen Extract        Objective     Vital signs in last 24 hours:    Temp:  [98 3 °F (36 8 °C)-98 4 °F (36 9 °C)] 98 3 °F (36 8 °C)  HR:  [102-110] 110  Resp:  [16-18] 18  BP: (132-166)/() 132/90    No intake or output data in the 24 hours ending 10/03/19 [address]:     Evangelist Amador was admitted to the inpatient psychiatric unit and started on Behavioral Health checks every 7 minutes  During the hospitalization he was encouraged to attend individual therapy, group therapy, milieu therapy and occupational therapy  Psychiatric medications were restarted during the hospital stay  To address depressive symptoms and mood instability, Evangelist Amador was treated with antidepressant Zoloft and antipsychotic medication Abilify  Medication doses were restarted during the hospital course  Prior to beginning of treatment medications risks and benefits and possible side effects including risk of parkinsonian symptoms, Tardive Dyskinesia and metabolic syndrome related to treatment with antipsychotic medications and risk of suicidality and serotonin syndrome related to treatment with antidepressants were reviewed with Evangelist Amador  He verbalized understanding and agreement for treatment  Upon admission Evangelist Amador was seen by medical service for medical clearance for inpatient treatment and medical follow up  Richi's symptoms gradually improved over the hospital course  Initially after admission he was still feeling anxious and irritable  With adjustment of medications and therapeutic milieu his symptoms gradually improved  At the end of treatment Evangelist Amador was doing much better  His mood was significantly improved at the time of discharge  Evangelist Amador denied suicidal ideation, intent or plan at the time of discharge and denied homicidal ideation, intent or plan at the time of discharge  There was no overt psychosis at the time of discharge  Behavior was appropriate on the unit at the time of discharge  Sleep and appetite were improved  Kiera Ross was tolerating medications and was not reporting any significant side effects at the time of discharge  We felt that Kiera Ross achieved the maximum benefit of inpatient stay at that point and could now follow up with outpatient treatment  Kiera Ross also felt stable and ready for discharge at the end of the hospital stay  Kiera Ross signed 72 hour notice and requested discharge  At the time of the 72 hour notice expiration he had no criteria for involuntary commitment and denied any suicidal or homicidal ideation  The outpatient follow up with a psychiatrist and a therapist was arranged by the unit  upon discharge      Mental Status at Time of Discharge:     Appearance:  casually dressed, dressed appropriately   Behavior:  pleasant, cooperative   Speech:  normal rate and volume   Mood:  normal, improved   Affect:  normal range and intensity   Thought Process:  coherent, goal directed, linear   Associations: intact associations   Thought Content:  normal, no overt delusions   Perceptual Disturbances: none   Risk Potential: Suicidal ideation - None  Homicidal ideation - None  Potential for aggression - No   Sensorium:  oriented to person, place and time/date   Memory:  recent and remote memory grossly intact   Consciousness:  alert and awake   Attention: attention span and concentration are age appropriate   Insight:  good and improved   Judgment: good and improved   Gait/Station: normal gait/station   Motor Activity: no abnormal movements       Admission Diagnosis:    Principal Problem:    Bipolar affective disorder, currently depressed, moderate ([address] )  Active Problems:    Essential hypertension    Transaminitis    Asthma    Sinus tachycardia    Generalized anxiety disorder    Morbid obesity due to excess calories ([address] )    Type 2 diabetes mellitus without complication, without long-term current use of insulin ([address] )    Acquired hypothyroidism    Polysubstance abuse ([facility] )    Smoking addiction    Reflux esophagitis      Discharge Diagnosis:     Principal Problem:    Bipolar affective disorder, currently depressed, moderate ([facility] )  Active Problems:    Essential hypertension    Transaminitis    Asthma    Sinus tachycardia    Generalized anxiety disorder    Morbid obesity due to excess calories ([facility])    Type 2 diabetes mellitus without complication, without long-term current use of insulin ([facility])    Acquired hypothyroidism    Polysubstance abuse ([facility] )    Smoking addiction    Reflux esophagitis  Resolved Problems:    * No resolved hospital problems  *      Lab Results: I have personally reviewed all pertinent laboratory/tests results  Discharge Medications:    See after visit summary for all reconciled discharge medications provided to patient and family  Discharge instructions/Information to patient and family:     See after visit summary for information provided to patient and family  Provisions for Follow-Up Care:    See after visit summary for information related to follow-up care and any pertinent home health orders  Discharge Statement:    I spent 30 minutes discharging the patient  This time was spent on the day of discharge  I had direct contact with the patient on the day of discharge  Additional documentation is required if more than 30 minutes were spent on discharge:    I reviewed with Savannah Morris importance of compliance with medications and outpatient treatment after discharge  I discussed outpatient follow up with Savannah Morris  I reviewed with Savannah Morris crisis plan and safety plan upon discharge  I discussed with Savannah Morris recommendation to follow up with outpatient drug and alcohol counseling and AA meetings  Savannah Morris agreed to abstain from drug and alcohol use after discharge  Savannah Morris signed 72 hour notice and requested discharge   At the time of the 72 hour notice expiration he had no criteria for involuntary commitment and denied any suicidal or homicidal ideation      Jazmine Cerrato MD 10/03/19

## 2019-10-03 NOTE — PROGRESS NOTES
Pt has been pleasant and cooperative, visible but not too social  Pt reports a 2/4 anxiety but denies any depression, SI, HI, AH, or VH  Pt continues to have increasing BP readings  At [address], BP read 154/104 with a HR of 110  At 2100, BP read 166/122 manually  Critical care was made aware  Sedora ordered pt a one time dose of Ativan 1 mg, which was given at 2135  Nursing was instructed to tiger text critical care if BP increases more than 170/110  Pt was also given Trazodone 50 mg PRN at 2135  Pt did not report any other symptoms besides feeling anxious  Pt reports that he just found out his friend passed away and that could be a reason his BP and HR are elevated  Pt visibly appears calm  Will continue to monitor

## 2019-10-03 NOTE — PROGRESS NOTES
10/03/19 5495   Team Meeting   Meeting Type Daily Rounds   Initial Conference Date 10/03/19   Team Members Present   Team Members Present Physician;Nurse;   Physician Team Member Dr Marquez Tika Management Team Member Keiko Grey   Patient/Family Present   Patient Present No   Patient's Family Present No   Chart and labs were reviewed  Medications to be monitored  Patient on CIWA protocol  Patient's blood pressure has been elevated  Patient signed 72 hour notice that expires on 10/4 with anticipated discharge

## 2019-10-03 NOTE — PLAN OF CARE
Problem: Ineffective Coping  Goal: Identifies ineffective coping skills  Outcome: Progressing  Goal: Identifies healthy coping skills  Outcome: Progressing  Goal: Demonstrates healthy coping skills  Outcome: Progressing  Goal: Participates in unit activities  Description  Interventions:  - Provide therapeutic environment   - Provide required programming   - Redirect inappropriate behaviors   Outcome: Progressing     Problem: Depression  Goal: Verbalize thoughts and feelings  Description  Interventions:  - Assess and re-assess patient's level of risk   - Engage patient in 1:1 interactions, daily, for a minimum of 15 minutes   - Encourage patient to express feelings, fears, frustrations, hopes   Outcome: Progressing  Goal: Refrain from harming self  Description  Interventions:  - Monitor patient closely, per order   - Supervise medication ingestion, monitor effects and side effects   Outcome: Progressing  Goal: Refrain from isolation  Description  Interventions:  - Develop a trusting relationship   - Encourage socialization   Outcome: Progressing     Problem: Anxiety  Goal: Anxiety is at manageable level  Description  Interventions:  - Assess and monitor patient's anxiety level  - Monitor for signs and symptoms (heart palpitations, chest pain, shortness of breath, headaches, nausea, feeling jumpy, restlessness, irritable, apprehensive)  - Collaborate with interdisciplinary team and initiate plan and interventions as ordered    - [place] patient to unit/surroundings  - Explain treatment plan  - Encourage participation in care  - Encourage verbalization of concerns/fears  - Identify coping mechanisms  - Assist in developing anxiety-reducing skills  - Administer/offer alternative therapies  - Limit or eliminate stimulants  Outcome: Progressing

## 2019-10-03 NOTE — PROGRESS NOTES
Patient cooperative but guarded this AM   Patient denies depression, anxiety, S/HI, and A/VH  Patient states he would feel better if he was discharged  Patient visible and social on the unit but states he wants to get out of here

## 2019-10-03 NOTE — PLAN OF CARE
Pt discharged today  Pt denies SI/HI, AH/VH  Pt oriented x3  Pt to return home  Worker scheduled [employer] transport for 1400  Pt to follow up with [facility] until insurance becomes active  Pt sent with scripts

## 2019-10-04 NOTE — PROGRESS NOTES
10/04/19 1124   Team Meeting   Meeting Type Tx Team Meeting   Initial Conference Date 10/03/19   Team Members Present   Team Members Present Physician;Nurse;   Physician Team Member Dr Sonya London Team Member Dean Hernandez Management Team Member Nicolasa Craig   Patient/Family Present   Patient Present Yes   Patient's Family Present No   Pt seen for tx team meeting  Pt reports coming to the hospital due to increased depression and anxiety  Pt denied any suicidal thoughts during admission  Pt has been pleasant and cooperative  Pt requesting to be discharged today as his mother was hospitalized and he is his mother's POA  Pt reports he is feeling better and will be able to cope effectively within the community  The team discussed possible discharge and determined pt was safe for discharge  Pt explained he gets pharmacy discounts through his employer at [employer] so he would prefer to take the scripts with him  Pt thankful so that he can help his mother  Pt will be discharged today around 2PM  Tx plan reviewed and signed

## 2019-10-06 ENCOUNTER — HOSPITAL ENCOUNTER (INPATIENT)
Facility: HOSPITAL | Age: 29
LOS: 1 days | Discharge: HOME | DRG: 052 | End: 2019-10-08
Attending: EMERGENCY MEDICINE | Admitting: HOSPITALIST
Payer: COMMERCIAL

## 2019-10-06 DIAGNOSIS — T45.0X1A DIPHENHYDRAMINE OVERDOSE: ICD-10-CM

## 2019-10-06 DIAGNOSIS — T50.901A OVERDOSE: Primary | ICD-10-CM

## 2019-10-06 DIAGNOSIS — N17.9 AKI (ACUTE KIDNEY INJURY) (HCC): ICD-10-CM

## 2019-10-06 DIAGNOSIS — I10 ESSENTIAL HYPERTENSION: ICD-10-CM

## 2019-10-06 DIAGNOSIS — F31.32 BIPOLAR AFFECTIVE DISORDER, CURRENTLY DEPRESSED, MODERATE (HCC): ICD-10-CM

## 2019-10-06 DIAGNOSIS — F41.1 GENERALIZED ANXIETY DISORDER: ICD-10-CM

## 2019-10-06 LAB
ALBUMIN SERPL BCP-MCNC: 4.3 G/DL (ref 3.5–5)
ALP SERPL-CCNC: 118 U/L (ref 46–116)
ALT SERPL W P-5'-P-CCNC: 78 U/L (ref 12–78)
ANION GAP SERPL CALCULATED.3IONS-SCNC: 8 MMOL/L (ref 4–13)
APAP SERPL-MCNC: <2 UG/ML (ref 10–20)
AST SERPL W P-5'-P-CCNC: 37 U/L (ref 5–45)
BASE EXCESS BLDA CALC-SCNC: 0 MMOL/L (ref -2–3)
BASE EXCESS BLDA CALC-SCNC: 3 MMOL/L (ref -2–3)
BASOPHILS # BLD AUTO: 0.04 THOUSANDS/ΜL (ref 0–0.1)
BASOPHILS NFR BLD AUTO: 0 % (ref 0–1)
BILIRUB SERPL-MCNC: 0.62 MG/DL (ref 0.2–1)
BUN SERPL-MCNC: 16 MG/DL (ref 5–25)
CA-I BLD-SCNC: 0.98 MMOL/L (ref 1.12–1.32)
CA-I BLD-SCNC: 1.19 MMOL/L (ref 1.12–1.32)
CALCIUM SERPL-MCNC: 9.5 MG/DL (ref 8.3–10.1)
CHLORIDE SERPL-SCNC: 107 MMOL/L (ref 100–108)
CK SERPL-CCNC: 141 U/L (ref 39–308)
CO2 SERPL-SCNC: 25 MMOL/L (ref 21–32)
CREAT SERPL-MCNC: 1.55 MG/DL (ref 0.6–1.3)
EOSINOPHIL # BLD AUTO: 0.04 THOUSAND/ΜL (ref 0–0.61)
EOSINOPHIL NFR BLD AUTO: 0 % (ref 0–6)
ERYTHROCYTE [DISTWIDTH] IN BLOOD BY AUTOMATED COUNT: 14.2 % (ref 11.6–15.1)
ETHANOL SERPL-MCNC: <3 MG/DL (ref 0–3)
GFR SERPL CREATININE-BSD FRML MDRD: 60 ML/MIN/1.73SQ M
GLUCOSE SERPL-MCNC: 114 MG/DL (ref 65–140)
GLUCOSE SERPL-MCNC: 133 MG/DL (ref 65–140)
GLUCOSE SERPL-MCNC: 142 MG/DL (ref 65–140)
HCO3 BLDA-SCNC: 24.9 MMOL/L (ref 24–30)
HCO3 BLDA-SCNC: 26 MMOL/L (ref 24–30)
HCT VFR BLD AUTO: 40.4 % (ref 36.5–49.3)
HCT VFR BLD CALC: 37 % (ref 36.5–49.3)
HCT VFR BLD CALC: 40 % (ref 36.5–49.3)
HGB BLD-MCNC: 12.8 G/DL (ref 12–17)
HGB BLDA-MCNC: 12.6 G/DL (ref 12–17)
HGB BLDA-MCNC: 13.6 G/DL (ref 12–17)
IMM GRANULOCYTES # BLD AUTO: 0.04 THOUSAND/UL (ref 0–0.2)
IMM GRANULOCYTES NFR BLD AUTO: 0 % (ref 0–2)
LACTATE SERPL-SCNC: 2.6 MMOL/L (ref 0.5–2)
LYMPHOCYTES # BLD AUTO: 1.09 THOUSANDS/ΜL (ref 0.6–4.47)
LYMPHOCYTES NFR BLD AUTO: 10 % (ref 14–44)
MAGNESIUM SERPL-MCNC: 2.3 MG/DL (ref 1.6–2.6)
MCH RBC QN AUTO: 29.6 PG (ref 26.8–34.3)
MCHC RBC AUTO-ENTMCNC: 31.7 G/DL (ref 31.4–37.4)
MCV RBC AUTO: 94 FL (ref 82–98)
MONOCYTES # BLD AUTO: 0.57 THOUSAND/ΜL (ref 0.17–1.22)
MONOCYTES NFR BLD AUTO: 5 % (ref 4–12)
NEUTROPHILS # BLD AUTO: 9.45 THOUSANDS/ΜL (ref 1.85–7.62)
NEUTS SEG NFR BLD AUTO: 85 % (ref 43–75)
NRBC BLD AUTO-RTO: 0 /100 WBCS
PCO2 BLD: 26 MMOL/L (ref 21–32)
PCO2 BLD: 27 MMOL/L (ref 21–32)
PCO2 BLD: 33.6 MM HG (ref 42–50)
PCO2 BLD: 41.2 MM HG (ref 42–50)
PH BLD: 7.39 [PH] (ref 7.3–7.4)
PH BLD: 7.5 [PH] (ref 7.3–7.4)
PLATELET # BLD AUTO: 348 THOUSANDS/UL (ref 149–390)
PMV BLD AUTO: 10.6 FL (ref 8.9–12.7)
PO2 BLD: 73 MM HG (ref 35–45)
PO2 BLD: 78 MM HG (ref 35–45)
POTASSIUM BLD-SCNC: 3.9 MMOL/L (ref 3.5–5.3)
POTASSIUM BLD-SCNC: >8 MMOL/L (ref 3.5–5.3)
POTASSIUM SERPL-SCNC: 4.7 MMOL/L (ref 3.5–5.3)
PROT SERPL-MCNC: 8 G/DL (ref 6.4–8.2)
RBC # BLD AUTO: 4.32 MILLION/UL (ref 3.88–5.62)
SALICYLATES SERPL-MCNC: <3 MG/DL (ref 3–20)
SAO2 % BLD FROM PO2: 94 % (ref 95–98)
SAO2 % BLD FROM PO2: 97 % (ref 95–98)
SODIUM BLD-SCNC: 135 MMOL/L (ref 136–145)
SODIUM BLD-SCNC: 142 MMOL/L (ref 136–145)
SODIUM SERPL-SCNC: 140 MMOL/L (ref 136–145)
SPECIMEN SOURCE: ABNORMAL
SPECIMEN SOURCE: ABNORMAL
WBC # BLD AUTO: 11.23 THOUSAND/UL (ref 4.31–10.16)

## 2019-10-06 PROCEDURE — 80320 DRUG SCREEN QUANTALCOHOLS: CPT | Performed by: EMERGENCY MEDICINE

## 2019-10-06 PROCEDURE — 85014 HEMATOCRIT: CPT

## 2019-10-06 PROCEDURE — 82550 ASSAY OF CK (CPK): CPT | Performed by: EMERGENCY MEDICINE

## 2019-10-06 PROCEDURE — 83735 ASSAY OF MAGNESIUM: CPT | Performed by: EMERGENCY MEDICINE

## 2019-10-06 PROCEDURE — 83605 ASSAY OF LACTIC ACID: CPT | Performed by: EMERGENCY MEDICINE

## 2019-10-06 PROCEDURE — 84295 ASSAY OF SERUM SODIUM: CPT

## 2019-10-06 PROCEDURE — 84132 ASSAY OF SERUM POTASSIUM: CPT

## 2019-10-06 PROCEDURE — 82803 BLOOD GASES ANY COMBINATION: CPT

## 2019-10-06 PROCEDURE — 96374 THER/PROPH/DIAG INJ IV PUSH: CPT

## 2019-10-06 PROCEDURE — 99285 EMERGENCY DEPT VISIT HI MDM: CPT

## 2019-10-06 PROCEDURE — 80329 ANALGESICS NON-OPIOID 1 OR 2: CPT | Performed by: EMERGENCY MEDICINE

## 2019-10-06 PROCEDURE — 80053 COMPREHEN METABOLIC PANEL: CPT | Performed by: EMERGENCY MEDICINE

## 2019-10-06 PROCEDURE — 82330 ASSAY OF CALCIUM: CPT

## 2019-10-06 PROCEDURE — 36415 COLL VENOUS BLD VENIPUNCTURE: CPT | Performed by: EMERGENCY MEDICINE

## 2019-10-06 PROCEDURE — 96361 HYDRATE IV INFUSION ADD-ON: CPT

## 2019-10-06 PROCEDURE — 85025 COMPLETE CBC W/AUTO DIFF WBC: CPT | Performed by: EMERGENCY MEDICINE

## 2019-10-06 PROCEDURE — 96360 HYDRATION IV INFUSION INIT: CPT

## 2019-10-06 PROCEDURE — 82947 ASSAY GLUCOSE BLOOD QUANT: CPT

## 2019-10-06 PROCEDURE — 93005 ELECTROCARDIOGRAM TRACING: CPT

## 2019-10-06 RX ORDER — LORAZEPAM 2 MG/ML
2 INJECTION INTRAMUSCULAR ONCE
Status: COMPLETED | OUTPATIENT
Start: 2019-10-06 | End: 2019-10-06

## 2019-10-06 RX ORDER — LIDOCAINE HYDROCHLORIDE 20 MG/ML
1 JELLY TOPICAL ONCE
Status: COMPLETED | OUTPATIENT
Start: 2019-10-06 | End: 2019-10-06

## 2019-10-06 RX ADMIN — SODIUM CHLORIDE 1000 ML: 0.9 INJECTION, SOLUTION INTRAVENOUS at 23:06

## 2019-10-06 RX ADMIN — SODIUM CHLORIDE 1000 ML: 0.9 INJECTION, SOLUTION INTRAVENOUS at 23:00

## 2019-10-06 RX ADMIN — LIDOCAINE HYDROCHLORIDE 1 APPLICATION: 20 JELLY TOPICAL at 23:12

## 2019-10-06 RX ADMIN — LORAZEPAM 2 MG: 2 INJECTION INTRAMUSCULAR; INTRAVENOUS at 23:10

## 2019-10-07 ENCOUNTER — APPOINTMENT (INPATIENT)
Dept: RADIOLOGY | Facility: HOSPITAL | Age: 29
DRG: 052 | End: 2019-10-07
Attending: INTERNAL MEDICINE
Payer: COMMERCIAL

## 2019-10-07 PROBLEM — I10 ESSENTIAL HYPERTENSION: Chronic | Status: ACTIVE | Noted: 2018-12-09

## 2019-10-07 PROBLEM — E03.9 ACQUIRED HYPOTHYROIDISM: Chronic | Status: ACTIVE | Noted: 2018-12-13

## 2019-10-07 PROBLEM — G93.41 ACUTE METABOLIC ENCEPHALOPATHY: Status: ACTIVE | Noted: 2018-12-09

## 2019-10-07 PROBLEM — F31.32 BIPOLAR AFFECTIVE DISORDER, CURRENTLY DEPRESSED, MODERATE (HCC): Chronic | Status: ACTIVE | Noted: 2018-12-12

## 2019-10-07 PROBLEM — E11.9 TYPE 2 DIABETES MELLITUS WITHOUT COMPLICATION, WITHOUT LONG-TERM CURRENT USE OF INSULIN (HCC): Chronic | Status: ACTIVE | Noted: 2018-12-13

## 2019-10-07 PROBLEM — F19.10 POLYSUBSTANCE ABUSE (HCC): Chronic | Status: ACTIVE | Noted: 2018-12-18

## 2019-10-07 PROBLEM — N17.9 AKI (ACUTE KIDNEY INJURY) (HCC): Status: ACTIVE | Noted: 2019-10-07

## 2019-10-07 PROBLEM — R50.9 HYPERTHERMIA: Status: ACTIVE | Noted: 2019-10-07

## 2019-10-07 PROBLEM — E87.2 LACTIC ACIDOSIS: Status: ACTIVE | Noted: 2019-10-07

## 2019-10-07 PROBLEM — F32.A DEPRESSION: Chronic | Status: ACTIVE | Noted: 2018-12-10

## 2019-10-07 LAB
AMPHETAMINES SERPL QL SCN: NEGATIVE
ANION GAP SERPL CALCULATED.3IONS-SCNC: 6 MMOL/L (ref 4–13)
ATRIAL RATE: 133 BPM
ATRIAL RATE: 141 BPM
BACTERIA UR QL AUTO: ABNORMAL /HPF
BARBITURATES UR QL: POSITIVE
BENZODIAZ UR QL: POSITIVE
BILIRUB UR QL STRIP: NEGATIVE
BUN SERPL-MCNC: 13 MG/DL (ref 5–25)
CALCIUM SERPL-MCNC: 8.3 MG/DL (ref 8.3–10.1)
CHLORIDE SERPL-SCNC: 111 MMOL/L (ref 100–108)
CLARITY UR: CLEAR
CO2 SERPL-SCNC: 26 MMOL/L (ref 21–32)
COCAINE UR QL: NEGATIVE
COLOR UR: YELLOW
CREAT SERPL-MCNC: 1.12 MG/DL (ref 0.6–1.3)
ERYTHROCYTE [DISTWIDTH] IN BLOOD BY AUTOMATED COUNT: 14.6 % (ref 11.6–15.1)
GFR SERPL CREATININE-BSD FRML MDRD: 88 ML/MIN/1.73SQ M
GLUCOSE SERPL-MCNC: 108 MG/DL (ref 65–140)
GLUCOSE SERPL-MCNC: 109 MG/DL (ref 65–140)
GLUCOSE SERPL-MCNC: 110 MG/DL (ref 65–140)
GLUCOSE SERPL-MCNC: 91 MG/DL (ref 65–140)
GLUCOSE SERPL-MCNC: 93 MG/DL (ref 65–140)
GLUCOSE UR STRIP-MCNC: NEGATIVE MG/DL
HCT VFR BLD AUTO: 34.2 % (ref 36.5–49.3)
HGB BLD-MCNC: 10.6 G/DL (ref 12–17)
HGB UR QL STRIP.AUTO: ABNORMAL
HYALINE CASTS #/AREA URNS LPF: ABNORMAL /LPF
KETONES UR STRIP-MCNC: NEGATIVE MG/DL
LACTATE SERPL-SCNC: 1 MMOL/L (ref 0.5–2)
LEUKOCYTE ESTERASE UR QL STRIP: NEGATIVE
MAGNESIUM SERPL-MCNC: 2.3 MG/DL (ref 1.6–2.6)
MCH RBC QN AUTO: 29.6 PG (ref 26.8–34.3)
MCHC RBC AUTO-ENTMCNC: 31 G/DL (ref 31.4–37.4)
MCV RBC AUTO: 96 FL (ref 82–98)
METHADONE UR QL: NEGATIVE
NITRITE UR QL STRIP: NEGATIVE
NON-SQ EPI CELLS URNS QL MICRO: ABNORMAL /HPF
OPIATES UR QL SCN: POSITIVE
P AXIS: 49 DEGREES
P AXIS: 52 DEGREES
PCP UR QL: NEGATIVE
PH UR STRIP.AUTO: 6 [PH]
PHOSPHATE SERPL-MCNC: 2.4 MG/DL (ref 2.7–4.5)
PLATELET # BLD AUTO: 263 THOUSANDS/UL (ref 149–390)
PMV BLD AUTO: 11.2 FL (ref 8.9–12.7)
POTASSIUM SERPL-SCNC: 3.7 MMOL/L (ref 3.5–5.3)
PR INTERVAL: 114 MS
PR INTERVAL: 116 MS
PROT UR STRIP-MCNC: NEGATIVE MG/DL
QRS AXIS: -8 DEGREES
QRS AXIS: 9 DEGREES
QRSD INTERVAL: 84 MS
QRSD INTERVAL: 88 MS
QT INTERVAL: 278 MS
QT INTERVAL: 294 MS
QTC INTERVAL: 425 MS
QTC INTERVAL: 437 MS
RBC # BLD AUTO: 3.58 MILLION/UL (ref 3.88–5.62)
RBC #/AREA URNS AUTO: ABNORMAL /HPF
SODIUM SERPL-SCNC: 143 MMOL/L (ref 136–145)
SP GR UR STRIP.AUTO: 1.01 (ref 1–1.03)
T WAVE AXIS: -11 DEGREES
T WAVE AXIS: 11 DEGREES
THC UR QL: NEGATIVE
TSH SERPL DL<=0.05 MIU/L-ACNC: 1.76 UIU/ML (ref 0.36–3.74)
UROBILINOGEN UR QL STRIP.AUTO: 0.2 E.U./DL
VENTRICULAR RATE: 133 BPM
VENTRICULAR RATE: 141 BPM
WBC # BLD AUTO: 11.05 THOUSAND/UL (ref 4.31–10.16)
WBC #/AREA URNS AUTO: ABNORMAL /HPF

## 2019-10-07 PROCEDURE — 83735 ASSAY OF MAGNESIUM: CPT | Performed by: HOSPITALIST

## 2019-10-07 PROCEDURE — 99221 1ST HOSP IP/OBS SF/LOW 40: CPT | Performed by: NURSE PRACTITIONER

## 2019-10-07 PROCEDURE — 82948 REAGENT STRIP/BLOOD GLUCOSE: CPT

## 2019-10-07 PROCEDURE — 36415 COLL VENOUS BLD VENIPUNCTURE: CPT | Performed by: EMERGENCY MEDICINE

## 2019-10-07 PROCEDURE — NC001 PR NO CHARGE: Performed by: EMERGENCY MEDICINE

## 2019-10-07 PROCEDURE — 96361 HYDRATE IV INFUSION ADD-ON: CPT

## 2019-10-07 PROCEDURE — 99254 IP/OBS CNSLTJ NEW/EST MOD 60: CPT | Performed by: EMERGENCY MEDICINE

## 2019-10-07 PROCEDURE — 80307 DRUG TEST PRSMV CHEM ANLYZR: CPT | Performed by: HOSPITALIST

## 2019-10-07 PROCEDURE — 85027 COMPLETE CBC AUTOMATED: CPT | Performed by: HOSPITALIST

## 2019-10-07 PROCEDURE — 99285 EMERGENCY DEPT VISIT HI MDM: CPT | Performed by: EMERGENCY MEDICINE

## 2019-10-07 PROCEDURE — 84100 ASSAY OF PHOSPHORUS: CPT | Performed by: HOSPITALIST

## 2019-10-07 PROCEDURE — 81001 URINALYSIS AUTO W/SCOPE: CPT | Performed by: HOSPITALIST

## 2019-10-07 PROCEDURE — 71045 X-RAY EXAM CHEST 1 VIEW: CPT

## 2019-10-07 PROCEDURE — 99223 1ST HOSP IP/OBS HIGH 75: CPT | Performed by: HOSPITALIST

## 2019-10-07 PROCEDURE — 83605 ASSAY OF LACTIC ACID: CPT | Performed by: EMERGENCY MEDICINE

## 2019-10-07 PROCEDURE — 99245 OFF/OP CONSLTJ NEW/EST HI 55: CPT | Performed by: EMERGENCY MEDICINE

## 2019-10-07 PROCEDURE — 80048 BASIC METABOLIC PNL TOTAL CA: CPT | Performed by: HOSPITALIST

## 2019-10-07 PROCEDURE — 93010 ELECTROCARDIOGRAM REPORT: CPT | Performed by: INTERNAL MEDICINE

## 2019-10-07 PROCEDURE — 84443 ASSAY THYROID STIM HORMONE: CPT | Performed by: HOSPITALIST

## 2019-10-07 RX ORDER — ARIPIPRAZOLE 10 MG/1
10 TABLET ORAL
Status: DISCONTINUED | OUTPATIENT
Start: 2019-10-07 | End: 2019-10-08 | Stop reason: HOSPADM

## 2019-10-07 RX ORDER — LORAZEPAM 2 MG/ML
1 INJECTION INTRAMUSCULAR EVERY 2 HOUR PRN
Status: DISCONTINUED | OUTPATIENT
Start: 2019-10-07 | End: 2019-10-08 | Stop reason: HOSPADM

## 2019-10-07 RX ORDER — POLYETHYLENE GLYCOL 3350 17 G/17G
17 POWDER, FOR SOLUTION ORAL DAILY PRN
Status: DISCONTINUED | OUTPATIENT
Start: 2019-10-07 | End: 2019-10-08 | Stop reason: HOSPADM

## 2019-10-07 RX ORDER — LEVOTHYROXINE SODIUM 0.03 MG/1
25 TABLET ORAL
Status: DISCONTINUED | OUTPATIENT
Start: 2019-10-07 | End: 2019-10-08 | Stop reason: HOSPADM

## 2019-10-07 RX ORDER — NICOTINE 21 MG/24HR
1 PATCH, TRANSDERMAL 24 HOURS TRANSDERMAL DAILY
Status: DISCONTINUED | OUTPATIENT
Start: 2019-10-07 | End: 2019-10-08 | Stop reason: HOSPADM

## 2019-10-07 RX ORDER — DOCUSATE SODIUM 100 MG/1
100 CAPSULE, LIQUID FILLED ORAL 2 TIMES DAILY
Status: DISCONTINUED | OUTPATIENT
Start: 2019-10-07 | End: 2019-10-08 | Stop reason: HOSPADM

## 2019-10-07 RX ORDER — ACETAMINOPHEN 325 MG/1
650 TABLET ORAL EVERY 6 HOURS PRN
Status: DISCONTINUED | OUTPATIENT
Start: 2019-10-07 | End: 2019-10-08 | Stop reason: HOSPADM

## 2019-10-07 RX ORDER — HYDRALAZINE HYDROCHLORIDE 20 MG/ML
5 INJECTION INTRAMUSCULAR; INTRAVENOUS EVERY 6 HOURS PRN
Status: DISCONTINUED | OUTPATIENT
Start: 2019-10-07 | End: 2019-10-08 | Stop reason: HOSPADM

## 2019-10-07 RX ORDER — ALBUTEROL SULFATE 90 UG/1
2 AEROSOL, METERED RESPIRATORY (INHALATION) EVERY 4 HOURS PRN
Status: DISCONTINUED | OUTPATIENT
Start: 2019-10-07 | End: 2019-10-08 | Stop reason: HOSPADM

## 2019-10-07 RX ORDER — SODIUM CHLORIDE, SODIUM LACTATE, POTASSIUM CHLORIDE, CALCIUM CHLORIDE 600; 310; 30; 20 MG/100ML; MG/100ML; MG/100ML; MG/100ML
125 INJECTION, SOLUTION INTRAVENOUS CONTINUOUS
Status: DISPENSED | OUTPATIENT
Start: 2019-10-07 | End: 2019-10-07

## 2019-10-07 RX ORDER — NICOTINE 21 MG/24HR
1 PATCH, TRANSDERMAL 24 HOURS TRANSDERMAL DAILY
Status: DISCONTINUED | OUTPATIENT
Start: 2019-10-07 | End: 2019-10-07

## 2019-10-07 RX ORDER — FLUTICASONE FUROATE AND VILANTEROL 100; 25 UG/1; UG/1
1 POWDER RESPIRATORY (INHALATION)
Status: DISCONTINUED | OUTPATIENT
Start: 2019-10-07 | End: 2019-10-08 | Stop reason: HOSPADM

## 2019-10-07 RX ORDER — PANTOPRAZOLE SODIUM 20 MG/1
20 TABLET, DELAYED RELEASE ORAL
Status: DISCONTINUED | OUTPATIENT
Start: 2019-10-07 | End: 2019-10-08 | Stop reason: HOSPADM

## 2019-10-07 RX ORDER — ONDANSETRON 2 MG/ML
4 INJECTION INTRAMUSCULAR; INTRAVENOUS EVERY 6 HOURS PRN
Status: DISCONTINUED | OUTPATIENT
Start: 2019-10-07 | End: 2019-10-08 | Stop reason: HOSPADM

## 2019-10-07 RX ORDER — CLONIDINE HYDROCHLORIDE 0.1 MG/1
0.1 TABLET ORAL
Status: DISCONTINUED | OUTPATIENT
Start: 2019-10-07 | End: 2019-10-08 | Stop reason: HOSPADM

## 2019-10-07 RX ORDER — SENNOSIDES 8.6 MG
1 TABLET ORAL DAILY
Status: DISCONTINUED | OUTPATIENT
Start: 2019-10-07 | End: 2019-10-08 | Stop reason: HOSPADM

## 2019-10-07 RX ORDER — CARVEDILOL 6.25 MG/1
6.25 TABLET ORAL 2 TIMES DAILY WITH MEALS
Status: DISCONTINUED | OUTPATIENT
Start: 2019-10-07 | End: 2019-10-08 | Stop reason: HOSPADM

## 2019-10-07 RX ADMIN — SODIUM CHLORIDE, SODIUM LACTATE, POTASSIUM CHLORIDE, AND CALCIUM CHLORIDE 125 ML/HR: .6; .31; .03; .02 INJECTION, SOLUTION INTRAVENOUS at 03:02

## 2019-10-07 RX ADMIN — LEVOTHYROXINE SODIUM 25 MCG: 25 TABLET ORAL at 05:09

## 2019-10-07 RX ADMIN — ARIPIPRAZOLE 10 MG: 10 TABLET ORAL at 21:35

## 2019-10-07 RX ADMIN — CLONIDINE HYDROCHLORIDE 0.1 MG: 0.1 TABLET ORAL at 21:35

## 2019-10-07 RX ADMIN — PANTOPRAZOLE SODIUM 20 MG: 20 TABLET, DELAYED RELEASE ORAL at 05:39

## 2019-10-07 RX ADMIN — CARVEDILOL 6.25 MG: 6.25 TABLET, FILM COATED ORAL at 07:57

## 2019-10-07 RX ADMIN — SODIUM CHLORIDE 1000 ML: 0.9 INJECTION, SOLUTION INTRAVENOUS at 00:45

## 2019-10-07 RX ADMIN — SODIUM CHLORIDE, SODIUM LACTATE, POTASSIUM CHLORIDE, AND CALCIUM CHLORIDE 125 ML/HR: .6; .31; .03; .02 INJECTION, SOLUTION INTRAVENOUS at 11:08

## 2019-10-07 RX ADMIN — CARVEDILOL 6.25 MG: 6.25 TABLET, FILM COATED ORAL at 16:41

## 2019-10-07 NOTE — UTILIZATION REVIEW
Initial Clinical Review    Admission: Date/Time/Statement: Inpatient Admission Orders (From admission, onward)     Ordered        10/07/19 0130  Inpatient Admission  Once                Orders Placed This Encounter   Procedures    Inpatient Admission     Standing Status:   Standing     Number of Occurrences:   1     Order Specific Question:   Admitting Physician     Answer:   Swathi Acevedo     Order Specific Question:   Level of Care     Answer:   Level 1 Stepdown [13]     Order Specific Question:   Estimated length of stay     Answer:   More than 2 Midnights     Order Specific Question:   Certification     Answer:   I certify that inpatient services are medically necessary for this patient for a duration of greater than two midnights  See H&P and MD Progress Notes for additional information about the patient's course of treatment  ED Arrival Information     Expected Arrival Acuity Means of Arrival Escorted By Service Admission Type    - 10/6/2019 22:09 Emergent Ambulance [facility] [address]  Emergency    Arrival Complaint    Overdose     Chief Complaint   Patient presents with    Overdose - Intentional     pt states he just wanted to sleep so he took 200mg benydral 10mg lena and poss xanxa apx 3 hours ago  yen PULIDO HI at this time  states that theres a lot of drama in his home amd family at this time  was seen for SI last week     Assessment/Plan: 34year old male with PMHx polysubstance abuse, bipolar ,depression  EMS to [facility] ED 2nd altered mental status after acute polypharmacy overdose, intention unknown  In ED denied intentional overdose + stated 2nd insomnia - took a combination of Benadryl approximately 200 mg , Xanax and melatonin  In the ED - fever spiked to 103F, noted to be tachycardic to 150 and tachypneic  Lactic acid 2 6,   Creatinine 1 55  (Baseline 0 87 on September 30)      UDS + Barbituates, Benzodiazepines + Opiates   No EKG changes per H+P  ED TX - IVF NSS BOLUS X 6 L, IV ATIVAN X 1    ADMITTED INPATIENT TO LEVEL 1 STEPDOWN 10/7/19 AT 0130 2ND TO ACUTE DIPHENHYDRAMINE + ALPRAZOLAM OVERDOSE WITH ENCEPHALOPATHY + TACHYCARDIA, BRANT    TX - TELEMETRY, VS + NEURO CHECKS Q1-2HRS, 1:1 CONTINUOUS OBSERVATION, MEDICAL TOXICOLOGY CONSULTED    ED Triage Vitals   Temperature Pulse Respirations Blood Pressure SpO2   10/06/19 2228 10/06/19 2218 10/06/19 2218 10/06/19 2218 10/06/19 2218   (!) 102 7 °F (39 3 °C) (!) 145 16 127/60 95 %      Temp Source Heart Rate Source Patient Position - Orthostatic VS BP Location FiO2 (%)   10/06/19 2228 10/07/19 0314 10/07/19 0314 10/07/19 0314 --   Rectal Radial Lying Left arm     Wt Readings from Last 1 Encounters:   10/07/19 123 kg (270 lb 1 oz)     Additional Vital Signs:   10/07/19 0314  98 6 °F (37 °C)  108  24Abnormal   135/68  96 %  Nasal cannula    10/07/19 0215  100 °F (37 8 °C)  108Abnormal   25Abnormal   131/64  97 %      10/07/19 0145  100 °F (37 8 °C)  110Abnormal   21  132/67  100 %      10/07/19 0115  100 4 °F (38 °C)  116Abnormal   29Abnormal   133/77  97 %        Pertinent Labs/Diagnostic Test Results:   Lab Units 10/07/19  0501 10/06/19  2259 10/06/19  2228 10/06/19  2227   WBC Thousand/uL 11 05*  --   --  11 23*   HEMOGLOBIN g/dL 10 6*  --   --  12 8   I STAT HEMOGLOBIN g/dl  --  12 6 13 6  --    HEMATOCRIT % 34 2*  --   --  40 4   HEMATOCRIT, ISTAT %  --  37 40  --    PLATELETS Thousands/uL 263  --   --  348   NEUTROS ABS Thousands/µL  --   --   --  9 45*     Lab Units 10/07/19  0500 10/06/19  2259 10/06/19  2228 10/06/19  2227   SODIUM mmol/L 143  --   --  140   POTASSIUM mmol/L 3 7  --   --  4 7   CHLORIDE mmol/L 111*  --   --  107   CO2 mmol/L 26  --   --  25   CO2, I-STAT mmol/L  --  26 27  --    ANION GAP mmol/L 6  --   --  8   BUN mg/dL 13  --   --  16   CREATININE mg/dL 1 12  --   --  1 55*   EGFR ml/min/1 73sq m 88  --   --  60   CALCIUM mg/dL 8 3  --   --  9 5   CALCIUM, IONIZED, ISTAT mmol/L  --  1 19 0 98*  -- MAGNESIUM mg/dL 2 3  --   --  2 3   PHOSPHORUS mg/dL 2 4*  --   --   --      Results from last 7 days   Lab Units 10/06/19  2227 10/02/19  0600 09/30/19  1843   AST U/L 37 73* 184*   ALT U/L 78 150* 229*   ALK PHOS U/L 118* 115 138*   TOTAL PROTEIN g/dL 8 0 8 0 7 6   ALBUMIN g/dL 4 3 4 2 3 8   TOTAL BILIRUBIN mg/dL 0 62 0 40 0 15*   BILIRUBIN DIRECT mg/dL  --  0 20  --      Lab Units 10/07/19  1109 10/07/19  0643   POC GLUCOSE mg/dl 108 109     Results from last 7 days   Lab Units 10/06/19  2259 10/06/19  2228   PH, IQRA I-STAT  7 390 7 498*   PCO2, IQRA ISTAT mm HG 41 2* 33 6*   PO2, IQRA ISTAT mm HG 73 0* 78 0*   HCO3, IQRA ISTAT mmol/L 24 9 26 0   I STAT BASE EXC mmol/L 0 3   I STAT O2 SAT % 94* 97     Results from last 7 days   Lab Units 10/06/19  2227   CK TOTAL U/L 141     Results from last 7 days   Lab Units 10/07/19  0500 10/02/19  0600   TSH 3RD GENERATON uIU/mL 1 760 0 309*     Results from last 7 days   Lab Units 10/06/19  2227   LACTIC ACID mmol/L 2 6*     Results from last 7 days   Lab Units 10/07/19  0231   AMPH/METH  Negative   BARBITURATE UR  Positive*   BENZODIAZEPINE UR  Positive*   COCAINE UR  Negative   METHADONE URINE  Negative   OPIATE UR  Positive*   PCP UR  Negative   THC UR  Negative     ED Treatment:   Medication Administration from 10/06/2019 2209 to 10/07/2019 0245       Date/Time Order Dose Route Action     10/07/2019 0045 sodium chloride 0 9 % bolus 1,000 mL 0 mL Intravenous Stopped     10/06/2019 2300 sodium chloride 0 9 % bolus 1,000 mL 1,000 mL Intravenous New Bag     10/06/2019 2310 LORazepam (ATIVAN) 2 mg/mL injection 2 mg 2 mg Intravenous Given     10/06/2019 2312 lidocaine (URO-JET) 2 % urethral/mucosal gel 1 application 1 application Urethral Given     10/07/2019 0225 sodium chloride 0 9 % bolus 1,000 mL 0 mL Intravenous Stopped     10/07/2019 0045 sodium chloride 0 9 % bolus 1,000 mL 1,000 mL Intravenous New Bag     10/07/2019 0045 sodium chloride 0 9 % bolus 1,000 mL 0 mL Intravenous Stopped     10/06/2019 2306 sodium chloride 0 9 % bolus 1,000 mL 1,000 mL Intravenous New Bag     Past Medical History:   Diagnosis Date    Anxiety     Asthma     COPD (chronic obstructive pulmonary disease) ([place] [address] )     Depression     Diabetes mellitus ([place] [address] )     Disease of thyroid gland     Hypertension     IBS (irritable bowel syndrome)     Psychiatric disorder     Bipolar Disorder    Psychiatric illness      Present on Admission:   Bipolar affective disorder, currently depressed, moderate ([place] [address] )   Essential hypertension   Overdose   Type 2 diabetes mellitus without complication, without long-term current use of insulin ([facility]   BRANT (acute kidney injury) ([place] [address] )   Lactic acidosis   Acute metabolic encephalopathy   Hyperthermia   Acquired hypothyroidism    Admitting Diagnosis: Overdose [T50 901A]  Diphenhydramine overdose [T45 0X1A]  BRANT (acute kidney injury) ([place] [address] ) [N17 9]    Age/Sex: 34 y o  male    Admission Orders:  TELEMETRY  VS + NEURO CHECKS Q1-2HRS  1:1 CONTINUOUS OBSERVATION  Diet [place]/CHO Controlled; Consistent Carbohydrate Diet Level 1 (4 carb servings/60 grams CHO/meal)    Current Facility-Administered Medications:  acetaminophen 650 mg Oral Q6H PRN   albuterol 2 puff Inhalation Q4H PRN   carvedilol 6 25 mg Oral BID With Meals   docusate sodium 100 mg Oral BID   fluticasone-vilanterol 1 puff Inhalation Daily   hydrALAZINE 5 mg Intravenous Q6H PRN   influenza vaccine 0 5 mL Intramuscular Once   insulin lispro 2-12 Units Subcutaneous TID AC   insulin lispro 2-12 Units Subcutaneous HS   lactated ringers 125 mL/hr Intravenous Continuous   levothyroxine 25 mcg Oral Early Morning   LORazepam 1 mg Intravenous Q2H PRN   nicotine 1 patch Transdermal Daily   ondansetron 4 mg Intravenous Q6H PRN   pantoprazole 20 mg Oral Early Morning   polyethylene glycol 17 g Oral Daily PRN   senna 1 tablet Oral Daily     IP CONSULT TO TOXICOLOGY  IP CONSULT TO MEDICAL CRITICAL CARE  IP CONSULT TO PSYCHIATRY    Network Utilization Review Department  Phone: [phone]; Fax [phone]  [email or web address]  ATTENTION: Please call with any questions or concerns to [phone]  and carefully listen to the prompts so that you are directed to the right person  Send all requests for admission clinical reviews, approved or denied determinations and any other requests to fax [phone]   All voicemails are confidential

## 2019-10-07 NOTE — ASSESSMENT & PLAN NOTE
Continue Coreg, lisinopril and hydrochlorothiazide when blood pressure improves  Would restart 1 at the time depending on the blood pressure tomorrow

## 2019-10-07 NOTE — SPEECH THERAPY NOTE
Speech Language/Pathology    Speech/Language Pathology Progress Note    Patient Name: Teodora Deras  MYGBW'E Date: 10/7/2019    Consult received and chart reviewed  Pt passed RN dysphagia screen, spoke c RN, reported good tolerance for regular diet c thin liquids  Formal evaluation not warranted at this time

## 2019-10-07 NOTE — PLAN OF CARE
Problem: PAIN - ADULT  Goal: Verbalizes/displays adequate comfort level or baseline comfort level  Description  Interventions:  - Encourage patient to monitor pain and request assistance  - Assess pain using appropriate pain scale  - Administer analgesics based on type and severity of pain and evaluate response  - Implement non-pharmacological measures as appropriate and evaluate response  - Consider cultural and social influences on pain and pain management  - Notify physician/advanced practitioner if interventions unsuccessful or patient reports new pain  Outcome: Progressing     Problem: INFECTION - ADULT  Goal: Absence or prevention of progression during hospitalization  Description  INTERVENTIONS:  - Assess and monitor for signs and symptoms of infection  - Monitor lab/diagnostic results  - Monitor all insertion sites, i e  indwelling lines, tubes, and drains  - Monitor endotracheal if appropriate and nasal secretions for changes in amount and color  - [place] appropriate cooling/warming therapies per order  - Administer medications as ordered  - Instruct and encourage patient and family to use good hand hygiene technique  - Identify and instruct in appropriate isolation precautions for identified infection/condition  Outcome: Progressing  Goal: Absence of fever/infection during neutropenic period  Description  INTERVENTIONS:  - Monitor WBC    Outcome: Progressing     Problem: SAFETY ADULT  Goal: Patient will remain free of falls  Description  INTERVENTIONS:  - Assess patient frequently for physical needs  -  Identify cognitive and physical deficits and behaviors that affect risk of falls    -  [place] fall precautions as indicated by assessment   - Educate patient/family on patient safety including physical limitations  - Instruct patient to call for assistance with activity based on assessment  - Modify environment to reduce risk of injury  - Consider OT/PT consult to assist with strengthening/mobility  Outcome: Progressing  Goal: Maintain or return to baseline ADL function  Description  INTERVENTIONS:  -  Assess patient's ability to carry out ADLs; assess patient's baseline for ADL function and identify physical deficits which impact ability to perform ADLs (bathing, care of mouth/teeth, toileting, grooming, dressing, etc )  - Assess/evaluate cause of self-care deficits   - Assess range of motion  - Assess patient's mobility; develop plan if impaired  - Assess patient's need for assistive devices and provide as appropriate  - Encourage maximum independence but intervene and supervise when necessary  - Involve family in performance of ADLs  - Assess for home care needs following discharge   - Consider OT consult to assist with ADL evaluation and planning for discharge  - Provide patient education as appropriate  Outcome: Progressing  Goal: Maintain or return mobility status to optimal level  Description  INTERVENTIONS:  - Assess patient's baseline mobility status (ambulation, transfers, stairs, etc )    - Identify cognitive and physical deficits and behaviors that affect mobility  - Identify mobility aids required to assist with transfers and/or ambulation (gait belt, sit-to-stand, lift, walker, cane, etc )  - [place] fall precautions as indicated by assessment  - Record patient progress and toleration of activity level on Mobility SBAR; progress patient to next Phase/Stage  - Instruct patient to call for assistance with activity based on assessment  - Consider rehabilitation consult to assist with strengthening/weightbearing, etc   Outcome: Progressing     Problem: DISCHARGE PLANNING  Goal: Discharge to home or other facility with appropriate resources  Description  INTERVENTIONS:  - Identify barriers to discharge w/patient and caregiver  - Arrange for needed discharge resources and transportation as appropriate  - Identify discharge learning needs (meds, wound care, etc )  - Arrange for interpretive services to assist at discharge as needed  - Refer to Case Management Department for coordinating discharge planning if the patient needs post-hospital services based on physician/advanced practitioner order or complex needs related to functional status, cognitive ability, or social support system  Outcome: Progressing     Problem: Knowledge Deficit  Goal: Patient/family/caregiver demonstrates understanding of disease process, treatment plan, medications, and discharge instructions  Description  Complete learning assessment and assess knowledge base    Interventions:  - Provide teaching at level of understanding  - Provide teaching via preferred learning methods  Outcome: Progressing

## 2019-10-07 NOTE — ASSESSMENT & PLAN NOTE
Continue hydralazine IV p r n  and Coreg p o    Due to BRANT hold lisinopril and hydrochlorothiazide

## 2019-10-07 NOTE — ASSESSMENT & PLAN NOTE
Lab Results   Component Value Date    HGBA1C 6 2 10/02/2019       No results for input(s): POCGLU in the last 72 hours      Blood Sugar Average: Last 72 hrs:  - continue to monitor glucose closely  - patient's last hemoglobin A1c was 6 2 it is elevated  - blood glucose today is 101

## 2019-10-07 NOTE — ED PROVIDER NOTES
History  Chief Complaint   Patient presents with    Overdose - Intentional     pt states he just wanted to sleep so he took 200mg benydral 10mg lena and poss xanxa apx 3 hours ago  yen CHU at this time  states that theres a lot of drama in his home amd family at this time  was seen for SI last week     66-year-old male with history of depression, anxiety, requiring inpatient stay for psychiatric reasons presenting after he reportedly took 200 mg of Benadryl he thinks about 3 hours ago, although he is not sure  Patient also says that he took some melatonin, EMS says that his girlfriend believe she may have took some of his Xanax although she is not sure how much  Patient is tachycardic up to 150, blood pressure was noted to be in the low 110s by EMS  Patient has been awake and arousable, although sometimes appearing drowsy and closing his eyes  Patient does know where he is, understands what has happened  He says he was just trying to get some sleep  Denies any suicidal attempt or suicidal thoughts at this time  He does not have any complaints right now, says he feels fine  Denies any recent illness, chills, fevers, cough, chest pain, difficulty breathing, changes in bowel or bladder habits  Says that all he took today otherwise was a 1000 mg of Tylenol  Denies any other alcohol or drug use today  History provided by:  Patient and EMS personnel   used: No        Prior to Admission Medications   Prescriptions Last Dose Informant Patient Reported? Taking?    ARIPiprazole (ABILIFY) 5 mg tablet   No No   Sig: Take 1 tablet (5 mg total) by mouth daily   albuterol (PROVENTIL HFA,VENTOLIN HFA) 90 mcg/act inhaler   No No   Sig: Inhale 2 puffs every 4 (four) hours as needed for wheezing   carvedilol (COREG) 6 25 mg tablet   No No   Sig: Take 1 tablet (6 25 mg total) by mouth 2 (two) times a day with meals   fluticasone-salmeterol (ADVAIR HFA) 45-21 MCG/ACT inhaler   No No   Sig: Inhale 2 puffs 2 (two) times a day Rinse mouth after use    hydrochlorothiazide (HYDRODIURIL) 12 5 mg tablet   No No   Sig: Take 1 tablet (12 5 mg total) by mouth daily   levothyroxine 25 mcg tablet   No No   Sig: Take 1 tablet (25 mcg total) by mouth daily in the early morning   lisinopril (ZESTRIL) 20 mg tablet   No No   Sig: Take 1 tablet (20 mg total) by mouth daily   nicotine (NICODERM CQ) 21 mg/24 hr TD 24 hr patch   No No   Sig: Place 1 patch on the skin daily   pantoprazole (PROTONIX) 20 mg tablet   No No   Sig: Take 1 tablet (20 mg total) by mouth daily in the early morning   sertraline (ZOLOFT) 50 mg tablet   No No   Sig: Take 1 tablet (50 mg total) by mouth daily   traZODone (DESYREL) 50 mg tablet   No No   Sig: Take 1 tablet (50 mg total) by mouth daily at bedtime as needed for sleep      Facility-Administered Medications: None       Past Medical History:   Diagnosis Date    Anxiety     Asthma     COPD (chronic obstructive pulmonary disease) (HCC)     Depression     Diabetes mellitus (HCC)     Disease of thyroid gland     Hypertension     IBS (irritable bowel syndrome)     Psychiatric disorder     Bipolar Disorder    Psychiatric illness        Past Surgical History:   Procedure Laterality Date    KNEE SURGERY Right     TONSILLECTOMY         Family History   Problem Relation Age of Onset    Anxiety disorder Mother     Depression Mother     Bipolar disorder Mother     Anxiety disorder Maternal Aunt     Anxiety disorder Maternal Grandmother      I have reviewed and agree with the history as documented      Social History     Tobacco Use    Smoking status: Smoker, Current Status Unknown     Packs/day: 1 00     Types: Cigarettes    Smokeless tobacco: Never Used   Substance Use Topics    Alcohol use: No     Comment: denies     Drug use: Yes     Types: MDMA (ecstacy), Marijuana, Methamphetamines, Cocaine     Comment: reports recent use (overdose) only         Review of Systems Constitutional: Negative for chills, diaphoresis, fatigue and fever  HENT: Negative for congestion, rhinorrhea, sore throat, trouble swallowing and voice change  Eyes: Positive for redness  Negative for photophobia and visual disturbance  Respiratory: Negative for cough, chest tightness, shortness of breath and wheezing  Cardiovascular: Negative for chest pain and palpitations  Gastrointestinal: Negative for abdominal pain, blood in stool, constipation, diarrhea, nausea and vomiting  Genitourinary: Negative for dysuria, frequency, hematuria and urgency  Musculoskeletal: Positive for gait problem  Negative for arthralgias and back pain  Skin: Negative for pallor, rash and wound  Neurological: Positive for light-headedness (currently, not at presentation)  Negative for dizziness, syncope, weakness, numbness and headaches  Psychiatric/Behavioral: Positive for dysphoric mood  Negative for confusion  The patient is not nervous/anxious  Physical Exam  ED Triage Vitals   Temperature Pulse Respirations Blood Pressure SpO2   10/06/19 2228 10/06/19 2218 10/06/19 2218 10/06/19 2218 10/06/19 2218   (!) 102 7 °F (39 3 °C) (!) 145 16 127/60 95 %      Temp Source Heart Rate Source Patient Position - Orthostatic VS BP Location FiO2 (%)   10/06/19 2228 -- -- -- --   Rectal          Pain Score       10/06/19 2218       No Pain             Orthostatic Vital Signs  Vitals:    10/06/19 2345 10/07/19 0000 10/07/19 0045 10/07/19 0115   BP: 121/56 98/55 115/61 133/77   Pulse: (!) 126 (!) 122 (!) 120 (!) 116       Physical Exam   Constitutional: He is oriented to person, place, and time  He appears well-developed and well-nourished  No distress  HENT:   Head: Normocephalic and atraumatic  Nose: Nose normal    Mouth/Throat: Oropharynx is clear and moist  No oropharyngeal exudate  Eyes: Pupils are equal, round, and reactive to light  EOM are normal  Right eye exhibits no discharge   Left eye exhibits no discharge  He injected conjunctivae bilaterally   Neck: Normal range of motion  Cardiovascular: Regular rhythm  No murmur heard  Tachycardia to 150  Pulmonary/Chest: Effort normal and breath sounds normal  No respiratory distress  He has no wheezes  He has no rales  Abdominal: Soft  Bowel sounds are normal  He exhibits no distension  There is no tenderness  No palpable bladder  Musculoskeletal: Normal range of motion  He exhibits no edema or deformity  Neurological: He is alert and oriented to person, place, and time  He exhibits normal muscle tone  Moves all extremities symmetrically  No clonus  Skin: Skin is warm and dry  No rash noted  He is not diaphoretic  No erythema  No pallor  Not diaphoretic or erythematous  Psychiatric: He has a normal mood and affect  Nursing note and vitals reviewed  ED Medications  Medications   sodium chloride 0 9 % bolus 1,000 mL (1,000 mL Intravenous New Bag 10/7/19 0045)   sodium chloride 0 9 % bolus 1,000 mL (0 mL Intravenous Stopped 10/7/19 0045)   LORazepam (ATIVAN) 2 mg/mL injection 2 mg (2 mg Intravenous Given 10/6/19 2310)   lidocaine (URO-JET) 2 % urethral/mucosal gel 1 application (1 application Urethral Given 10/6/19 2312)   sodium chloride 0 9 % bolus 1,000 mL (0 mL Intravenous Stopped 10/7/19 0045)       Diagnostic Studies  Results Reviewed     Procedure Component Value Units Date/Time    Lactic acid x2 Q2H [[id number]] Collected:  10/07/19 0115    Lab Status:   In process Specimen:  Blood from Arm, Left Updated:  10/07/19 0121    CK (with reflex to MB) [[id number]]  (Normal) Collected:  10/06/19 2227    Lab Status:  Final result Specimen:  Blood from Arm, Right Updated:  10/06/19 2349     Total  U/L     Lactic acid x2 Q2H [[id number]]  (Abnormal) Collected:  10/06/19 2227    Lab Status:  Final result Specimen:  Blood from Arm, Right Updated:  10/06/19 2324     LACTIC ACID 2 6 mmol/L     Narrative:       Result may be elevated if tourniquet was used during collection  Ethanol [[id number]]  (Normal) Collected:  10/06/19 2227    Lab Status:  Final result Specimen:  Blood from Arm, Right Updated:  10/06/19 2310     Ethanol Lvl <3 mg/dL     Salicylate level [[id number]]  (Abnormal) Collected:  10/06/19 2227    Lab Status:  Final result Specimen:  Blood from Arm, Right Updated:  21/31/04 5358     Salicylate Lvl <3 mg/dL     Acetaminophen level-If concentration is detectable, please discuss with medical  on call   [[id number]]  (Abnormal) Collected:  10/06/19 2227    Lab Status:  Final result Specimen:  Blood from Arm, Right Updated:  10/06/19 2308     Acetaminophen Level <2 ug/mL     Comprehensive metabolic panel [[id number]]  (Abnormal) Collected:  10/06/19 2227    Lab Status:  Final result Specimen:  Blood from Arm, Right Updated:  10/06/19 2308     Sodium 140 mmol/L      Potassium 4 7 mmol/L      Chloride 107 mmol/L      CO2 25 mmol/L      ANION GAP 8 mmol/L      BUN 16 mg/dL      Creatinine 1 55 mg/dL      Glucose 133 mg/dL      Calcium 9 5 mg/dL      AST 37 U/L      ALT 78 U/L      Alkaline Phosphatase 118 U/L      Total Protein 8 0 g/dL      Albumin 4 3 g/dL      Total Bilirubin 0 62 mg/dL      eGFR 60 ml/min/1 73sq m     Narrative:       Jose guidelines for Chronic Kidney Disease (CKD):     Stage 1 with normal or high GFR (GFR > 90 mL/min/1 73 square meters)    Stage 2 Mild CKD (GFR = 60-89 mL/min/1 73 square meters)    Stage 3A Moderate CKD (GFR = 45-59 mL/min/1 73 square meters)    Stage 3B Moderate CKD (GFR = 30-44 mL/min/1 73 square meters)    Stage 4 Severe CKD (GFR = 15-29 mL/min/1 73 square meters)    Stage 5 End Stage CKD (GFR <15 mL/min/1 73 square meters)  Note: GFR calculation is accurate only with a steady state creatinine    Magnesium [[id number]]  (Normal) Collected:  10/06/19 2227    Lab Status:  Final result Specimen:  Blood from Arm, Right Updated:  10/06/19 2308     Magnesium 2 3 mg/dL     POCT Blood Gas (CG8+) [[id number]]  (Abnormal) Collected:  10/06/19 2259    Lab Status:  Final result Specimen:  Venous Updated:  10/06/19 2304     ph, Ba ISTAT 7 390     pCO2, Ba i-STAT 41 2 mm HG      pO2, Ba i-STAT 73 0 mm HG      BE, i-STAT 0 mmol/L      HCO3, Ba i-STAT 24 9 mmol/L      CO2, i-STAT 26 mmol/L      O2 Sat, i-STAT 94 %      SODIUM, I-STAT 142 mmol/l      Potassium, i-STAT 3 9 mmol/L      Calcium, Ionized i-STAT 1 19 mmol/L      Hct, i-STAT 37 %      Hgb, i-STAT 12 6 g/dl      Glucose, i-STAT 114 mg/dl      Specimen Type VENOUS    POCT Blood Gas (CG8+) [[id number]]     Lab Status:  No result Specimen:  Venous     CBC and differential [[id number]]  (Abnormal) Collected:  10/06/19 2227    Lab Status:  Final result Specimen:  Blood from Arm, Right Updated:  10/06/19 2237     WBC 11 23 Thousand/uL      RBC 4 32 Million/uL      Hemoglobin 12 8 g/dL      Hematocrit 40 4 %      MCV 94 fL      MCH 29 6 pg      MCHC 31 7 g/dL      RDW 14 2 %      MPV 10 6 fL      Platelets 040 Thousands/uL      nRBC 0 /100 WBCs      Neutrophils Relative 85 %      Immat GRANS % 0 %      Lymphocytes Relative 10 %      Monocytes Relative 5 %      Eosinophils Relative 0 %      Basophils Relative 0 %      Neutrophils Absolute 9 45 Thousands/µL      Immature Grans Absolute 0 04 Thousand/uL      Lymphocytes Absolute 1 09 Thousands/µL      Monocytes Absolute 0 57 Thousand/µL      Eosinophils Absolute 0 04 Thousand/µL      Basophils Absolute 0 04 Thousands/µL     POCT Blood Gas (CG8+) [[id number]]  (Abnormal) Collected:  10/06/19 2228    Lab Status:  Final result Specimen:  Venous Updated:  10/06/19 2233     ph, Ba ISTAT 7 498     pCO2, Ba i-STAT 33 6 mm HG      pO2, Ba i-STAT 78 0 mm HG      BE, i-STAT 3 mmol/L      HCO3, Ba i-STAT 26 0 mmol/L      CO2, i-STAT 27 mmol/L      O2 Sat, i-STAT 97 %      SODIUM, I-STAT 135 mmol/l      Potassium, i-STAT >8 0 mmol/L      Calcium, Ionized i-STAT 0 98 mmol/L      Hct, i-STAT 40 %      Hgb, i-STAT 13 6 g/dl      Glucose, i-STAT 142 mg/dl      Specimen Type VENOUS                 No orders to display         Procedures  ECG 12 Lead Documentation Only  Date/Time: 10/7/2019 12:49 AM  Performed by: Siomara Olivas MD  Authorized by: Siomara lOivas MD     Indications / Diagnosis:  Tachycardia, overdose  ECG reviewed by me, the ED Provider: yes    Patient location:  ED  Previous ECG:     Previous ECG:  Compared to current  Interpretation:     Interpretation: normal    Rate:     ECG rate:  141    ECG rate assessment: tachycardic    Rhythm:     Rhythm: sinus rhythm    Ectopy:     Ectopy: none    QRS:     QRS axis:  Normal  Conduction:     Conduction: normal    ST segments:     ST segments:  Normal  T waves:     T waves: normal    Comments:      Sinus tachycardia, terminal R wave noted in aVR, S in II             ED Course  ED Course as of Oct 07 0142   Mon Oct 07, 2019   0028 BP soft to 94 systolic, hanging another L                                  MDM  Number of Diagnoses or Management Options  BRANT (acute kidney injury) ([facility] [address] ): new and requires workup  Diphenhydramine overdose: new and requires workup  Overdose: new and requires workup  Diagnosis management comments: 66-year-old male presenting after Benadryl overdose and possible Xanax overdose, presenting with tachycardia, intermittent hypotension, and febrile to 102 7  Patient's GCS was 15 and continued to be so during emergency department stay  Discussed the case with Toxicology as well as poison Control, recommended monitoring temperature closely and intubating if temperature kept rising for intensive benzodiazepine administration  Followed EKGs closely, no widened QRS's noted, QTc normal   Heart rate started to decrease after administration of fluids, received 3 L normal saline in total   Urinary catheter placed for temperature monitoring  Given 2 mg of IV Ativan, can re-dose if necessary    Temperature decreased to 101 1 at time of admission  Defer intubation at this time due to improving exam and vital signs  Lactate initially 2 6, will recheck at 2 hrs  Will require level 1 step-down level of care for close monitoring and possible deterioration  Amount and/or Complexity of Data Reviewed  Clinical lab tests: ordered and reviewed  Tests in the radiology section of CPT®: ordered and reviewed  Tests in the medicine section of CPT®: ordered and reviewed  Discussion of test results with the performing providers: yes  Decide to obtain previous medical records or to obtain history from someone other than the patient: yes  Obtain history from someone other than the patient: yes  Review and summarize past medical records: yes  Discuss the patient with other providers: yes  Independent visualization of images, tracings, or specimens: yes    Risk of Complications, Morbidity, and/or Mortality  Presenting problems: high  Diagnostic procedures: minimal  Management options: minimal    Patient Progress  Patient progress: stable      Disposition  Final diagnoses:   Overdose   Diphenhydramine overdose   BRANT (acute kidney injury) ([facility] [address] )     Time reflects when diagnosis was documented in both MDM as applicable and the Disposition within this note     Time User Action Codes Description Comment    10/7/2019  1:29 AM Bulmaro Dot Headings Add [T50 901A] Overdose     10/7/2019  1:30 AM Bulmaro Dot Headings Add [T45 0X1A] Diphenhydramine overdose     10/7/2019  1:36 AM Renea Chamberlain Headings Add [N17 9] BRANT (acute kidney injury) [facility])       ED Disposition     ED Disposition Condition Date/Time Comment    Admit Stable Mon Oct 7, 2019  1:29 AM Case was discussed with Dr Landon Johnson and the patient's admission status was agreed to be Admission Status: inpatient status to the service of Dr Landon Johnson   Follow-up Information    None         Patient's Medications   Discharge Prescriptions    No medications on file     No discharge procedures on file      ED Provider  Attending physically available and evaluated Marques Mendenhall I managed the patient along with the ED Attending      Electronically Signed by         Madelyn Don MD  10/07/19 7323

## 2019-10-07 NOTE — ASSESSMENT & PLAN NOTE
Patient claims it was unintentional overdose when he tried  combination of Benadryl + melatonin+ Xanax for insomnia  Management as above  Continue one-to-one  Psychiatry evaluation

## 2019-10-07 NOTE — CONSULTS
Consultation - Medical Toxicology  Hammad Espinosa 34 y o  male MRN: [id number]  Unit/Bed#: [facility] 501-01 Encounter: [id number]      Reason for Consult / Principal Problem:  Overdose  Inpatient consult to Toxicology  Consult performed by: Warren Christiansen DO  Consult ordered by: Marly Gant MD        10/07/19      ASSESSMENT:  29-year-old male with acute polypharmacy overdose, intentional unknown  1  Acute diphenhydramine overdose, intentional unknown  2  Acute alprazolam overdose, intentional unknown  3  Encephalopathy secondary to drug overdose  4  Tachycardia  5  Upper respiratory infection  6  Substance abuse: opioids, barbiturates, benzodiazepines, OTC medications      RECOMMENDATIONS:  Please continue supportive care until the patient is asymptomatic  Upon my evaluation, the patient had anticholinergic symptoms without anticholinergic toxidrome, and symptoms of upper respiratory infection  It is likely that the patient overdosed on diphenhydramine and alprazolam as the primary factor in his presentation, given the extent of his tachycardia, hyperthermia, and encephalopathy  It appears he had full anticholinergic toxidrome on presentation  This would not be consistent with overdosing on 4 diphenhydramine as he currently states  He did state that he overdosed on 20 tablets in the emergency department, which would be more consistent  Additional overdose agents include alprazolam and melatonin  Alprazolam is not routinely detected in urine drug screen when taken therapeutically  Alprazolam overdose likely served as accidentally assisting in treatment for his diphenhydramine overdose  Melatonin overdose is not toxic  His urine drug screen was also positive for barbiturates and opiates  It is unclear where he may have obtained barbiturates  However, opiate positive urine drug screens are specifically the results morphine, codeine, and metabolites of heroin    Other agents may trigger a false positive in excessive overdose  He denied any use of opioids to me  In this setting, suspicion should exist for substance abuse/dependency  Please provide rehabilitation resources and options  There is also evidence of upper respiratory infection with wheezing and cough  The said of his tachycardia and hyperthermia are not consistent with upper respiratory infection or pneumonia, a but mixed picture is present  This may be associated with aspiration prior to presentation verses underlying, pre-existing URI  I would suggest obtaining a chest XR, any other further diagnostic tests deemed necessary, along with appropriate treatment  Please hold the patient's sertraline, aripiprazole, and trazodone until her mental status fully improved  Please discontinue the q 6 hour p r n  Carvedilol  This is not necessary to treat tachycardia associated with anticholinergic toxicity or infection  His regular carvedilol may be given b i d  As generally taken  Patient requires psychiatric evaluation  For further questions, please call [facility]'s  Service or Patient Access Center to reach the medical  on call  Please see additional teaching note below (if available)    Medical Toxicology  [address] [facility]  Anticholinergic Syndrome  Updated 03/04/2008    a- Background: Anticholinergic intoxication can occur from exposure to a wide variety of prescription and over-the-counter medications and numerous plants and mushrooms  Common drugs that have anticholinergic activities include: antihistamines, antipsychotics, antispasmodics, skeletal muscle relaxants, and tricyclic antidepressants (TCAs)  Plants and mushrooms containing anticholinergic alkaloids include: jimsonweed (Datura stramonium), deadly nightshade (Atropa belladonna), and fly agaric (Roxine Hearing)    b- Mechanism of Toxicity:   Competitively antagonize the effect of acetylcholine at peripheral muscarinic receptors  They mostly affect exocrine glands (such as sweating and salivation) and smooth muscle  Inhibition of muscarinic activities in the heart leads to a rapid heartbeat   Pharmacokinetics: Absorption may be delayed due to its effect on GI motility  Duration of toxic effect can be quite prolonged (e g  benztropine  2-3 days)  c- Toxic dose: The range of toxicity is highly variable and unpredictable   Examples of Fatal doses from case reports:  i  Young Child: 1-2 mg Atropine, instilled in the eye  ii  Adult: 32 mg Atropine, IM injection   Minimal effect: increased heart rate and dry mouth after 360 mg of trospium chloride  d- Clinical presentation: Anticholinergic syndrome is characterized by warm, dry, flushed skin, dry mucous membranes, mydriasis, delirium, tachycardia, ileus, and urinary retention  Jerky myoclonic movements and choreoathetosis are common and can lead to rhabdomyolysis  Hyperthermia, coma, respiratory arrest and seizures may occur  e- Diagnosis: Based on history of exposure and typical features of anticholinergic syndrome  Trial dose of physostigmine can be used to confirm the diagnosis, especially with rapid reversal of the syndrome  f- Laboratory studies: Not generally available for the anticholinergics, but other labs can be useful such as electrolytes, glucose, CPK, and ECG  g- Treatment:    ABC   Seizure and muscular hyperactivity: Should be treated with benzodiazepines  Treat aggressively to prevent hyperthermia and rhabdomyolysis and their resultant complications  If unsuccessful use phenobarbital or propofol   Delirium: Treat with benzodiazepines, if resistant to benzodiazepines consider treating with physostigmine   GI decontamination maybe helpful in delayed presentation secondary to decreased GI motility provided that the patient is awake and alert     Enhanced elimination: Procedures such as hemodialysis and repeated-dose activated charcoal are not effective in removing anticholinergic agents   Physostigmine:   i  Pharmacology: Physostigmine is a carbamate and a reversible inhibitor of acetylcholinesterase  It increases acetylcholine concentration, causing stimulation of both muscarinic and nicotinic receptors  It also can penetrate the blood-brain barrier  It has nonspecific arousal effects  1  Onset of action: 3-8 minutes after parenteral administration  2  Duration of action: 30-90 minutes  3  Elimination half-life: 15-40 minutes  ii  Indications:  1  Severe anticholinergic syndrome from antimuscarinic agents  Generally used to reverse delirium resistant to benzodiazepines  2  Diagnostically: To differentiate functional psychosis from anticholinergic delirium   iii  Contraindications:  1  Should not be used as an antidote for cyclic antidepressant overdose because it may worsen cardiac conduction disturbance, cause bradyarrythmias or asystole, and aggravate or precipitate seizures  2  Do not use physostigmine with concurrent use of depolarizing neuromuscular blockers (e g  succinylcholine)  3  Known hypersensitivity to agent or preservative  4  Relative contraindication may include: Asthma, peripheral vascular disease, intestinal and bladder blockade, parkinsonian syndrome, and AV Block  iv  Adverse effects:  1  Bradycardia, heart block, and asystole  2  Seizure (particularly with rapid administration or excessive dose)  3  Nausea, vomiting, hypersalivation, and diarrhea  4  Bronchorrhea and bronchospasm  5  Fasciculation and muscle weakness  v  Dosage:  1  Adult: 0 5-2 mg slow IV push (£ 1 mg/min)  2  Children: 0 02 mg/kg slow IV push (£ 0 5 mg/min)  3  Repeat as needed every 10-30 minutes  4  Precautions: Patient should be on a cardiac monitor  Atropine should be kept at bedside to treat excessive muscarinic stimulation  5  Should not be administered IM or as a continuous infusion  6  It is better to undertreat than to overtreat   Physostigmine toxicity results when used in excess or in the absence of antimuscarinic toxicity  References:  Mony Thaddeus  Poisoning & Drug Overdose  2007  Maximilian Cortes Toxicologic Emergencies  2006  Medical Toxicology  [facility]  Benzodiazepines & Benzodiazpine-like Drugs      Background: Benzodiazepines are the most commonly prescribed sedatives  They are used frequently for the treatment of anxiety, stress reactions, insomnia, muscle spasms, alcohol withdrawal, seizure control and other psychiatric disorders  Benzodiazepines include a wide array of compounds which vary in potency, duration of effect, presence or absence of active metabolites and clinical use  Other non-benzodiazepines include zolpidem and zaleplon, which are benzodiazepine-like  Death from isolated benzodiazepine overdose is rare  Mechanism of Toxicity: Benzodiazepines enhance the action of the inhibitory neurotransmitter gamma-aminobutyric acid (PARKER) and inhibit other neuronal systems by poorly defined mechanisms  This results in generalized depression of spinal reflexes and the reticular activating system causing CNS and respiratory depression  Respiratory arrest is more common with shorter-acting benzodiazepines such as triazolam (Halcion), alprazolam (Xanax), and midazolam (Versed)  Cardiopulmonary arrest has been documented post rapid injection of diazepam, this was thought to be secondary to either its CNS depressant effects or because of the toxic effects of its diluent propylene glycol  Propylene glycol has also been implicated in multiple case reports of metabolic acidosis, hyperosmolar states and cardiovascular compromise in patients who have received prolonged sedation with lorazepam      Pharmacokinetics: Most of these agents are highly protein bound (%)    Variables such as time to peak blood level, elimination half-life, and the presence or absence of active metabolites vary widely among different compounds  Some benzodiazepines, like diazepam, undergo demethylation in the liver and yield active metabolites which have a prolonged half-life compared to the parent compound  Often there is no correlation between therapeutic and biological half-lives  A few commonly used agents are listed below  Remember, with supra-therapeutic ingestions these half-lives are unpredictable  Drug Half-life (hours) Active Metabolite   Alprazolam 6 3-26 9 No   Clonazepam 18-50 No   Diazepam  Yes   Lorazepam 10-20 No   Midazolam 2 2-6 8 Yes     Toxic Dose: Benzodiazepines in general have a very wide therapeutic window; however, co-ingestion of other drugs with CNS with depressant effects like ethanol, barbiturates, antipsychotics or opioids, results in a synergistic effect  Clinical Presentation: Onset of CNS depression may be observed within  minutes of ingestion, depending upon the compound  Lethargy, slurred speech, incoordination, ataxia, stupor, coma and respiratory arrest may occur  Often patients with benzodiazepine-induced coma have hyporeflexia and mid-range or small pupils  Hypothermia can also occur in overdose  Complications after ingestion are more likely when short-acting or other depressant agents are involved  Children often present with lethargy and CNS depression; however, in a case series in children with a mean age of 43 months, 17% presented with ataxia alone  Diagnostic Testing: Consider benzdiazepine ingestion based upon history & presentation  The differential should include other sedative-hypnotics, antidepressants, antipsychotics, and narcotics  Coma and small pupils will not respond to naloxone administration, but may reverse with flumazenil  Serum levels are often available from commercial laboratories, but are rarely of value  Urine and blood qualitative screening may provide rapid confirmation of exposure    However, immunoassays do not detect all benzodiazepines so, a negative screen does not exclude significant exposure  Other useful tests include glucose, ABG/VBG or pulse oximetry  Decontamination:  Administer activated charcoal with caution  If the patient is sleepy, avoid administration and support symptomatically  Treatment:  Symptomatic and supportive treatment is the mainstay of therapy  Intubate immediately if the patient is not protecting his or her airway  Hypotensive patients should be fluid resuscitated with volume expansion  Profound hypotension is rare and should lead the provider to consider co-ingestions  Use vasopressors when patients do not respond to IVF or in patients with a history of or clinical presentation consistent with CHF, cardiomyopathy or pulmonary edema  Flumazenil is a specific benzodiazepine receptor antagonist that can rapidly reverse benzodiazepine effect; however, because overdose with this class of drug is rarely fatal, the role of flumazenil in routine management has yet to be established  It is administered with a starting dose of 0 1-0 2mg IV and repeated as needed up to a maximum of 2mg  Continuous IV infusion from 0 1-1mg/h in 0 9%NaCl or D5W may also be utilized  The most important drawback to its administration is that flumazenil may induce seizures and dysrhythmias in patients with co-intoxication with cocaine or tricyclic antidepressants  It does not reliably reverse respiratory depression, but does reverse CNS depression  Flumazenil also effectively reverses the depressive effects caused by the non-benzodiazepines zolpidem and zaleplon  Administration may induce acute withdrawal, including seizures and autonomic instability in those patients who are addicted to benzodiazepines  Repeated doses or a continuous infusion are often required as the duration of action for most benzodiazepines is longer than flumazenil  There is no role for diuresis, dialysis, or hemoperfusion  References:    Alla Rajan  Poisoning & Drug Overdose, 5th Ed (2007)  Maximilian Cortes Toxicologic Emergencies, 8th Ed (2006)    History limited by mild encephalopathy and CNS depression, as well as psychiatric disorder    HPI: Javier Art is a 34y o  year old male who presents with report of overdose on diphenhydramine and alprazolam   The patient was encephalopathic, tachycardic hyperthermic upon arrival   He improved supportive care  His temperature normalized  His heart rate persisted at around 59579 overnight  His mental status also improved  Upon my evaluation in the morning, he stated that he took for diphenhydramine to assist in sleeping, along with extra alprazolam and melatonin  He denied suicidal ideations  This is conflicting with the story that he presented with, and which she described taking 20 diphenhydramine and was much more symptomatic than he has this morning  He also states that he has had a cough in recent weeks  He was admitted to the regular medical floor after his hyperthermia improved  Review of Systems   Unable to perform ROS: Psychiatric disorder   Constitutional: Negative  HENT: Negative  Eyes: Negative  Respiratory: Positive for cough  Cardiovascular: Negative  Gastrointestinal: Negative  Genitourinary: Negative  Musculoskeletal: Negative  Neurological: Negative  Psychiatric/Behavioral: Negative for suicidal ideas  The patient is nervous/anxious          Historical Information   Past Medical History:   Diagnosis Date    Anxiety     Asthma     [facility] [address] [facility]     Depression     Diabetes mellitus ([facility] [address] [facility]     Disease of thyroid gland     Hypertension     IBS (irritable bowel syndrome)     Psychiatric disorder     Bipolar Disorder    Psychiatric illness      Past Surgical History:   Procedure Laterality Date    KNEE SURGERY Right     TONSILLECTOMY       Social History   Social History Substance and Sexual Activity   Alcohol Use Never    Frequency: Never    Comment: denies      Social History     Substance and Sexual Activity   Drug Use Never    Types: MDMA (ecstacy), Marijuana, Methamphetamines, Cocaine    Comment: reports recent use (overdose) only      Social History     Tobacco Use   Smoking Status Smoker, Current Status Unknown    Packs/day: 1 00    Types: Cigarettes   Smokeless Tobacco Never Used     Family History   Problem Relation Age of Onset    Anxiety disorder Mother     Depression Mother     Bipolar disorder Mother     Anxiety disorder Maternal Aunt     Anxiety disorder Maternal Grandmother         Prior to Admission medications    Medication Sig Start Date End Date Taking? Authorizing Provider   albuterol (PROVENTIL HFA,VENTOLIN HFA) 90 mcg/act inhaler Inhale 2 puffs every 4 (four) hours as needed for wheezing 10/3/19  Yes Lukasz Murrieta MD   ARIPiprazole (ABILIFY) 5 mg tablet Take 1 tablet (5 mg total) by mouth daily 10/4/19  Yes Lukasz Murrieta MD   carvedilol (COREG) 6 25 mg tablet Take 1 tablet (6 25 mg total) by mouth 2 (two) times a day with meals 10/3/19  Yes Lukasz Murrieta MD   fluticasone-salmeterol (ADVAIR HFA) 45-21 MCG/ACT inhaler Inhale 2 puffs 2 (two) times a day Rinse mouth after use   12/18/18  Yes Chinedu Velazquez PA-C   hydrochlorothiazide (HYDRODIURIL) 12 5 mg tablet Take 1 tablet (12 5 mg total) by mouth daily 10/4/19  Yes Lukasz Murrieta MD   levothyroxine 25 mcg tablet Take 1 tablet (25 mcg total) by mouth daily in the early morning 10/3/19  Yes Lukasz Murrieta MD   lisinopril (ZESTRIL) 20 mg tablet Take 1 tablet (20 mg total) by mouth daily 10/4/19  Yes Lukasz Murrieta MD   nicotine (NICODERM CQ) 21 mg/24 hr TD 24 hr patch Place 1 patch on the skin daily 10/4/19  Yes Lkuasz Murrieta MD   pantoprazole (PROTONIX) 20 mg tablet Take 1 tablet (20 mg total) by mouth daily in the early morning 10/4/19  Yes Lukasz Murrieta MD sertraline (ZOLOFT) 50 mg tablet Take 1 tablet (50 mg total) by mouth daily 10/4/19  Yes May Maria MD   traZODone (DESYREL) 50 mg tablet Take 1 tablet (50 mg total) by mouth daily at bedtime as needed for sleep 10/3/19  Yes May Maria MD       Current Facility-Administered Medications   Medication Dose Route Frequency    acetaminophen (TYLENOL) tablet 650 mg  650 mg Oral Q6H PRN    albuterol (PROVENTIL HFA,VENTOLIN HFA) inhaler 2 puff  2 puff Inhalation Q4H PRN    carvedilol (COREG) tablet 6 25 mg  6 25 mg Oral BID With Meals    docusate sodium (COLACE) capsule 100 mg  100 mg Oral BID    fluticasone-vilanterol (BREO ELLIPTA) 100-25 mcg/inh inhaler 1 puff  1 puff Inhalation Daily    hydrALAZINE (APRESOLINE) injection 5 mg  5 mg Intravenous Q6H PRN    influenza vaccine, quadrivalent (FLUARIX) IM injection 0 5 mL  0 5 mL Intramuscular Once    insulin lispro (HumaLOG) 100 units/mL subcutaneous injection 2-12 Units  2-12 Units Subcutaneous TID AC    insulin lispro (HumaLOG) 100 units/mL subcutaneous injection 2-12 Units  2-12 Units Subcutaneous HS    lactated ringers infusion  125 mL/hr Intravenous Continuous    levothyroxine tablet 25 mcg  25 mcg Oral Early Morning    LORazepam (ATIVAN) 2 mg/mL injection 1 mg  1 mg Intravenous Q2H PRN    nicotine (NICODERM CQ) 21 mg/24 hr TD 24 hr patch 1 patch  1 patch Transdermal Daily    ondansetron (ZOFRAN) injection 4 mg  4 mg Intravenous Q6H PRN    pantoprazole (PROTONIX) EC tablet 20 mg  20 mg Oral Early Morning    polyethylene glycol (MIRALAX) packet 17 g  17 g Oral Daily PRN    senna (SENOKOT) tablet 8 6 mg  1 tablet Oral Daily       Allergies   Allergen Reactions    Pollen Extract        Objective       Intake/Output Summary (Last 24 hours) at 10/7/2019 0824  Last data filed at 10/7/2019 0805  Gross per 24 hour   Intake 3240 8 ml   Output 700 ml   Net 2540 8 ml       Invasive Devices:   Peripheral IV 10/06/19 Right Hand (Active)   Site Assessment Intact;Dry;Clean 10/7/2019  4:00 AM   Dressing Type Transparent 10/7/2019  4:00 AM   Line Status Flushed;Saline locked; Blood return noted 10/7/2019  4:00 AM   Dressing Status Intact;Dry;Clean 10/7/2019  4:00 AM       Peripheral IV 10/06/19 Left Antecubital (Active)   Site Assessment Intact;Dry;Clean 10/7/2019  4:00 AM   Dressing Type Transparent 10/7/2019  4:00 AM   Line Status Flushed; Infusing;Blood return noted 10/7/2019  4:00 AM   Dressing Status Intact;Dry;Clean 10/7/2019  4:00 AM       Urethral Catheter Temperature probe 18 Fr  (Active)   Reasons to continue Urinary Catheter  Accurate I&O assessment in critically ill patients (48 hr  max) 10/7/2019  4:00 AM   Goal for Removal Voiding trial when ambulation improves 10/7/2019  4:00 AM   Site Assessment Skin intact; Clean 10/7/2019  4:00 AM   Collection Container Standard drainage bag 10/7/2019  4:00 AM   Securement Method Securing device (Describe) 10/7/2019  4:00 AM   Output (mL) 700 mL 10/7/2019  4:00 AM       Vitals   Vitals:    10/07/19 0145 10/07/19 0215 10/07/19 0314 10/07/19 0704   BP: 132/67 131/64 135/68 124/65   TempSrc:   Oral Oral   Pulse: (!) 110 (!) 108 98 98   Resp: 21 (!) 25 (!) 24 22   Patient Position - Orthostatic VS:   Lying Lying   Temp: 100 °F (37 8 °C) 100 °F (37 8 °C) 98 6 °F (37 °C) 98 3 °F (36 8 °C)       Physical Exam   Constitutional: He appears well-developed and well-nourished  HENT:   Head: Normocephalic  Eyes: Pupils are equal, round, and reactive to light  Cardiovascular: Regular rhythm  Tachycardia present  Pulmonary/Chest: Effort normal  He has wheezes  Abdominal: Soft  He exhibits no distension  There is no tenderness  Musculoskeletal: Normal range of motion  Neurological: He is alert  Skin: Skin is warm and dry  Nursing note and vitals reviewed  EKG, Pathology, and Other Studies: I have personally reviewed the EKG reports and the intervals are unremarkable    I have reviewed the recent 3 EKGs     Lab Results: I have personally reviewed pertinent reports  Labs:  Results from last 7 days   Lab Units 10/07/19  0501  10/06/19  2227   WBC Thousand/uL 11 05*  --  11 23*   HEMOGLOBIN g/dL 10 6*  --  12 8   I STAT HEMOGLOBIN   --    < >  --    HEMATOCRIT % 34 2*  --  40 4   HEMATOCRIT, ISTAT   --    < >  --    PLATELETS Thousands/uL 263  --  348   NEUTROS PCT %  --   --  85*   LYMPHS PCT %  --   --  10*   MONOS PCT %  --   --  5    < > = values in this interval not displayed  Results from last 7 days   Lab Units 10/07/19  0500 10/06/19  2259  10/06/19  2227   SODIUM mmol/L 143  --   --  140   POTASSIUM mmol/L 3 7  --   --  4 7   CHLORIDE mmol/L 111*  --   --  107   CO2 mmol/L 26  --   --  25   CO2, I-STAT mmol/L  --  26   < >  --    BUN mg/dL 13  --   --  16   CREATININE mg/dL 1 12  --   --  1 55*   CALCIUM mg/dL 8 3  --   --  9 5   ALK PHOS U/L  --   --   --  118*   ALT U/L  --   --   --  78   AST U/L  --   --   --  37   GLUCOSE, ISTAT mg/dl  --  114   < >  --    MAGNESIUM mg/dL 2 3  --   --  2 3   PHOSPHORUS mg/dL 2 4*  --   --   --     < > = values in this interval not displayed            Results from last 7 days   Lab Units 10/07/19  0115 10/06/19  2227   LACTIC ACID mmol/L 1 0 2 6*     0   Lab Value Date/Time    TROPONINI <0 02 02/27/2019 0522    TROPONINI <0 02 12/13/2018 1341    TROPONINI <0 02 12/13/2018 1131         Results from last 7 days   Lab Units 10/06/19  2227   ACETAMINOPHEN LVL ug/mL <2*   ETHANOL LVL mg/dL <3   SALICYLATE LVL mg/dL <3*         Minutes of critical care time 46  -Critical care time was exclusive of seperately billable procedures and teaching time    -Critical care was necessary to treat or prevent imminent or life-threatening deterioration of the following condition: cardiac failure, CNS failure/comprimise, dehydration, respiratory failure, sepsis, toxidrome   -Critical care time was spent personally by me on the following activities as well as the above as per the course and rest of chart: obtaining history from patient/surrogate, developement of a treatment plan, discussions with primary provider(s), evaluation of patient's response to the treatment, examination of the patient, recommending treatements and interventions, re-evaluation of the patient's conditio, review of old charts, recommending/reviewing laboratory studies, recommending/reviewing of radiographic studies

## 2019-10-07 NOTE — QUICK NOTE
Post admit check    Admitted overnight for Xanax and Benadryl overdose  Denies suicidal ideation  This morning, sinus tachycardia significantly resolved  Temperatures have come down  Patient is somnolent but wakes up on calling his name and able to communicate  Denies that it was not a suicidal attempt  He reports that he has insomnia and he was trying to get some sleep  UDS positive for benzodiazepine, barbiturates and opiates  Received a dose of Ativan IV at 11:00 a m  Last night  He denies any chest pain or shortness of breath  Reports history of asthma, and uses albuterol as needed  Had some viral URTI like symptoms prior to this  Constitutional: Not in any acute distress      Cardiovascular: Normal rate, regular rhythm, normal heart sounds  No murmur heard  Pulmonary/Chest: Effort normal, breath sounds bilaterally in normal, wheezes present, no rales  Abdominal: Soft  Non-distended, Non-tender  Bowel sounds are normal    Neurological:  Wakes up on calling his name, communicative, moves all extremities  ## Benadryl and Xanax overdose- tachycardia and hypothermia is now improving  Appreciate toxicology input   P r n  Ativan ordered  Holding all other sedatives for now  Await Psychiatry input  Maintain on one-to-one precautions  ## acute kidney injury and lactic acidosis- likely secondary to above,  resolved with IV hydration  ## URTI- rapid influenza test negative  Obtain chest x-ray  Albuterol p r n  Given his history of asthma and wheezing  Consider five-day course of steroids if wheezing does not get better

## 2019-10-07 NOTE — ASSESSMENT & PLAN NOTE
Unclear etiology, possible volume depletion prerenal azotemia   on September 30 creatinine was 0 87 today went up to 1 55  Will avoid nephrotoxin drugs  Continue vigorous IV hydration  Repeat BMP in a m

## 2019-10-07 NOTE — ASSESSMENT & PLAN NOTE
Deemed to be secondary to antihistamine  Overdose  Supportive care  Symptomatic treatment  Continue Tylenol  And Ativan as needed

## 2019-10-07 NOTE — ASSESSMENT & PLAN NOTE
Most likely secondary to # 1, dehydration   Significantly improved with IV fluids  Continue overnight hydration

## 2019-10-07 NOTE — ASSESSMENT & PLAN NOTE
-Benadryl overdose / melatonin and Xanax  -Toxicology was called  - patient was discussed with Toxicology and poison Control, at arrival he had a high temperature of 102° and a heart rate of 150 which decreased for now recommendation when the temperature was high was to give high-dose benzodiazepines and supportive management, patient did not need these measures  - received 3 L of IV fluids which improved his tachycardia to the 110  - 1-1 observation  - patient denies suicidal or homicidal ideations  - step-down level 1 month type monitoring

## 2019-10-07 NOTE — ASSESSMENT & PLAN NOTE
Patient presented with AMS  after he  took 200 mg of Benadryl+ unknown amount of Xanax  He reported ER he took the pills to help him with insomnia did not aim to overdose  However he was placed on one-to-one observation in the ER until psychiatry clearance  In the emergency room patient spiked fever 102 7F deemed to be secondary to Benadryl side effect  ER contacted poison Control and   on-call who recommended admit to telemetry with official toxicology consult in a   For temperature above 103F can give Ativan IV    Will continue Tylenol and Ativan as needed  Patient received 3 L of normal saline his lactic acid dropped from 2 6 ==>1  Continue IV hydration  Neurochecks  Psychiatry consult  Toxicology consult  CC consult appreciate  Patient recommended for step-down 1
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
## 2019-10-07 NOTE — ASSESSMENT & PLAN NOTE
Lab Results   Component Value Date    HGBA1C 6 2 10/02/2019       No results for input(s): POCGLU in the last 72 hours      Blood Sugar Average: Last 72 hrs:  Continue Accu-Cheks insulin sliding scale

## 2019-10-07 NOTE — ED NOTES
pts girlfriend has called 4 times for updates, this nurse has reminded the pts girlfriend that I am not able to give information about the pt or his care at this time but as I have been in the room with them on the phone I explained that she is able to ask him questions while they are on the phone   She stated that she would do that      [facility]iodun [facility]Well[facility]  10/07/19 6205

## 2019-10-07 NOTE — PROGRESS NOTES
Progress Note - James Benoit 1990, 34 y o  male MRN: [id number]    Unit/Bed#: ED 17 Encounter: [id number]    Primary Care Provider: Ivana Younger DO   Date and time admitted to hospital: 10/6/2019 10:09 PM        * Overdose  Assessment & Plan  -Benadryl overdose / melatonin and Xanax  -Toxicology was called  - patient was discussed with Toxicology and poison Control, at arrival he had a high temperature of 102° and a heart rate of 150 which decreased for now recommendation when the temperature was high was to give high-dose benzodiazepines and supportive management, patient did not need these measures  - received 3 L of IV fluids which improved his tachycardia to the 110  - 1-1 observation  - patient denies suicidal or homicidal ideations  - step-down level 1 month type monitoring      BRANT (acute [facility]) ([facility] )  Assessment & Plan  Continue to monitor creatinine  Monitor urine output  Isolate at 100 ml/hr     Sinus tachycardia  Assessment & Plan  Improving after administration of 3 L of fluid  Temperature control    Lactic acidosis  Assessment & Plan  Patient is a lactic acidosis  Check lactic until cleared    Bipolar affective disorder, currently depressed, moderate ([facility] )  Assessment & Plan  See above    Depression  Assessment & Plan  Continue trazodone Zoloft and Abilify     Type 2 diabetes mellitus without complication, without long-term current use of insulin ([facility] )  Assessment & Plan  Lab Results   Component Value Date    HGBA1C 6 2 10/02/2019       No results for input(s): POCGLU in the last 72 hours      Blood Sugar Average: Last 72 hrs:  - continue to monitor glucose closely  - patient's last hemoglobin A1c was 6 2 it is elevated  - blood glucose today is 101    Essential hypertension  Assessment & Plan  Continue Coreg, lisinopril and hydrochlorothiazide when blood pressure improves  Would restart 1 at the time depending on the blood pressure tomorrow            --------------------------------------------------------------------------------------------------------------  Chief Complaint:  Overdose on Benadryl    History of Present Illness   HX and PE limited by: Brielle Alexander is a 34 y o  male with an extensive psychiatry history including anxiety depression and bipolar disorder with a history of inpatient stays, also medical history of hypertension type 2 diabetes CAD hyperlipidemia brought to the ER after taking 200 mg of Benadryl melatonin and Xanax in smaller doses, patient was awake and arousable and he stated he has normal cycle was suicidal ideations unit took the Benadryl because he wanted to sleep  He stated he had trouble sleeping last couple days patient denies any drug use  During the ER stay received 3 L of IV fluids due to low blood pressures and a temperature of 102 6° at this point Toxicology and poison Control was called and the recommendation is encased attempted keeps rising to start intensive benzodiazepine administration in intubated patient, patient responded well to the fluids temperature decreased he also received 2 mg of IV Ativan in the ER at that point temperature decreased to 101 1 ,   Patient has made a complains pain everywhere, he denies chest pain at time abdominal pain shortness of breath or coughing  History obtained from chart review and the patient  Review of Systems   Constitutional: Positive for appetite change, chills, fatigue and fever  HENT: Negative for drooling, ear pain, facial swelling, rhinorrhea, trouble swallowing and voice change  Eyes: Negative  Negative for pain and redness  Respiratory: Negative  Negative for cough, chest tightness, shortness of breath, wheezing and stridor  Cardiovascular: Negative  Negative for chest pain, palpitations and leg swelling  Gastrointestinal: Negative  Negative for abdominal pain, blood in stool, nausea and vomiting     Endocrine: Negative  Negative for polydipsia, polyphagia and polyuria  Genitourinary: Negative for difficulty urinating, dysuria, flank pain, frequency, hematuria and urgency  Musculoskeletal: Negative  Negative for arthralgias, joint swelling, myalgias, neck pain and neck stiffness  Skin: Negative  Negative for pallor, rash and wound  Allergic/Immunologic: Negative  Negative for environmental allergies, food allergies and immunocompromised state  Neurological: Positive for light-headedness and numbness  Negative for dizziness, tremors, seizures, syncope and headaches  Hematological: Negative  Does not bruise/bleed easily  Psychiatric/Behavioral: Positive for behavioral problems and self-injury  Negative for agitation, confusion and hallucinations  The patient is nervous/anxious  The patient is not hyperactive          Code Status: Level 1 - Full Code  --------------------------------------------------------------------------------------------------------------    Historical Information   Past Medical History:   Diagnosis Date    Anxiety     Asthma     COPD (chronic obstructive pulmonary disease) ([place] [address] )     Depression     Diabetes mellitus ([place] [address] )     Disease of thyroid gland     Hypertension     IBS (irritable bowel syndrome)     Psychiatric disorder     Bipolar Disorder    Psychiatric illness      Past Surgical History:   Procedure Laterality Date    KNEE SURGERY Right     TONSILLECTOMY       Social History   Social History     Substance and Sexual Activity   Alcohol Use No    Comment: denies      Social History     Substance and Sexual Activity   Drug Use Yes    Types: MDMA (ecstacy), Marijuana, Methamphetamines, Cocaine    Comment: reports recent use (overdose) only      Social History     Tobacco Use   Smoking Status Smoker, Current Status Unknown    Packs/day: 1 00    Types: Cigarettes   Smokeless Tobacco Never Used     Exercise History: n /a  Family History:   Family History   Problem Relation Age of Onset    Anxiety disorder Mother     Depression Mother     Bipolar disorder Mother     Anxiety disorder Maternal Aunt     Anxiety disorder Maternal Grandmother        Vitals:   Vitals:    10/06/19 2345 10/07/19 0000 10/07/19 0045 10/07/19 0115   BP: 121/56 98/55 115/61 133/77   Pulse: (!) 126 (!) 122 (!) 120 (!) 116   Resp: (!) 28 (!) 26 (!) 24 (!) 29   Temp: (!) 101 5 °F (38 6 °C) (!) 101 1 °F (38 4 °C) (!) 100 8 °F (38 2 °C) 100 4 °F (38 °C)   TempSrc:       SpO2: 99% 99% 98% 97%     Temp  Min: 100 4 °F (38 °C)  Max: 102 7 °F (39 3 °C)        There is no height or weight on file to calculate BMI  N/A    Physical Exam   Constitutional: He is oriented to person, place, and time  Obese body habitus, younger male appearing older than stated age   HENT:   Head: Normocephalic and atraumatic  Eyes: Pupils are equal, round, and reactive to light  EOM are normal    Neck: Normal range of motion  Neck supple  No JVD present  No tracheal deviation present  Cardiovascular: Normal rate, regular rhythm and intact distal pulses  Exam reveals no friction rub  No murmur heard  Pulmonary/Chest: Effort normal and breath sounds normal  No stridor  No respiratory distress  Abdominal: Soft  Bowel sounds are normal  He exhibits distension  There is no tenderness  There is no guarding  Genitourinary: Penis normal    Musculoskeletal: Normal range of motion  He exhibits edema  Neurological: He is alert and oriented to person, place, and time  Skin: Skin is warm and dry  Capillary refill takes less than 2 seconds  Psychiatric: He has a normal mood and affect  His behavior is normal    Nursing note and vitals reviewed        Medications:  Current Facility-Administered Medications   Medication Dose Route Frequency    acetaminophen (TYLENOL) tablet 650 mg  650 mg Oral Q6H PRN    albuterol (PROVENTIL HFA,VENTOLIN HFA) inhaler 2 puff  2 puff Inhalation Q4H PRN    carvedilol (COREG) tablet 6 25 mg  6 25 mg Oral BID With Meals    docusate sodium (COLACE) capsule 100 mg  100 mg Oral BID    fluticasone-salmeterol (ADVAIR, WIXELA) 100-50 mcg/dose inhaler 1 puff  1 puff Inhalation Q12H [facility]    hydrALAZINE (APRESOLINE) injection 5 mg  5 mg Intravenous Q6H PRN    lactated ringers infusion  125 mL/hr Intravenous Continuous    levothyroxine tablet 25 mcg  25 mcg Oral Early Morning    LORazepam (ATIVAN) 2 mg/mL injection 1 mg  1 mg Intravenous Q2H PRN    nicotine (NICODERM CQ) 21 mg/24 hr TD 24 hr patch 1 patch  1 patch Transdermal Daily    ondansetron (ZOFRAN) injection 4 mg  4 mg Intravenous Q6H PRN    pantoprazole (PROTONIX) EC tablet 20 mg  20 mg Oral Early Morning    polyethylene glycol (MIRALAX) packet 17 g  17 g Oral Daily PRN    senna (SENOKOT) tablet 8 6 mg  1 tablet Oral Daily     Home medications:  Prior to Admission Medications   Prescriptions Last Dose Informant Patient Reported? Taking?    ARIPiprazole (ABILIFY) 5 mg tablet   No No   Sig: Take 1 tablet (5 mg total) by mouth daily   albuterol (PROVENTIL HFA,VENTOLIN HFA) 90 mcg/act inhaler   No No   Sig: Inhale 2 puffs every 4 (four) hours as needed for wheezing   carvedilol (COREG) 6 25 mg tablet   No No   Sig: Take 1 tablet (6 25 mg total) by mouth 2 (two) times a day with meals   fluticasone-salmeterol (ADVAIR HFA) 45-21 MCG/ACT inhaler   No No   Sig: Inhale 2 puffs 2 (two) times a day Rinse mouth after use    hydrochlorothiazide (HYDRODIURIL) 12 5 mg tablet   No No   Sig: Take 1 tablet (12 5 mg total) by mouth daily   levothyroxine 25 mcg tablet   No No   Sig: Take 1 tablet (25 mcg total) by mouth daily in the early morning   lisinopril (ZESTRIL) 20 mg tablet   No No   Sig: Take 1 tablet (20 mg total) by mouth daily   nicotine (NICODERM CQ) 21 mg/24 hr TD 24 hr patch   No No   Sig: Place 1 patch on the skin daily   pantoprazole (PROTONIX) 20 mg tablet   No No   Sig: Take 1 tablet (20 mg total) by mouth daily in the early morning   sertraline (ZOLOFT) 50 mg tablet   No No   Sig: Take 1 tablet (50 mg total) by mouth daily   traZODone (DESYREL) 50 mg tablet   No No   Sig: Take 1 tablet (50 mg total) by mouth daily at bedtime as needed for sleep      Facility-Administered Medications: None     Allergies: Allergies   Allergen Reactions    Pollen Extract          Laboratory and Diagnostics:  Results from last 7 days   Lab Units 10/06/19  2259 10/06/19  2228 10/06/19  2227 09/30/19  1843   WBC Thousand/uL  --   --  11 23* 8 95   HEMOGLOBIN g/dL  --   --  12 8 12 5   I STAT HEMOGLOBIN g/dl 12 6 13 6  --   --    HEMATOCRIT %  --   --  40 4 39 2   HEMATOCRIT, ISTAT % 37 40  --   --    PLATELETS Thousands/uL  --   --  348 377   NEUTROS PCT %  --   --  85* 73   MONOS PCT %  --   --  5 5     Results from last 7 days   Lab Units 10/06/19  2259 10/06/19  2228 10/06/19  2227 10/02/19  0600 09/30/19  1843   SODIUM mmol/L  --   --  140  --  141   POTASSIUM mmol/L  --   --  4 7  --  4 6   CHLORIDE mmol/L  --   --  107  --  107   CO2 mmol/L  --   --  25  --  29   CO2, I-STAT mmol/L 26 27  --   --   --    ANION GAP mmol/L  --   --  8  --  5   BUN mg/dL  --   --  16  --  15   CREATININE mg/dL  --   --  1 55*  --  0 87   CALCIUM mg/dL  --   --  9 5  --  9 1   GLUCOSE RANDOM mg/dL  --   --  133  --  108   ALT U/L  --   --  78 150* 229*   AST U/L  --   --  37 73* 184*   ALK PHOS U/L  --   --  118* 115 138*   ALBUMIN g/dL  --   --  4 3 4 2 3 8   TOTAL BILIRUBIN mg/dL  --   --  0 62 0 40 0 15*     Results from last 7 days   Lab Units 10/06/19  2227   MAGNESIUM mg/dL 2 3               Results from last 7 days   Lab Units 10/07/19  0115 10/06/19  2227   LACTIC ACID mmol/L 1 0 2 6*     ABG:    VBG:            Micro:          EKG:  Sinus tachycardia  Imaging: I have personally reviewed pertinent reports        ------------------------------------------------------------------------------------------------------------  Advance Directive and Living Will:      Power of : POLST:      Anticipated Length of Stay is > 2 midnights    Counseling / Coordination of Care  Total Critical Care time spent 30 minutes excluding procedures, teaching and family updates  Trever Oviedo PA-C        Portions of the record may have been created with voice recognition software  Occasional wrong word or "sound a like" substitutions may have occurred due to the inherent limitations of voice recognition software    Read the chart carefully and recognize, using context, where substitutions have occurred

## 2019-10-08 VITALS
OXYGEN SATURATION: 98 % | WEIGHT: 267.64 LBS | TEMPERATURE: 97.8 F | RESPIRATION RATE: 18 BRPM | BODY MASS INDEX: 37.47 KG/M2 | HEART RATE: 88 BPM | HEIGHT: 71 IN | DIASTOLIC BLOOD PRESSURE: 89 MMHG | SYSTOLIC BLOOD PRESSURE: 142 MMHG

## 2019-10-08 LAB
ANION GAP SERPL CALCULATED.3IONS-SCNC: 4 MMOL/L (ref 4–13)
BASOPHILS # BLD AUTO: 0.04 THOUSANDS/ΜL (ref 0–0.1)
BASOPHILS NFR BLD AUTO: 1 % (ref 0–1)
BUN SERPL-MCNC: 10 MG/DL (ref 5–25)
CALCIUM SERPL-MCNC: 9.1 MG/DL (ref 8.3–10.1)
CHLORIDE SERPL-SCNC: 110 MMOL/L (ref 100–108)
CO2 SERPL-SCNC: 27 MMOL/L (ref 21–32)
CREAT SERPL-MCNC: 0.74 MG/DL (ref 0.6–1.3)
EOSINOPHIL # BLD AUTO: 0.36 THOUSAND/ΜL (ref 0–0.61)
EOSINOPHIL NFR BLD AUTO: 6 % (ref 0–6)
ERYTHROCYTE [DISTWIDTH] IN BLOOD BY AUTOMATED COUNT: 14.6 % (ref 11.6–15.1)
GFR SERPL CREATININE-BSD FRML MDRD: 125 ML/MIN/1.73SQ M
GLUCOSE SERPL-MCNC: 90 MG/DL (ref 65–140)
GLUCOSE SERPL-MCNC: 98 MG/DL (ref 65–140)
HCT VFR BLD AUTO: 37.2 % (ref 36.5–49.3)
HGB BLD-MCNC: 11.5 G/DL (ref 12–17)
IMM GRANULOCYTES # BLD AUTO: 0.02 THOUSAND/UL (ref 0–0.2)
IMM GRANULOCYTES NFR BLD AUTO: 0 % (ref 0–2)
INR PPP: 1.11 (ref 0.84–1.19)
LYMPHOCYTES # BLD AUTO: 2.12 THOUSANDS/ΜL (ref 0.6–4.47)
LYMPHOCYTES NFR BLD AUTO: 34 % (ref 14–44)
MCH RBC QN AUTO: 29.3 PG (ref 26.8–34.3)
MCHC RBC AUTO-ENTMCNC: 30.9 G/DL (ref 31.4–37.4)
MCV RBC AUTO: 95 FL (ref 82–98)
MONOCYTES # BLD AUTO: 0.78 THOUSAND/ΜL (ref 0.17–1.22)
MONOCYTES NFR BLD AUTO: 12 % (ref 4–12)
NEUTROPHILS # BLD AUTO: 2.96 THOUSANDS/ΜL (ref 1.85–7.62)
NEUTS SEG NFR BLD AUTO: 47 % (ref 43–75)
NRBC BLD AUTO-RTO: 0 /100 WBCS
PLATELET # BLD AUTO: 293 THOUSANDS/UL (ref 149–390)
PMV BLD AUTO: 11.3 FL (ref 8.9–12.7)
POTASSIUM SERPL-SCNC: 4 MMOL/L (ref 3.5–5.3)
PROTHROMBIN TIME: 13.9 SECONDS (ref 11.6–14.5)
RBC # BLD AUTO: 3.92 MILLION/UL (ref 3.88–5.62)
SODIUM SERPL-SCNC: 141 MMOL/L (ref 136–145)
WBC # BLD AUTO: 6.28 THOUSAND/UL (ref 4.31–10.16)

## 2019-10-08 PROCEDURE — 85025 COMPLETE CBC W/AUTO DIFF WBC: CPT | Performed by: INTERNAL MEDICINE

## 2019-10-08 PROCEDURE — 82948 REAGENT STRIP/BLOOD GLUCOSE: CPT

## 2019-10-08 PROCEDURE — 99239 HOSP IP/OBS DSCHRG MGMT >30: CPT | Performed by: FAMILY MEDICINE

## 2019-10-08 PROCEDURE — 90686 IIV4 VACC NO PRSV 0.5 ML IM: CPT | Performed by: INTERNAL MEDICINE

## 2019-10-08 PROCEDURE — 80048 BASIC METABOLIC PNL TOTAL CA: CPT | Performed by: INTERNAL MEDICINE

## 2019-10-08 PROCEDURE — 85610 PROTHROMBIN TIME: CPT | Performed by: INTERNAL MEDICINE

## 2019-10-08 RX ORDER — ARIPIPRAZOLE 5 MG/1
10 TABLET ORAL DAILY
Qty: 30 TABLET | Refills: 0 | Status: SHIPPED | OUTPATIENT
Start: 2019-10-08

## 2019-10-08 RX ORDER — CLONIDINE HYDROCHLORIDE 0.1 MG/1
0.1 TABLET ORAL
Qty: 30 TABLET | Refills: 0 | Status: SHIPPED | OUTPATIENT
Start: 2019-10-08

## 2019-10-08 RX ADMIN — LEVOTHYROXINE SODIUM 25 MCG: 25 TABLET ORAL at 06:28

## 2019-10-08 RX ADMIN — CARVEDILOL 6.25 MG: 6.25 TABLET, FILM COATED ORAL at 08:11

## 2019-10-08 RX ADMIN — PANTOPRAZOLE SODIUM 20 MG: 20 TABLET, DELAYED RELEASE ORAL at 06:28

## 2019-10-08 RX ADMIN — INFLUENZA VIRUS VACCINE 0.5 ML: 15; 15; 15; 15 SUSPENSION INTRAMUSCULAR at 10:37

## 2019-10-08 RX ADMIN — SERTRALINE HYDROCHLORIDE 150 MG: 50 TABLET ORAL at 08:12

## 2019-10-08 NOTE — DISCHARGE INSTRUCTIONS
Follow up with psychiatrist- call for appointment   Please call [facility] psychiatry intake as instructed when discharged [facility]

## 2019-10-08 NOTE — SOCIAL WORK
The patient is cleared for discharge home today  He reports he has called [facility] and left message to schedule appointment  Contact information was placed on AVS for [facility] CM called [facility] at [phone] and spoke to [facility] at Intake  He took information and will ask someone to call the patient to schedule assessment  Patient does not have ride home  [facility] transport was scheduled  Patient signed waiver form which was sent to Medical Records

## 2019-10-08 NOTE — PROGRESS NOTES
No significant events overnight  Patient calm and cooperative sleeping with fiance in room  No suicidal attempts were made  Continued observation with Ventura Salomon, P5 PCA, to maintain patient safety

## 2019-10-09 NOTE — ED ATTENDING ATTESTATION
10/6/2019  I, Carlos Shaw MD, saw and evaluated the patient  I have discussed the patient with the resident/non-physician practitioner and agree with the resident's/non-physician practitioner's findings, Plan of Care, and MDM as documented in the resident's/non-physician practitioner's note, except where noted  All available labs and Radiology studies were reviewed  I was present for key portions of any procedure(s) performed by the resident/non-physician practitioner and I was immediately available to provide assistance  At this point I agree with the current assessment done in the Emergency Department  I have conducted an independent evaluation of this patient a history and physical is as follows: This is a 77-year-old male who presents to the emergency department after polypharmacy overdose  The patient states that he took 450 mg tablets of Benadryl, and also took melatonin to help him sleep  The patient denies taking any benzodiazepines, and states he did not take more than 4 Benadryl  The patient denies taking any other ingestants  He denies self-harm, but states he was just trying to sleep  Patient denies any vomiting  He states that he took the pills about 2 and 0 5 hours ago  Patient denies any recent intercurrent illnesses  He denies shortness of breath, fevers, chills, cough, nausea, vomiting, or diarrhea  The patient's review of systems is otherwise negative in 12 systems reviewed, but patient's history is somewhat limited by his mental status  On exam the patient is tachycardic with a heart rate of 150  It is sinus on the monitor  He is mentating and answering questions, but has occasional dysconjugate gaze and roving eye movements  The patient has intact pulses  His skin is warm with good capillary refill  On secondary survey, the patient has constricted pupils of 1-2 mm which are essentially unreactive  The patient's oropharynx is dry  His neck is supple and nontender  He has no signs of trauma  He has no nuchal rigidity  His heart is regularly tachycardic without murmurs, rubs, or gallops  His lungs are clear bilaterally with good air movement throughout  Abdomen is soft and nontender  I cannot appreciate a bladder fundus  The patient's skin is dry  His extremities are warm with good pulses  The patient does not have rashes or skin changes  He does not have track marks  He moves all 4 spontaneously  He does not have any rigidity  He does not have any clonus  Impression:  Anticholinergic toxicity  IV access established via ultrasound  Rectal temp obtained, 102 7  Patient given 2 L of normal saline, consult placed to toxicology  Patient given benzodiazepines  Toxicology recommended intubation and sedation for hyperthermia, but patient repeat temperature is coming down  Patient's heart rate down to 130  Patient given additional fluids  Patient with brief episode of hypotension, resolved spontaneously  Patient still mentating and answering questions, overall his clinical status appears to be improving, so will defer intubation at this time due to improving clinical status, however patient has airway tray in the room should he require that intervention  Will plan to admit to the ICU for anticholinergic toxidrome      ED Course         Critical Care Time  CriticalCare Time  Performed by: Linette Ybarra MD  Authorized by: Linette Ybarra MD     Critical care provider statement:     Critical care time (minutes):  48    Critical care time was exclusive of:  Separately billable procedures and treating other patients and teaching time    Critical care was necessary to treat or prevent imminent or life-threatening deterioration of the following conditions:  Toxidrome    Critical care was time spent personally by me on the following activities:  Ordering and performing treatments and interventions, ordering and review of laboratory studies, ordering and review of radiographic studies, re-evaluation of patient's condition, review of old charts, examination of patient, evaluation of patient's response to treatment, discussions with primary provider, discussions with consultants, development of treatment plan with patient or surrogate, obtaining history from patient or surrogate and blood draw for specimens

## 2019-10-13 NOTE — DISCHARGE SUMMARY
Discharge Summary - [facility] Internal Medicine    Patient Information: Sandeep Hernandez 34 y o  male MRN: [id number]  Unit/Bed#: [facility] 501-01 Encounter: [id number]    Discharging Physician / Practitioner: Hansel Delcid MD  PCP: Warner Anthony DO  Admission Date: 10/6/2019  Discharge Date: 10/8/19  Disposition:     Home    Reason for Admission: overdose    Discharge Diagnoses:     Principal Problem:    Acute metabolic encephalopathy  Active Problems:    Overdose    Essential hypertension    Bipolar affective disorder, currently depressed, moderate ([facility] )    Type 2 diabetes mellitus without complication, without long-term current use of insulin ([facility])    Acquired hypothyroidism    Substance abuse ([facility] )    [facility]    Lactic acidosis    Hyperthermia    Atropine-like syndrome    Diphenhydramine overdose of undetermined intent    Benzodiazepine overdose of undetermined intent    Encephalopathy    Tachycardia    Upper respiratory tract infection  Resolved Problems:    * No resolved hospital problems  *      Consultations During Hospital Stay:  · psychiatry    Procedures Performed:     · Chest x ray- no acute pathology    Significant Findings / Test Results:     ·     Incidental Findings:   ·     Test Results Pending at Discharge (will require follow up):   ·      Outpatient Tests Requested:  ·     Complications:   none    Hospital Course:     Sandeep Henrandez is a 34 y o  male patient who originally presented to the hospital on 10/6/2019 due to acute metabolic encephalopathy secondary to Benadryl and Xanax overdose  Patient with known history of bipolar disorder currently with depressed mood and polysubstance abuse with recent psychiatric admission to the [facility]   Patient reported that he just wanted to sleep  Patient was hyperthermic in the emergency room felt to be secondary to antihistamine overdose    Patient also had acute kidney injury he was started on IV fluids with improvement in the creatinine  Acute kidney injury resolved while in the hospital   Patient was evaluated by Psychiatry and was taken off of the continuous observation of since it was felt that the patient is not suicidal at this point  Patient reported that he just wanted to sleep and so he took extra dose of the medication  Psych cleared the patient to be discharged and patient remained hemodynamically stable and afebrile  On 10/8/2019 patient wanted to be discharged and he was discharged in a stable condition with outpatient follow-up with the primary care physician and Psychiatry  Also recommended him to contact the [address] [place] [address] Rd intake as recommended at the time of discharge from the [facility]   Please refer to the chart for details    Condition at Discharge: good     Discharge Day Visit / Exam:     Subjective:  Chief patient seen and examined  Patient reported that he is feeling better and would like to be discharged  Discussed with Psychiatry and patient is stable for discharge from psych standpoint  Vitals: Blood Pressure: 142/89 (10/08/19 0700)  Pulse: 88 (10/08/19 0700)  Temperature: 97 8 °F (36 6 °C) (10/08/19 0700)  Temp Source: Oral (10/08/19 0700)  Respirations: 18 (10/08/19 0700)  Height: 5' 11" (180 3 cm) (10/08/19 0700)  Weight - Scale: 121 kg (267 lb 10 2 oz) (10/08/19 0700)  SpO2: 98 % (10/08/19 0900)  Exam:   Physical Exam   Constitutional: He appears well-developed  No distress  HENT:   Head: Normocephalic and atraumatic  Eyes: Pupils are equal, round, and reactive to light  Neck: Normal range of motion  Cardiovascular: Normal rate  Pulmonary/Chest: Effort normal    Abdominal: Soft  Musculoskeletal: Normal range of motion  Neurological: He is alert     Skin: Skin is warm      )  Discussion with Family: significant other    Discharge instructions/Information to patient and family:   See after visit summary for information provided to patient and family  Provisions for Follow-Up Care:  See after visit summary for information related to follow-up care and any pertinent home health orders  Planned Readmission:  none     Discharge Statement:  I spent 45 minutes discharging the patient  This time was spent on the day of discharge  I had direct contact with the patient on the day of discharge  Greater than 50% of the total time was spent examining patient, answering all patient questions, arranging and discussing plan of care with patient as well as directly providing post-discharge instructions  Additional time then spent on discharge activities  Discharge Medications:  See after visit summary for reconciled discharge medications provided to patient and family        ** Please Note: This note has been constructed using a voice recognition system **

## 2019-10-21 DIAGNOSIS — Z71.89 COMPLEX CARE COORDINATION: Primary | ICD-10-CM

## 2019-10-21 NOTE — UTILIZATION REVIEW
URGENT/EMERGENT  INPATIENT/SPU AUTHORIZATION REQUEST    Date: 10/21/19            # Pages in this Request:     x New Request   Additional Information for PA#:     Office Contact Name:  Renny Lloyd Title: Utilization Review, Bg Nurse     Phone: [phone]  Ext  Availability (Date/Time): Wednesday - Friday 8 am- 4 pm    x Inpatient Review  SPU Review        Current       x Late Pick-up   · How your facility was first notified of the Late Pick-up: Path[employer] Letter   · When your facility was first notified of the Late Pick-up (date): 10/17/2019         RECIPIENT INFORMATION    Recipient ID#: [id number]    Recipient Name: Zahra Herrmann     YOB: 1990  34 y o  Recipient Alias:     Gender:  x Male  Female Medicaid Eligibility ([address] [place] [address]): INSURANCE INFORMATION    (All other private or governmental health insurance benefits must be utilized prior to billing the [place] Program)    Was this admission the result of an MVA, other accident, assault, injury, fall, gunshot, bite etc ? Yes x No                   If yes, provide a brief description of the incident  Does the recipient have other insurance coverage? Yes x No        Insurance Company Name/Policy #      Did that insurance pay on this claim? Yes  No        Did that insurance deny this claim? Yes  No    If yes, reason for denial:      Does the recipient have Medicare? Yes x No        Did Medicare exhaust prior to this admission? Yes  No        Did Medicare partially pay this claim? Yes  No        Did that insurance deny this claim? Yes  No    If yes, reason for denial:          Was the recipient a prisoner at the time of admission?   Yes x No            PROVIDER INFORMATION    Hospital Name: [address] [place] [address] Provider ID#: [id number]    Admitting Physician Name: Dalila Joiner Provider ID#: [id number]        ADMISSION INFORMATION    Type of Admission: (please choose one)    x ED      Direct    If yes, from where? Transfer    If yes, transferring hospital (inpatient, rehab, or psych) Provider Name/Provider ID#: Admission Floor or Unit Type: level 1 Step Down     Dates/Times:        ED Date/Time: 10/6/2019 10:09 PM        Observation Date/Time:          Admission Date/Time: 10/7/19 0130        Discharge or Transfer Date/Time: 10/8/2019 11:20 AM        DIAGNOSIS/PROCEDURE CODES    Primary Diagnosis Code/Primary Diagnosis Code description:  T50 901A Poisoning by unspecified drugs, medicaments and biological substances, accidental (unintentional), initial encounter   T45 0X1A Poisoning by antiallergic and antiemetic drugs, accidental (unintentional), initial encounter  N17 9 Acute kidney failure, unspecified  Additional Diagnosis Code(s) and Description(s)-(up to three additional codes):    Procedure Code (one) and description:        CLINICAL INFORMATION - PRIOR ADMISSION ONLY    Is there a prior admission with a discharge date within 30 days of the date of this admission?    x No (Proceed to the next section - "Clinical Information - General Review Checklist:)      Yes (Provide the following information)     Prior admission dates:    MA Prior Authorization Number:        Review Outcome:     Diagnosis Code(s)/Description:    Procedure Code/Description:    Findings:    Treatment:    Condition on Discharge:   Vitals:    Labs:   Imaging:   Medications: Follow-up Instructions:    Disposition:        CLINICAL INFORMATION - GENERAL REVIEW CHECKLIST    EMERGENCY DEPARTMENT: (Proceed to "ADMISSION" if Direct Admission)    Presenting Signs/Symptoms:  34year old male with PMHx polysubstance abuse, bipolar ,depression  EMS to [facility] ED 2nd altered mental status after acute polypharmacy overdose, intention unknown     In ED denied intentional overdose + stated 2nd insomnia - took a combination of Benadryl approximately 200 mg , Xanax and melatonin    In the ED - fever spiked to 103F, noted to be tachycardic to 150 and tachypneic  Lactic acid 2 6,   Creatinine 1 55  (Baseline 0 87 on September 30)      UDS + Barbituates, Benzodiazepines + Opiates   No EKG changes per H+P  ED TX - IVF NSS BOLUS X 6 L, IV ATIVAN X 1    ADMITTED INPATIENT TO LEVEL 1 STEPDOWN 10/7/19 AT 0130 2ND TO ACUTE DIPHENHYDRAMINE + ALPRAZOLAM OVERDOSE WITH ENCEPHALOPATHY + TACHYCARDIA, BRANT  TX - TELEMETRY, VS + NEURO CHECKS Q1-2HRS, 1:1 CONTINUOUS OBSERVATION, MEDICAL TOXICOLOGY CONSULTED    Medication/treatment prior to arrival in the ED:    Past Medical History:    Active Ambulatory Problems     Diagnosis Date Noted    Overdose 12/09/2018    Acute metabolic encephalopathy 16/16/5934    Anxiety 12/09/2018    Essential hypertension 12/09/2018    Transaminitis 12/09/2018    Asthma 12/10/2018    Bipolar affective disorder, currently depressed, moderate ([address] ) 12/12/2018    Generalized anxiety disorder 12/12/2018    Cannabis abuse 12/12/2018    Chest pain on breathing 12/13/2018    Morbid obesity due to excess calories ([address] ) 12/13/2018    Type 2 diabetes mellitus without complication, without long-term current use of insulin ([address] ) 12/13/2018    Acquired hypothyroidism 12/13/2018    Substance abuse ([address] ) 12/18/2018    Opiate withdrawal ([address] )     Opiate abuse, continuous ([address] )     Benzodiazepine abuse ([address] )     Smoking addiction 10/03/2019    Reflux esophagitis 10/03/2019     Past Medical History:   Diagnosis Date    COPD (chronic obstructive pulmonary disease) ([address] )     Depression     Diabetes mellitus ([address] )     Disease of thyroid gland     Hypertension     IBS (irritable bowel syndrome)     Psychiatric disorder     Psychiatric illness        Clinical Exam:    Initial Vital Signs: (Temp, Pulse, Resp, and BP)   ED Triage Vitals   Temperature Pulse Respirations Blood Pressure SpO2   10/06/19 2228 10/06/19 2218 10/06/19 2218 10/06/19 2218 10/06/19 2218   (!) 102 7 °F (39 3 °C) (!) 145 16 127/60 95 % Temp Source Heart Rate Source Patient Position - Orthostatic VS BP Location FiO2 (%)   10/06/19 2228 10/07/19 0314 10/07/19 0314 10/07/19 0314 --   Rectal Radial Lying Left arm       Pain Score       10/06/19 2218       No Pain           Pertinent Repeat Vital Signs: (include times they were obtained)  10/07/19 0314   98 6 °F (37 °C)   108   24Abnormal    135/68   96 %   Nasal cannula     10/07/19 0215   100 °F (37 8 °C)   108Abnormal    25Abnormal    131/64   97 %        10/07/19 0145   100 °F (37 8 °C)   110Abnormal    21   132/67   100 %        10/07/19 0115   100 4 °F (38 °C)   116Abnormal    29Abnormal    133/77   97 %           Pertinent Sustained Findings: (include times they were obtained)    Weight in Kilograms:  Wt Readings from Last 1 Encounters:   10/08/19 121 kg (267 lb 10 2 oz)       Pertinent Labs (results):  Pertinent Labs/Diagnostic Test Results:   Lab Units 10/07/19  0501 10/06/19  2259 10/06/19  2228 10/06/19  2227   WBC Thousand/uL 11 05*  --   --  11 23*   HEMOGLOBIN g/dL 10 6*  --   --  12 8   I STAT HEMOGLOBIN g/dl  --  12 6 13 6  --    HEMATOCRIT % 34 2*  --   --  40 4   HEMATOCRIT, ISTAT %  --  37 40  --    PLATELETS Thousands/uL 263  --   --  348   NEUTROS ABS Thousands/µL  --   --   --  9 45*      Lab Units 10/07/19  0500 10/06/19  2259 10/06/19  2228 10/06/19  2227   SODIUM mmol/L 143  --   --  140   POTASSIUM mmol/L 3 7  --   --  4 7   CHLORIDE mmol/L 111*  --   --  107   CO2 mmol/L 26  --   --  25   CO2, I-STAT mmol/L  --  26 27  --    ANION GAP mmol/L 6  --   --  8   BUN mg/dL 13  --   --  16   CREATININE mg/dL 1 12  --   --  1 55*   EGFR ml/min/1 73sq m 88  --   --  60   CALCIUM mg/dL 8 3  --   --  9 5   CALCIUM, IONIZED, ISTAT mmol/L  --  1 19 0 98*  --    MAGNESIUM mg/dL 2 3  --   --  2 3   PHOSPHORUS mg/dL 2 4*  --   --   --              Results from last 7 days   Lab Units 10/06/19  2227 10/02/19  0600 09/30/19  1843   AST U/L 37 73* 184*   ALT U/L 78 150* 229*   ALK PHOS U/L 118* 115 138*   TOTAL PROTEIN g/dL 8 0 8 0 7 6   ALBUMIN g/dL 4 3 4 2 3 8   TOTAL BILIRUBIN mg/dL 0 62 0 40 0 15*   BILIRUBIN DIRECT mg/dL  --  0 20  --       Lab Units 10/07/19  1109 10/07/19  0643   POC GLUCOSE mg/dl 108 109            Results from last 7 days   Lab Units 10/06/19  2259 10/06/19  2228   PH, IQRA I-STAT   7 390 7 498*   PCO2, IQRA ISTAT mm HG 41 2* 33 6*   PO2, IQRA ISTAT mm HG 73 0* 78 0*   HCO3, IQRA ISTAT mmol/L 24 9 26 0   I STAT BASE EXC mmol/L 0 3   I STAT O2 SAT % 94* 97           Results from last 7 days   Lab Units 10/06/19  2227   CK TOTAL U/L 141            Results from last 7 days   Lab Units 10/07/19  0500 10/02/19  0600   TSH 3RD GENERATON uIU/mL 1 760 0 309*            Results from last 7 days   Lab Units 10/06/19  2227    LACTIC ACID mmol/L 2 6*            Results from last 7 days   Lab Units 10/07/19  0231   AMPH/METH   Negative   BARBITURATE UR   Positive*   BENZODIAZEPINE UR   Positive*   COCAINE UR   Negative   METHADONE URINE   Negative   OPIATE UR   Positive*   PCP UR   Negative   THC UR   Negative       Radiology (results):    EKG (results):      Other tests (results):    Tests pending final results:    Treatment in the ED:   Medication Administration from 10/06/2019 2209 to 10/07/2019 0245       Date/Time Order Dose Route Action     10/06/2019 2300 sodium chloride 0 9 % bolus 1,000 mL 1,000 mL Intravenous New Bag     10/06/2019 2310 LORazepam (ATIVAN) 2 mg/mL injection 2 mg 2 mg Intravenous Given     10/06/2019 2312 lidocaine (URO-JET) 2 % urethral/mucosal gel 1 application 1 application Urethral Given     10/07/2019 0045 sodium chloride 0 9 % bolus 1,000 mL 1,000 mL Intravenous New Bag     10/06/2019 2306 sodium chloride 0 9 % bolus 1,000 mL 1,000 mL Intravenous New Bag           Other treatments:      Change in condition while in the ED:     Response to ED Treatment:          OBSERVATION: (Proceed to "ADMISSION" if Direct Admission)    Orders written during the observation period  Meds Name, dose, route, time, how may doses given:  PRN Meds Name, dose, route, time, how many doses given within the first 24 hrs :  IVs Type, rate, and total amt  ordered/given:  Labs, imaging, other:  Consults and findings:    Test Results during the observation period  Pertinent Lab tests (dates/results):  Culture results (blood, urine, spinal, wound, respiratory, etc ):  Imaging tests (dates/results):  EKG (dates/results):   Other test (dates/results):  Tests pending (dates/results):    Surgical or Invasive Procedures during the observation period  Name of surgery/procedure:  Date & Time:  Patient Response:  Post-operative orders:  Operative Report/Findings:    Response to Treatment, Major Change in Condition, Major Charge in Treatment during the observation period          ADMISSION:    DIRECT Admissions Only:    · Presenting Signs/Symptoms:   ·   · Medication/treatment prior to arrival:  ·   · Past Medical History:  ·   · Clinical Exam on admission:  ·   · Vital Signs on admission: (Temp, Pulse, Resp, and BP)  ·   · Weight in kilograms:     ALL Admissions:    Admission Orders and Other Orders written within the first 24 hrs after admission    TELEMETRY  VS + NEURO CHECKS Q1-2HRS  1:1 CONTINUOUS OBSERVATION  Diet [place]/CHO Controlled; Consistent Carbohydrate Diet Level 1 (4 carb servings/60 grams CHO/meal)       Meds Name, dose, route, time, how may doses given:  acetaminophen 650 mg Oral Q6H PRN   albuterol 2 puff Inhalation Q4H PRN   carvedilol 6 25 mg Oral BID With Meals   docusate sodium 100 mg Oral BID   fluticasone-vilanterol 1 puff Inhalation Daily   hydrALAZINE 5 mg Intravenous Q6H PRN   influenza vaccine 0 5 mL Intramuscular Once   insulin lispro 2-12 Units Subcutaneous TID AC   insulin lispro 2-12 Units Subcutaneous HS   lactated ringers 125 mL/hr Intravenous Continuous   levothyroxine 25 mcg Oral Early Morning   LORazepam 1 mg Intravenous Q2H PRN   nicotine 1 patch Transdermal Daily ondansetron 4 mg Intravenous Q6H PRN   pantoprazole 20 mg Oral Early Morning   polyethylene glycol 17 g Oral Daily PRN   senna 1 tablet Oral Daily       PRN Meds Name, dose, route, time, how many doses given within the first 24 hrs :  IVs Type, rate, and total amt  ordered/given:  Labs, imaging, other:      Consults and findings:Medical Toxicology - 10/7 - RECOMMENDATIONS:  Please continue supportive care until the patient is asymptomatic  Upon my evaluation, the patient had anticholinergic symptoms without anticholinergic toxidrome, and symptoms of upper respiratory infection  It is likely that the patient overdosed on diphenhydramine and alprazolam as the primary factor in his presentation, given the extent of his tachycardia, hyperthermia, and encephalopathy  It appears he had full anticholinergic toxidrome on presentation  This would not be consistent with overdosing on 4 diphenhydramine as he currently states  He did state that he overdosed on 20 tablets in the emergency department, which would be more consistent        Additional overdose agents include alprazolam and melatonin  Alprazolam is not routinely detected in urine drug screen when taken therapeutically  Alprazolam overdose likely served as accidentally assisting in treatment for his diphenhydramine overdose  Melatonin overdose is not toxic       His urine drug screen was also positive for barbiturates and opiates  It is unclear where he may have obtained barbiturates  However, opiate positive urine drug screens are specifically the results morphine, codeine, and metabolites of heroin  Other agents may trigger a false positive in excessive overdose  He denied any use of opioids to me  In this setting, suspicion should exist for substance abuse/dependency  Please provide rehabilitation resources and options      There is also evidence of upper respiratory infection with wheezing and cough    The said of his tachycardia and hyperthermia are not consistent with upper respiratory infection or pneumonia, a but mixed picture is present  This may be associated with aspiration prior to presentation verses underlying, pre-existing URI  I would suggest obtaining a chest XR, any other further diagnostic tests deemed necessary, along with appropriate treatment      Please hold the patient's sertraline, aripiprazole, and trazodone until his mental status fully improved  Please discontinue the q 6 hour p r n  Carvedilol  This is not necessary to treat tachycardia associated with anticholinergic toxicity or infection  His regular carvedilol may be given b i d  As generally taken      Patient requires psychiatric evaluation  Test Results after admission  Pertinent Lab tests (dates/results):  Culture results (blood, urine, spinal, wound, respiratory, etc ):  Imaging tests (dates/results):  EKG (dates/results): Other test (dates/results):  Tests pending (dates/results):    Surgical or Invasive Procedures  Name of surgery/procedure:  Date & Time:  Patient Response:  Post-operative orders:  Operative Report/Findings:    Response to Treatment, Major Change in Condition, Major Charge in Treatment anytime during admission  Lupe Arriaza is a 34 y o  male patient who originally presented to the hospital on 10/6/2019 due to acute metabolic encephalopathy secondary to Benadryl and Xanax overdose  Patient with known history of bipolar disorder currently with depressed mood and polysubstance abuse with recent psychiatric admission to the [facility]   Patient reported that he just wanted to sleep  Patient was hyperthermic in the emergency room felt to be secondary to antihistamine overdose  Patient also had acute kidney injury he was started on IV fluids with improvement in the creatinine    Acute kidney injury resolved while in the hospital   Patient was evaluated by Psychiatry and was taken off of the continuous observation of since it was felt that the patient is not suicidal at this point  Patient reported that he just wanted to sleep and so he took extra dose of the medication  Psych cleared the patient to be discharged and patient remained hemodynamically stable and afebrile  On 10/8/2019 patient wanted to be discharged and he was discharged in a stable condition with outpatient follow-up with the primary care physician and Psychiatry  Also recommended him to contact the [facility] intake as recommended at the time of discharge from the [facility]   Please refer to the chart for details     Disposition on Discharge  Home, Rehab, SNF, LTC, Shelter, etc : Home/Self Care    Cease to Breathe (CTB)  If a patient expires during an admission, in addition to the above information, please include:    Summary/timeline of the patient's decline in condition:    Medications and treatment:    Patient response to treatment:    Date and time patient ceased to breathe:        Is there a Readmission that follows this admission? Yes x No    If yes, provide dates:          InterQual Review    InterQual Criteria Met: x Yes  No  N/A        Please include the InterQual Review, InterQual year/version used, and the criteria selected:   Created Using Review Status Review Entered   [employer]® In Primary 10/7/2019 11:49       Criteria Set Name - Subset   LOC:Acute Adult-General Medical       Criteria Review   REVIEW SUMMARY     Patient: Bianca Noel  Review Number: [id number]  Review Status:  In Primary  Criteria Status: Intermediate Met  Day Met: Episode Day 1     Condition Specific: Yes        OUTCOMES  Outcome Type: Primary           REVIEW DETAILS     Product: [place] Adult  Subset: General Medical      (Symptom or finding within 24h)         (Excludes PO medications unless noted)          [X] Select Day, One:              [X] Episode Day 1, One:                  [X] INTERMEDIATE, >= One:                      [X] Toxic exposure or ingestion, One:                          [X] Other toxic exposure or ingestion, actual or suspected, All:                              [X] Potential for significant arrhythmia                              ~--Admin, IQ Admin Admin on 10- 11:49 AM--~                              Anticholinergic intoxication                                                                                                                        [X] Electrocardiogram (ECG) normal, unchanged, or nondiagnostic                              [X] Continuous cardiac monitoring (excludes Holter)     Version: [employer] 2018  2  [employer]® and [employer]®  © 2018 [employer] and/or one of its [place]  All Rights Reserved  CPT only © 2017 [employer] Association  All Rights Reserved  PLEASE SUBMIT THE COMPLETED FORM TO THE DEPARTMENT OF HUMAN SERVICES - DIVISION OF CLINICAL  REVIEW VIA FAX -[phone] or VIA E-MAIL TO [employer]    Signature: Tonette Goodpasture Date:  10/21/19    Confidentiality Notice: The documents accompanying this telecopy may contain confidential information belonging to the sender  The information is intended only for the use of the individual named above  If you are not the intended recipient, you are hereby notified  That any disclosure, copying, distribution or taking of any telecopy is strictly prohibited

## 2021-06-28 NOTE — ED NOTES
Pt ringing, pt states "can you give me some happy juice?" Asked pt to explain what he meant, pt states he would like something for pain  Reports pain 9/10 in his right knee  Pt states he takes Motrin, Alive, and Percocet at home when the "pain gets too bad"  Denies any difficulty with Motrin despite Toradol allergy   Dr Oscar Stapleton updated on pt request and condition  i     Wale Valle RN  02/27/19 5628
X-ray at bedside        Louie Hayes RN  02/27/19 3026
Patient states she quit smoking o\approximately 1 year ago. Patient denies use of alcohol. Patient lives with her  and son.

## 2022-04-21 NOTE — ED ATTENDING ATTESTATION
Alex Lopez MD, saw and evaluated the patient  All available labs and X-rays were ordered by me or the resident and have been reviewed by myself  I discussed the patient with the resident / non-physician and agree with the resident's / non-physician practitioner's findings and plan as documented in the resident's / non-physician practicitioner's note, except where noted  At this point, I agree with the current assessment done in the ED  Chief Complaint   Patient presents with    Overdose - Accidental     pt lethargic, slurred speech, as per EMS pts significant other reports pt took xanax and drank cough medicine  Straw with white powder in it found on pt's posession  This is a 30 y/o M presenting for AMS  Patient can't give me hx  Per EMS, the patient about 3 hours PTA took xanax + another medications  Per EMS, the patient's girlfriend said that the patient stole her xanax  He was found with a straw and white powder on him, slurred speech, altered  A: intact  B: bilaterally clear  C: 2+ pulses throughout  D: sedated, pupils 4mm, reactive  No clonus  No hyper-reflexia  Won't follow commands though  When sternal rubbed and forced speech, he would be fine for about 5-10 seconds but have very very slurred speech then fall asleep again  No marks of IVDU  E: afebrile  Because of the sonorous respirations and acute AMS, we did a dose of flumazenil  About 45 seconds after administering, the patient immediately woke up and started to stare around the room   Appeared in no distress  PE:  Vitals:    12/11/18 0650 12/11/18 0726 12/11/18 1335 12/11/18 1535   BP: 135/87   123/82   BP Location:       Pulse: 100   (!) 110   Resp: 18   20   Temp: 97 9 °F (36 6 °C)   97 5 °F (36 4 °C)   TempSrc:       SpO2:  100% 98% 97%   Weight:       Height:       A/P:  - given response to flumazenil, likely large benzo component to the OD  - EKG  - IVF  - monitor  - discuss with girlfriend what the extent of ingestion was  - 13 point ROS was performed and all are normal unless stated in the history above  - Nursing note reviewed  Vitals reviewed  - Orders placed by myself and/or advanced practitioner / resident     - Previous chart was reviewed  - No language barrier    - History obtained from patient  - There are limitations to the history obtained  Reasons ROS could not be obtained: OD  - Critical care time: 32 minutes  - Critical care time was exclusive of seperately bilable procedures and treating other patients as well as teaching time  - Critical care was necessary to treat or prevent imminent or life-threatening deterioration of the following condition: Drug overdose  - Critical care time was spent personally by me on the following activities as well as the above as per the ED course and rest of chart: blood draw for specimens, obtaining history from patient / surrogate, developement of a treatment plan, evaluation of patient's response to the treatment, examination of the patient, ordering/performing treatements and interventions, re-evaluation of the patient's condition, review of old charts    Final Diagnosis:  1  Benzodiazepine overdose of undetermined intent, initial encounter    2  Ecstasy abuse ([facility] [address] )    3  Antitussive overdose    4  Altered mental status    5  Drug-seeking behavior    6  Depression with suicidal ideation    7  Anticholinergic drug overdose    8   Drug overdose, undetermined intent, initial encounter           Medications   naloxone (NARCAN) 0 04 mg/mL syringe 0 04 mg (not administered)   nicotine (NICODERM CQ) 14 mg/24hr TD 24 hr patch 1 patch (1 patch Transdermal Not Given 12/11/18 0936)   albuterol (PROVENTIL HFA,VENTOLIN HFA) inhaler 2 puff (not administered)   ipratropium (ATROVENT) 0 02 % inhalation solution 0 5 mg (0 5 mg Nebulization Not Given 12/11/18 2000)   levalbuterol (XOPENEX) inhalation solution 1 25 mg (1 25 mg Nebulization Not Given 12/11/18 2000)   fluticasone-vilanterol (BREO ELLIPTA) 100-25 mcg/inh inhaler 1 puff (1 puff Inhalation Given 12/11/18 0937)   montelukast (SINGULAIR) tablet 10 mg (10 mg Oral Given 12/10/18 2102)   lidocaine (LIDODERM) 5 % patch 1 patch (1 patch Topical Medication Applied 12/11/18 0935)   lisinopril (ZESTRIL) tablet 20 mg (20 mg Oral Given 12/11/18 1755)   pantoprazole (PROTONIX) EC tablet 20 mg (20 mg Oral Given 12/11/18 0625)   hydrochlorothiazide (HYDRODIURIL) tablet 25 mg (25 mg Oral Given 12/11/18 1755)   sertraline (ZOLOFT) tablet 100 mg (100 mg Oral Given 12/11/18 0935)   ALPRAZolam (XANAX) tablet 1 mg (1 mg Oral Given 12/11/18 1919)   flumazenil (ROMAZICON) injection 0 5 mg (0 5 mg Intravenous Given 12/9/18 1141)   sodium chloride 0 9 % bolus 1,000 mL (0 mL Intravenous Stopped 12/9/18 1430)   physostigmine salicylate (ANTILIRIUM) injection 2 mg (2 mg Intravenous Given 12/9/18 1745)   acetylcysteine (ACETADOTE) 15,000 mg in dextrose 5 % 200 mL IVPB (0 mg Intravenous Stopped 12/10/18 1434)   acetylcysteine (ACETADOTE) 5,000 mg in dextrose 5 % 500 mL IVPB (0 mg Intravenous Stopped 12/10/18 1435)   ipratropium (ATROVENT) 0 02 % inhalation solution **ADS Override Pull** (0 5 mg  Given 12/9/18 2129)   levalbuterol (XOPENEX) 1 25 mg/0 5 mL inhalation solution **ADS Override Pull** (1 25 mg  Given 12/9/18 2129)   traMADol (ULTRAM) tablet 50 mg (50 mg Oral Given 12/11/18 1605)   ibuprofen (MOTRIN) tablet 400 mg (400 mg Oral Given 12/10/18 2300)   gabapentin (NEURONTIN) capsule 100 mg (100 mg Oral Given 12/11/18 0111)   melatonin tablet 3 mg (3 mg Oral Given 12/11/18 0111)   melatonin tablet 3 mg (3 mg Oral Given 12/11/18 9941)   influenza vaccine, quadrivalent (FLULAVAL) IM injection 0 5 mL (0 5 mL Intramuscular Given 12/11/18 1940)     CT head without contrast   Final Result      No acute intracranial abnormality                    Workstation performed: [id number]           Orders Placed This Encounter   Procedures    ED ECG Documentation Only    CT head without contrast    Rapid drug screen, urine    Ethanol    Salicylate level    Acetaminophen level    CBC and differential    Comprehensive metabolic panel    Comprehensive metabolic panel    Magnesium    Phosphorus    CBC (With Platelets)    Hepatitis panel, acute    Comprehensive metabolic panel    CK    TSH, 3rd generation with Free T4 reflex    CBC and differential    Comprehensive metabolic panel    T4, free    Diet Regular; Regular House    Nursing communcation Continue IV as ordered      Notify admitting physician    Notify admitting physician on arrival    Vital Signs per unit routine    24 Hour Telemetry Monitoring    Up and OOB as tolerated    Ambulate patient    Urinary retention protocol    Continual Observation    Continual Observation    Aqua K    Ice to affected area    Level 1-Full Code: all life saving measures are indicated    Inpatient consult to Toxicology    Inpatient consult to Psychiatry    Inpatient consult to Case Management    Respiratory Protocol    EKG RESULTS    ECG 12 lead    ECG 12 lead    ECG 12 lead    Place in Observation (expected length of stay for this patient is less than two midnights)    Inpatient Admission    Discharge patient     Labs Reviewed   SALICYLATE LEVEL - Abnormal        Result Value Ref Range Status    Salicylate Lvl <3 (*) 3 - 20 mg/dL Final   ACETAMINOPHEN LEVEL - Abnormal     Acetaminophen Level <2 (*) 10 - 30 ug/mL Final   CBC AND DIFFERENTIAL - Abnormal     WBC 8 41  4 31 - 10 16 Thousand/uL Final    RBC 4 07  3 88 - 5 62 Million/uL Final    Hemoglobin 12 4  12 0 - 17 0 g/dL Final    Hematocrit 39 7  36 5 - 49 3 % Final    MCV 98  82 - 98 fL Final    MCH 30 5  26 8 - 34 3 pg Final    MCHC 31 2 (*) 31 4 - 37 4 g/dL Final    RDW 13 8  11 6 - 15 1 % Final    MPV 11 7  8 9 - 12 7 fL Final    Platelets 378  956 - 390 Thousands/uL Final    nRBC 0  /100 WBCs Final    Neutrophils Relative 58  43 - 75 % Final    Immat GRANS % 0  0 - 2 % Final    Lymphocytes Relative 26  14 - 44 % Final    Monocytes Relative 7  4 - 12 % Final    Eosinophils Relative 8 (*) 0 - 6 % Final    Basophils Relative 1  0 - 1 % Final    Neutrophils Absolute 4 87  1 85 - 7 62 Thousands/µL Final    Immature Grans Absolute 0 03  0 00 - 0 20 Thousand/uL Final    Lymphocytes Absolute 2 22  0 60 - 4 47 Thousands/µL Final    Monocytes Absolute 0 59  0 17 - 1 22 Thousand/µL Final    Eosinophils Absolute 0 66 (*) 0 00 - 0 61 Thousand/µL Final    Basophils Absolute 0 04  0 00 - 0 10 Thousands/µL Final   COMPREHENSIVE METABOLIC PANEL - Abnormal     Sodium 139  136 - 145 mmol/L Final    Potassium 3 7  3 5 - 5 3 mmol/L Final    Chloride 106  100 - 108 mmol/L Final    CO2 26  21 - 32 mmol/L Final    ANION GAP 7  4 - 13 mmol/L Final    BUN 11  5 - 25 mg/dL Final    Creatinine 1 04  0 60 - 1 30 mg/dL Final    Comment: Standardized to IDMS reference method    Glucose 112  65 - 140 mg/dL Final    Comment:   If the patient is fasting, the ADA then defines impaired fasting glucose as > 100 mg/dL and diabetes as > or equal to 123 mg/dL  Specimen collection should occur prior to Sulfasalazine administration due to the potential for falsely depressed results  Specimen collection should occur prior to Sulfapyridine administration due to the potential for falsely elevated results  Calcium 9 6  8 3 - 10 1 mg/dL Final    AST 60 (*) 5 - 45 U/L Final    Comment:   Specimen collection should occur prior to Sulfasalazine administration due to the potential for falsely depressed results   (*) 12 - 78 U/L Final    Comment:   Specimen collection should occur prior to Sulfasalazine and/or Sulfapyridine administration due to the potential for falsely depressed results       Alkaline Phosphatase 126 (*) 46 - 116 U/L Final    Total Protein 7 6  6 4 - 8 2 g/dL Final    Albumin 4 0  3 5 - 5 0 g/dL Final    Total Bilirubin 0 20  0 20 - 1 00 mg/dL Final    eGFR 97  ml/min/1 73sq m Final Narrative:     National Kidney Disease Education Program recommendations are as follows:  GFR calculation is accurate only with a steady state creatinine  Chronic Kidney disease less than 60 ml/min/1 73 sq  meters  Kidney failure less than 15 ml/min/1 73 sq  meters  COMPREHENSIVE METABOLIC PANEL - Abnormal     Sodium 137  136 - 145 mmol/L Final    Potassium 4 7  3 5 - 5 3 mmol/L Final    Comment: Slightly Hemolyzed; Results May be Affected    Chloride 104  100 - 108 mmol/L Final    CO2 27  21 - 32 mmol/L Final    ANION GAP 6  4 - 13 mmol/L Final    BUN 10  5 - 25 mg/dL Final    Creatinine 0 94  0 60 - 1 30 mg/dL Final    Comment: Standardized to IDMS reference method    Glucose 148 (*) 65 - 140 mg/dL Final    Comment:   If the patient is fasting, the ADA then defines impaired fasting glucose as > 100 mg/dL and diabetes as > or equal to 123 mg/dL  Specimen collection should occur prior to Sulfasalazine administration due to the potential for falsely depressed results  Specimen collection should occur prior to Sulfapyridine administration due to the potential for falsely elevated results  Glucose, Fasting 148 (*) 65 - 99 mg/dL Final    Comment:   Specimen collection should occur prior to Sulfasalazine administration due to the potential for falsely depressed results  Specimen collection should occur prior to Sulfapyridine administration due to the potential for falsely elevated results  Calcium 9 4  8 3 - 10 1 mg/dL Final    AST 69 (*) 5 - 45 U/L Final    Comment: Slightly Hemolyzed; Results May be Affected  Specimen collection should occur prior to Sulfasalazine administration due to the potential for falsely depressed results   (*) 12 - 78 U/L Final    Comment:   Specimen collection should occur prior to Sulfasalazine and/or Sulfapyridine administration due to the potential for falsely depressed results       Alkaline Phosphatase 120 (*) 46 - 116 U/L Final    Total Protein 7 9  6 4 - 8 2 g/dL Final    Albumin 4 1  3 5 - 5 0 g/dL Final    Total Bilirubin 0 20  0 20 - 1 00 mg/dL Final    eGFR 110  ml/min/1 73sq m Final    Narrative:     National Kidney Disease Education Program recommendations are as follows:  GFR calculation is accurate only with a steady state creatinine  Chronic Kidney disease less than 60 ml/min/1 73 sq  meters  Kidney failure less than 15 ml/min/1 73 sq  meters  MEDICAL ALCOHOL - Normal    Ethanol Lvl <3  0 - 3 mg/dL Final   COMA PANEL    Narrative: The following orders were created for panel order Coma Panel  Procedure                               Abnormality         Status                     ---------                               -----------         ------                     Ethanol[[id number]]                      Normal              Final result               Salicylate UIYEV[[id number]]             Abnormal            Final result               Acetaminophen level[[id number]]          Abnormal            Final result                 Please view results for these tests on the individual orders       Time reflects when diagnosis was documented in both MDM as applicable and the Disposition within this note     Time User Action Codes Description Comment    12/9/2018  3:18 PM Lovette Jg Add [T42 4X4A] Benzodiazepine overdose of undetermined intent, initial encounter     12/9/2018  3:18 PM Lovette Jg Add [F16 10] Ecstasy abuse ([facility] [address] )     12/9/2018  3:19 PM Lovette Jg Add [Q89 6H5E] Antitussive overdose     12/9/2018  3:19 PM Lovette Jg Add [R41 82] Altered mental status     12/9/2018  3:20 PM Lovette Jg Add [Z76 5] Drug-seeking behavior     12/9/2018  3:21 PM Lovette Jg Add [F32 9] Depression     12/9/2018  3:21 PM Lovette Jg Add [F32 9,  K56 013] Depression with suicidal ideation     12/9/2018  3:21 PM Lovette Jg Remove [F32 9] Depression     12/9/2018  6:21 PM Lovette Jg Add [D40 2K1D] Anticholinergic drug overdose 12/11/2018  2:17 PM Rita Calero [T50 904A] Drug overdose, undetermined intent, initial encounter     12/11/2018  2:17 PM Joshua, Jake1 Jewel Hurt Se Drug overdose, undetermined intent, initial encounter       ED Disposition     ED Disposition Condition Comment    Admit  Case was discussed with YOLIS and the patient's admission status was agreed to be Admission Status: observation status to the service of Dr Elaine Edmonds          Follow-up Information    None       Discharge Medication List as of 12/11/2018  8:14 PM      CONTINUE these medications which have NOT CHANGED    Details   albuterol (2 5 mg/3 mL) 0 083 % nebulizer solution Take 2 5 mg by nebulization as needed for wheezing or shortness of breath, Historical Med      ALPRAZolam (XANAX) 1 mg tablet Take 1 mg by mouth 3 (three) times a day as needed for anxiety, Historical Med      aspirin 325 mg tablet Take 325 mg by mouth daily, Historical Med      butalbital-acetaminophen-caffeine-codeine (FIORICET WITH CODEINE) -88-30 MG per capsule Take 1 capsule by mouth 2 (two) times a day, Historical Med      carvedilol (COREG) 6 25 mg tablet Take 6 25 mg by mouth 2 (two) times a day with meals, Historical Med      dicyclomine (BENTYL) 10 mg capsule Take 10 mg by mouth 4 (four) times a day (before meals and at bedtime), Historical Med      fluticasone-salmeterol (ADVAIR HFA) 45-21 MCG/ACT inhaler Inhale 2 puffs 2 (two) times a day Rinse mouth after use , Historical Med      hydrochlorothiazide (HYDRODIURIL) 25 mg tablet Take 25 mg by mouth 2 (two) times a day, Historical Med      lisinopril (ZESTRIL) 20 mg tablet Take 20 mg by mouth 2 (two) times a day, Historical Med      methocarbamol (ROBAXIN) 500 mg tablet Take 500 mg by mouth daily at bedtime, Historical Med      montelukast (SINGULAIR) 10 mg tablet Take 10 mg by mouth daily at bedtime, Historical Med      omeprazole (PriLOSEC) 20 mg delayed release capsule Take 20 mg by mouth daily, Historical Med      oxyCODONE-acetaminophen (PERCOCET) 5-325 mg per tablet Take 1 tablet by mouth every 6 (six) hours as needed for moderate pain, Historical Med      sertraline (ZOLOFT) 100 mg tablet Take 100 mg by mouth daily, Historical Med           No discharge procedures on file  Prior to Admission Medications   Prescriptions Last Dose Informant Patient Reported? Taking?    ALPRAZolam (XANAX) 1 mg tablet   Yes Yes   Sig: Take 1 mg by mouth 3 (three) times a day as needed for anxiety   albuterol (2 5 mg/3 mL) 0 083 % nebulizer solution   Yes Yes   Sig: Take 2 5 mg by nebulization as needed for wheezing or shortness of breath   aspirin 325 mg tablet   Yes Yes   Sig: Take 325 mg by mouth daily   butalbital-acetaminophen-caffeine-codeine (FIORICET WITH CODEINE) -04-30 MG per capsule   Yes Yes   Sig: Take 1 capsule by mouth 2 (two) times a day   carvedilol (COREG) 6 25 mg tablet   Yes Yes   Sig: Take 6 25 mg by mouth 2 (two) times a day with meals   dicyclomine (BENTYL) 10 mg capsule   Yes Yes   Sig: Take 10 mg by mouth 4 (four) times a day (before meals and at bedtime)   fluticasone-salmeterol (ADVAIR HFA) 45-21 MCG/ACT inhaler   Yes Yes   Sig: Inhale 2 puffs 2 (two) times a day Rinse mouth after use    hydrochlorothiazide (HYDRODIURIL) 25 mg tablet   Yes Yes   Sig: Take 25 mg by mouth 2 (two) times a day   lisinopril (ZESTRIL) 20 mg tablet   Yes Yes   Sig: Take 20 mg by mouth 2 (two) times a day   methocarbamol (ROBAXIN) 500 mg tablet   Yes Yes   Sig: Take 500 mg by mouth daily at bedtime   montelukast (SINGULAIR) 10 mg tablet   Yes Yes   Sig: Take 10 mg by mouth daily at bedtime   omeprazole (PriLOSEC) 20 mg delayed release capsule   Yes Yes   Sig: Take 20 mg by mouth daily   oxyCODONE-acetaminophen (PERCOCET) 5-325 mg per tablet   Yes Yes   Sig: Take 1 tablet by mouth every 6 (six) hours as needed for moderate pain   sertraline (ZOLOFT) 100 mg tablet   Yes Yes   Sig: Take 100 mg by mouth daily Facility-Administered Medications: None       Portions of the record may have been created with voice recognition software  Occasional wrong word or "sound a like" substitutions may have occurred due to the inherent limitations of voice recognition software  Read the chart carefully and recognize, using context, where substitutions have occurred      Electronically signed by:  John Martinez 3

## 2023-01-17 NOTE — H&P
MEDICARE WELLNESS VISIT NOTE    HISTORY OF PRESENT ILLNESS:   Ivette Meade presents for her First Annual Medicare Wellness Visit.   She has complaints or concerns which include review lab test results, discuss itching and cough.      Patient Care Team:  BRADLEY Antony as PCP - General (Nurse Practitioner)  Alonzo Devlni MD (Internal Medicine- Interventional Cardiology)  Randy Nice MD as Referring Provider (Ophthalmology)        Patient Active Problem List   Diagnosis   • NSTEMI (non-ST elevated myocardial infarction) (CMS/[facility])   • ACS (acute coronary syndrome) (CMS/[facility])   • Atherosclerosis of native coronary artery of native heart without angina pectoris   • Class 1 obesity with serious comorbidity in adult   • Essential hypertension   • Mixed hyperlipidemia   • History of myocardial infarction         Past Medical History:   Diagnosis Date   • Glaucoma          Past Surgical History:   Procedure Laterality Date   • Cardiac catheterization/possible ptca/possible stent  06/15/2022   • Remove tonsils/adenoids,<13 y/o     • Tubal ligation           Social History     Tobacco Use   • Smoking status: Never   • Smokeless tobacco: Never   Vaping Use   • Vaping Use: never used   Substance Use Topics   • Alcohol use: Yes     Comment: \"maybe once a month\" states patient   • Drug use: Never     Drug use:    Drug Use:    Never           Family History   Problem Relation Age of Onset   • Heart disease Mother 76   • Heart disease Maternal Aunt    • Heart disease Maternal Uncle    • Heart disease Paternal Uncle        Current Outpatient Medications   Medication Sig Dispense Refill   • magnesium 250 MG tablet      • losartan (COZAAR) 25 MG tablet Take 1 tablet by mouth daily. 90 tablet 3   • atorvastatin (LIPITOR) 40 MG tablet Take 1 tablet by mouth nightly. 90 tablet 3   • metoPROLOL succinate (TOPROL-XL) 25 MG 24 hr tablet Take 1 tablet by mouth daily. 90 tablet 3   • ticagrelor (BRILINTA) 90 MG Tab Take 1  Psychiatric Evaluation - Stephania Agosto 29 y o  male MRN: [id number]  Unit/Bed#: [facility] 258-01 Encounter: [id number]    Assessment/Plan   Principal Problem:    Bipolar affective disorder, currently depressed, moderate ([address] )  Active Problems:    Generalized anxiety disorder    Cannabis abuse    Plan:   1  Check admission labs  2  Collaborate with family for baseline assessment and disposition planning  3  Continue Zoloft 150 mg daily and add Abilify 5 mg daily for mood stabilization and depression augmentation  4  Discontinue Xanax and add Ativan 1 mg b i d  for anxiety management  5  Increase Ambien to 10 mg at HS for insomnia  Risks, benefits and possible side effects of Medications:   Risks, benefits, and possible side effects of medications explained to patient and patient verbalizes understanding  Chief Complaint: "I don't want to go on living like this"    History of Present Illness     Patient is a 29 y o  male presents on 61 51 81 with recent worsening of depression and recent overdose attempt on unknown amount of ex to see, cough syrup, Xanax and white powder  Patient reports he did that with the intention of not waking up and dying  Patient presented to hospital on 12/09/2018 and was unresponsive  He was admitted to medical unit for 2 days and transferred yesterday to inpatient psychiatry unit for further management and stabilization  Patient's urine is positive for amphetamine, benzodiazepine, THC, cocaine, opiate and phencyclidine  Patient does report smoking marijuana but denies abusing meth, cocaine, heroin or phencyclidine  Patient reports using ecstasy in addition to cough syrup an Xanax and reports that these drugs may have been laced with dose    Patient remained cooperative during entire evaluation and describes recent worsening of depression with low energy, fluctuating mood, fluctuating irritability, decline in interest, poor concentration, fluctuating appetite, hopelessness and recent worsening of suicidal ideations  After detailed discussion patient agreed with past history of manic symptoms lasting for 3-4 days  No psychotic symptoms noted  Patient agreed with diagnosis of bipolar disorder and agreed with plan of needing a mood stabilizer  Discussed various mood stabilizers option including lithium, Depakote, Seroquel, combination of Prozac and Zyprexa, Latuda  Patient is focused on weight gain potential of these medication  Patient is compliant with Zoloft 150 mg daily  Agreed with plan of adding Abilify for mood stabilization and augmentation of antidepressant response  Patient is getting prescription of Xanax from same provider for longtime  Discussed the risk of worsening panic attack and anxiety with short half life of Xanax  He agreed with plan of switching Xanax to Ativan at this point  He does report feeling safe on the unit and is consenting for safety on the unit  Patient denies physical symptoms or any of the signs of benzodiazepine withdrawal     Medical Review Of Systems:  none    Psychiatric Review Of Systems:  sleep: yes  appetite changes: yes  weight changes: no  energy/anergy: yes  interest/pleasure/anhedonia: yes  somatic symptoms: yes  anxiety/panic: yes  sabra: no  guilty/hopeless: yes  self injurious behavior/risky behavior: yes    Historical Information     Past Psychiatric History:   One prior inpatient psychiatric admission at age 16 for depression and suicidal ideations  Currently in treatment with primary care physician  No psychiatrist or therapist at this point  Past Suicide attempts:   Yes  Past Violent behavior:   Denies  Past Psychiatric medication trial:   Zoloft, Lexapro, Effexor, mirtazapine, Klonopin, BuSpar, Seroquel    Substance Abuse History:  See HPI above      I spent time with patient in counseling and education on risk of substance abuse  Assessed him motivation and encouraged patient for treatment   Brief tablet by mouth in the morning and 1 tablet in the evening. 180 tablet 3   • Zinc 30 MG Tab      • [employer] Omega-3 Krill Oil 350 MG Cap      • COLLAGEN PO Take 1 Scoop by mouth daily. Ines Proteins- Collagen Peptides 1 scoop in coffee daily     • Multiple Vitamin (MULTI-VITAMIN DAILY PO) Take 2 tablets by mouth daily.     • Cholecalciferol (Vitamin D3) 25 MCG Tab Take 50 mcg by mouth daily.     • Biotin 5 MG Cap Take 5,000 mg by mouth.     • SUPER B COMPLEX/C PO Take 1 tablet by mouth.     • cetirizine (ZyrTEC) 10 MG tablet Take 10 mg by mouth daily.     • ELDERBERRY PO Take 2 tablets by mouth daily.     • aspirin 81 MG chewable tablet Chew 1 tablet by mouth daily. 90 tablet 1   • nitroGLYcerin (NITROSTAT) 0.4 MG sublingual tablet Place 1 tablet under the tongue every 5 minutes as needed for Chest pain. 100 tablet 0   • Lumigan 0.01 % ophthalmic solution INSTILL 1 DROP IN BOTH EYES ONCE IN THE EVENING     • Rhopressa 0.02 % Solution INSTILL 1 DROP IN BOTH EYES EVERY DAY     • timolol (TIMOPTIC) 0.5 % ophthalmic solution INSTILL 1 DROP INTO BOTH EYES EVERY MORNING       No current facility-administered medications for this visit.        The following items on the Medicare Health Risk Assessment were found to be positive  4.) During the past 4 weeks, what was the hardest physical activity you could do for at least 2 minutes?: Light     6 a.) How many servings of Fruits and Vegetables do you have each day ( 1 serving = 1 piece of fruit, 1/2 cup fruits or vegetables): 1 per day         Vision and Hearing screens: Hearing Screening - Comments:: Whisper screening test results:  Right - normal  Left - normal   Vision Screening - Comments:: Last eye exam fall 2022    Advance care planning documents on file - no     Cognitive/Functional Status: no evidence of cognitive dysfunction by direct observation    Opioid Review: Ivette is not taking opioid medications.    Recent PHQ 2/9 Score:    PHQ 2:  Date Adult PHQ 2 Score Adult  PHQ 2 Interpretation   1/14/2023 0 No further screening needed       PHQ 9:  Date Adult PHQ 9 Score   9/27/2022 0       DEPRESSION ASSESSMENT/PLAN:  Depression screening is negative no further plan needed.   Depression Screening-2 Questions: Patient was asked \"Over the past 2 weeks, how often have you been bothered by any of the following problems? Little interest or pleasure in doing things? and Feeling down, depressed, or hopeless? Patient scored 0 points.     SOCIAL HISTORY:  Seatbelt use: Yes  Sunscreen use: Yes  Hand rails on stairways: Yes  Grab bars in bathrooms: No  Throw Rugs: No  Smoke detectors: Yes  Carbon monoxide detector: Yes  Fire safety plan: Yes      Ambulation/Fall Risk: Pt. walks independently without restrictions  Patient is steady on their feet and has a Low Risk for falls.     Health Maintenance Due   Topic Date Due   • Hepatitis B Vaccine (For Physician/APC Discussion) (1 of 3 - 3-dose series) Never done   • DTaP/Tdap/Td Vaccine (1 - Tdap) Never done   • Colorectal Cancer Screen-  Never done   • Hepatitis C Screening  Never done   • Osteoporosis Screening  Never done   • Pneumococcal Vaccine 65+ (1 - PCV) Never done   • COVID-19 Vaccine (5 - Booster for Moderna series) 10/18/2022   • Medicare Advantage- Medicare Wellness Visit  01/01/2023       Patient is due for topics listed above, she wishes to proceed with Hepatitis C Screening and Osteoporosis screening, but is not proceeding with Immunization(s) COVID-19, Dtap/Tdap/Td and Pneumococcal and Colorectal Cancer Screening: Colonoscopy at this time. The following has occurred: Education provided for Immunization(s) COVID-19, Dtap/Tdap/Td and Pneumococcal, Colorectal Cancer Screening: Colonoscopy, Hepatitis C Screening and Osteoporosis screening.  Body mass index is 33.59 kg/m².    BMI ASSESSMENT/PLAN:  She will go back to exercising again.  Eats healthier now and is a water drinker now.    Needed Screening/Treatment:   Colorectal cancer  intervention done       Social History     Tobacco History     Smoking Status  Former Smoker Quit date  10/1/2018 Smoking Tobacco Type  Cigarettes    Smokeless Tobacco Use  Never Used          Alcohol History     Alcohol Use Status  No Comment  denies           Drug Use     Drug Use Status  Yes Types  Cocaine, MDMA (ecstacy), Marijuana, Methamphetamines Comment  reports recent use (overdose) only           Sexual Activity     Sexually Active  Not Asked          Activities of Daily Living    Not Asked               Additional Substance Use Detail     Questions Responses    Substance Use Assessment Substance use within the past 12 months    Alcohol Use Frequency Experimented    Cannabis frequency Past occasional use    Comment: Past occasional use on 12/11/2018     Heroin Frequency Denies use in past 12 months    Cannabis method Smoke    Comment: Smoke on 12/11/2018     Cannabis last use 12/9/18    Comment: 12/9/2018 on 12/11/2018     Longest Abstinence from Alcohol denies current use     Cocaine frequency Past rare use    Comment: Never used on 12/11/2018 Never used ->Past rare use on 12/11/2018     Crack Cocaine Frequency Denies use in past 12 months    Methamphetamine Frequency Experimented    Cocaine last use 12/9/18    Comment: 12/9/2018 on 12/11/2018     Methamphetamine Last Use & Amount 12/09/18, unknown amt    Cocaine Longest Abstinence unknown amount     Narcotic Frequency Denies use in past 12 months    Benzodiazepine Frequency 3 or more times/week    Amphetamine frequency Denies use in past 12 months    Benzodiazepine Last Use & Amount 12/09/18 unknown amt     Benzodiazepine Longest Abstinence prescribed, taken prn    Barbituate Frequency Denies use use in past 12 months    Inhalant frequency Never used    Comment: Never used on 12/11/2018     Hallucinogen frequency Never used    Comment: Never used on 12/11/2018     Ecstasy frequency Past rare use    Comment: Past rare use on 12/09/2018     Other drug frequency Past rare use    Comment: Never used on 12/11/2018 Never used ->Past rare use on 12/11/2018     Ecstasy method Pill    Comment: Pill on 12/11/2018     Opiate frequency Experimented    Other drug last use 12/9/18    Comment: 12/9/2018 on 12/11/2018     Ecstasy last use 12/9/18    Comment: 12/9/2018 on 12/11/2018     Other Substance Abuse Comments (free text) cough syrup DM     Opiate last use     Comment: prescribed percocet     Last reviewed by Kylie García RN on 12/11/2018        I have assessed this patient for substance use within the past 12 months      Family Psychiatric History:   Anxiety on maternal side of family  Mom with bipolar disorder    Social History:    Marital history: In relationship  Living arrangement, social support: Support systems: Lives with mother  Occupational History: unknown occupation  Functioning Relationships: good support system    Other Pertinent History: Financial      Traumatic History:   Abuse: Denies  Other Traumatic Events: See HPI above    Past Medical History:   Diagnosis Date    Anxiety     Asthma     Depression     Hypertension     IBS (irritable bowel syndrome)            Meds/Allergies   all current active meds have been reviewed  Allergies   Allergen Reactions    Pollen Extract     Toradol [Ketorolac Tromethamine] Hives       Objective   Vital signs in last 24 hours:  Temp:  [97 5 °F (36 4 °C)-98 6 °F (37 °C)] 98 6 °F (37 °C)  HR:  [104-117] 104  Resp:  [18-20] 18  BP: (123-156)/() 148/78    No intake or output data in the 24 hours ending 12/12/18 1225    Mental Status Evaluation:  Appearance:  casually dressed   Behavior:  guarded   Speech:  soft   Mood:  anxious and depressed   Affect:  constricted   Language: naming objects   Thought Process:  circumstantial   Thought Content:  obsessions   Perceptual Disturbances: None   Risk Potential: Suicidal Ideations without plan, Homicidal Ideations none and Potential for Aggression No   Sensorium: screening , Bone density, Hepatitis C and Immunizations reviewed and patient needs: COVID-19, Pneumococcal 20  and Tetanus  Needed follow up:  None    See orders.   See Patient Instructions section.   No follow-ups on file.      person, place and time/date   Cognition:  grossly intact   Consciousness:  awake    Attention: attention span appeared shorter than expected for age   Intellect: normal   Fund of Knowledge: awareness of current events: fair   Insight:  limited   Judgment: limited   Muscle Strength and Tone: arm(s): bilateral   Gait/Station: normal gait/station and normal balance   Motor Activity: no abnormal movements     Memory: Short and long term memory  fair       Laboratory results:    I have personally reviewed all pertinent laboratory/tests results    Labs in last 72 hours:   Recent Labs      12/11/18   0624  12/12/18   0634   WBC  7 93   --    RBC  3 97   --    HGB  12 4   --    HCT  38 7   --    PLT  252   --    RDW  14 1   --    NEUTROABS  4 37   --    SODIUM  139  140   K  3 6  3 6   CL  105  100   CO2  27  31   BUN  13  10   CREATININE  0 94  0 81   GLUC  130  101   GLUF   --   101*   CALCIUM  9 5  8 8   AST  63*  54*   ALT  143*  143*   ALKPHOS  123*  110   TP  7 2  7 1   ALB  3 6  3 6   TBILI  0 23  0 30   CHOLESTEROL   --   152   HDL   --   43   TRIG   --   131   LDLCALC   --   83   [id number]  5 450*   --    FREET4  0 71*   --    RPR   --   Non-Reactive     Admission Labs:   Admission on 12/11/2018   Component Date Value    Sodium 12/12/2018 140     Potassium 12/12/2018 3 6     Chloride 12/12/2018 100     CO2 12/12/2018 31     ANION GAP 12/12/2018 9     BUN 12/12/2018 10     Creatinine 12/12/2018 0 81     Glucose 12/12/2018 101     Glucose, Fasting 12/12/2018 101*    Calcium 12/12/2018 8 8     AST 12/12/2018 54*    ALT 12/12/2018 143*    Alkaline Phosphatase 12/12/2018 110     Total Protein 12/12/2018 7 1     Albumin 12/12/2018 3 6     Total Bilirubin 12/12/2018 0 30     eGFR 12/12/2018 121     Cholesterol 12/12/2018 152     Triglycerides 12/12/2018 131     HDL, Direct 12/12/2018 43     LDL Calculated 12/12/2018 83     Non-HDL-Chol (CHOL-HDL) 12/12/2018 109     RPR 12/12/2018 Non-Reactive     Hemoglobin A1C 12/12/2018 8 1*    EAG 12/12/2018 186      Risks / Benefits of Treatment:     Risks, benefits, and possible side effects of medications explained to patient  The patient verbalizes understanding and agreement for treatment  Counseling / Coordination of Care:     Patient's presentation on admission and proposed treatment plan discussed with treatment team   Diagnosis, medication changes and treatment plan reviewed with patient  Recent stressors discussed with patient     Events leading to admission reviewed with patient  Importance of medication and treatment compliance reviewed with patient  Inpatient Psychiatric Certification:     Certification: Based upon physical, mental and social evaluations, I certify that inpatient psychiatric services are medically necessary for this patient for a duration of 7 midnights for the treatment of Bipolar affective disorder, currently depressed, moderate ([facility] [address] )    Available alternative community resources do not meet the patient's mental health care needs  I further attest that an established written individualized plan of care has been implemented and is outlined in the patient's medical records  This note has been constructed using a voice recognition system  There may be translation, syntax,  or grammatical errors  If you have any questions, please contact the dictating provider

## 2023-09-18 NOTE — H&P
H&P- Sandeep Hernandez 1990, 34 y o  male MRN: [id number]    Unit/Bed#: [facility] 501-01 Encounter: [id number]    Primary Care Provider: Warner Anthony DO   Date and time admitted to hospital: 10/6/2019 10:09 PM        * Acute metabolic encephalopathy  Assessment & Plan  Patient presented with AMS  after he  took 200 mg of Benadryl+ unknown amount of Xanax  He reported ER he took the pills to help him with insomnia did not aim to overdose  However he was placed on one-to-one observation in the ER until psychiatry clearance  In the emergency room patient spiked fever 102 7F deemed to be secondary to Benadryl side effect  ER contacted poison Control and   on-call who recommended admit to telemetry with official toxicology consult in a m  For temperature above 103F can give Ativan IV  Will continue Tylenol and Ativan as needed  Patient received 3 L of normal saline his lactic acid dropped from 2 6 ==>1  Continue IV hydration  Neurochecks  Psychiatry consult  Toxicology consult  CC consult appreciate  Patient recommended for step-down 1    Overdose  Assessment & Plan  Patient claims it was unintentional overdose when he tried  combination of Benadryl + melatonin+ Xanax for insomnia  Management as above  Continue one-to-one  Psychiatry evaluation    Hyperthermia  Assessment & Plan  Deemed to be secondary to antihistamine  Overdose  Supportive care  Symptomatic treatment  Continue Tylenol  And Ativan as needed    BRANT (acute kidney injury) [facility])  Assessment & Plan  Unclear etiology, possible volume depletion prerenal azotemia   on September 30 creatinine was 0 87 today went up to 1 55  Will avoid nephrotoxin drugs  Continue vigorous IV hydration  Repeat BMP in a m      Lactic acidosis  Assessment & Plan  Most likely secondary to # 1, dehydration   Significantly improved with IV fluids  Continue overnight hydration    Essential hypertension  Assessment & Plan  Continue hydralazine IV p r n  and Coreg p o   Due to BRANT hold lisinopril and hydrochlorothiazide    Type 2 diabetes mellitus without complication, without long-term current use of insulin ([facility] )  Assessment & Plan  Lab Results   Component Value Date    HGBA1C 6 2 10/02/2019       No results for input(s): POCGLU in the last 72 hours  Blood Sugar Average: Last 72 hrs:  Continue Accu-Cheks insulin sliding scale      Bipolar affective disorder, currently depressed, moderate ([facility] )  Assessment & Plan  Psychiatry consult    Polysubstance abuse (HCC)  Assessment & Plan  Marihuana ,cocaine etc   Check urine drug screen    Acquired hypothyroidism  Assessment & Plan  Continue levothyroxine  Check TSH level      VTE Prophylaxis: Pharmacologic VTE Prophylaxis contraindicated due to Low risk  / sequential compression device   Code Status: full  POLST: There is no POLST form on file for this patient (pre-hospital)  Discussion with family:  No family members at bedside    Anticipated Length of Stay:  Patient will be admitted on an Inpatient basis with an anticipated length of stay of  > 2 midnights  Justification for Hospital Stay:  BRANT lactic acidosis altered mental status treatment    Total Time for Visit, including Counseling / Coordination of Care: 45 minutes  Greater than 50% of this total time spent on direct patient counseling and coordination of care  Chief Complaint:   Took Benadryl and melatonin    History of Present Illness:    Rickey Blood is a 34 y o  male with history of polysubstance abuse, bipolar ,depression who presented from home for evaluation of altered mental status and possible overdose  The patient denies any intentional overdose his suffering of insomnia and took a combination of Benadryl , Xanax and melatonin  However he took Benadryl approximately 200 mg and while in the emergency room his fever spiked to 103F, he also noted to be tachycardic and tachypneic  Initially elevated lactic acid of 2 6 went down to 1 with IV hydration  Creatinine went up from 0 87 on September 30 to 1 55  No EKG changes  Due to multiple lab abnormalities, fever he was evaluated by critical care who recommended step-down 1  Toxicology  advised to control fever with Tylenol and Ativan as needed  Patient able to protect his airways  Currently on continues observation  Admitted under hospitalist service to step-down 1    Review of Systems:    Review of Systems    Past Medical and Surgical History:     Past Medical History:   Diagnosis Date    Anxiety     Asthma     COPD [facility]chronic obstructive [facility] disease) [facility]     Depression     Diabetes mellitus ([facility] )     Disease of thyroid gland     Hypertension     IBS (irritable bowel syndrome)     Psychiatric disorder     Bipolar Disorder    Psychiatric illness        Past Surgical History:   Procedure Laterality Date    KNEE SURGERY Right     TONSILLECTOMY         Meds/Allergies:    Prior to Admission medications    Medication Sig Start Date End Date Taking? Authorizing Provider   albuterol (PROVENTIL HFA,VENTOLIN HFA) 90 mcg/act inhaler Inhale 2 puffs every 4 (four) hours as needed for wheezing 10/3/19   May Maria MD   ARIPiprazole (ABILIFY) 5 mg tablet Take 1 tablet (5 mg total) by mouth daily 10/4/19   May Maria MD   carvedilol (COREG) 6 25 mg tablet Take 1 tablet (6 25 mg total) by mouth 2 (two) times a day with meals 10/3/19   May Maria MD   fluticasone-salmeterol (ADVAIR HFA) 45-21 MCG/ACT inhaler Inhale 2 puffs 2 (two) times a day Rinse mouth after use   12/18/18   Ines Gilbert PA-C   hydrochlorothiazide (HYDRODIURIL) 12 5 mg tablet Take 1 tablet (12 5 mg total) by mouth daily 10/4/19   May Maria MD   levothyroxine 25 mcg tablet Take 1 tablet (25 mcg total) by mouth daily in the early morning 10/3/19   May Maria MD   lisinopril (ZESTRIL) 20 mg tablet Take 1 tablet (20 mg total) by mouth daily 10/4/19   May Maria MD   nicotine (Andreina Kobus) 21 mg/24 hr TD 24 hr patch Place 1 patch on the skin daily 10/4/19   Fannie Rosales MD   pantoprazole (PROTONIX) 20 mg tablet Take 1 tablet (20 mg total) by mouth daily in the early morning 10/4/19   Fannie Rosales MD   sertraline (ZOLOFT) 50 mg tablet Take 1 tablet (50 mg total) by mouth daily 10/4/19   Fannie Rosales MD   traZODone (DESYREL) 50 mg tablet Take 1 tablet (50 mg total) by mouth daily at bedtime as needed for sleep 10/3/19   Fannie Rosales MD     I have reviewed home medications with a medical source (PCP, Pharmacy, other)  Allergies: Allergies   Allergen Reactions    Pollen Extract        Social History:     Marital Status: Single   Occupation: none  Patient Pre-hospital Living Situation: home  Patient Pre-hospital Level of Mobility: reg  Patient Pre-hospital Diet Restrictions: reg  Substance Use History:   Social History     Substance and Sexual Activity   Alcohol Use No    Comment: denies      Social History     Tobacco Use   Smoking Status Smoker, Current Status Unknown    Packs/day: 1 00    Types: Cigarettes   Smokeless Tobacco Never Used     Social History     Substance and Sexual Activity   Drug Use Yes    Types: MDMA (ecstacy), Marijuana, Methamphetamines, Cocaine    Comment: reports recent use (overdose) only        Family History:    non-contributory    Physical Exam:     Vitals:   Blood Pressure: 131/64 (10/07/19 0215)  Pulse: (!) 108 (10/07/19 0215)  Temperature: 100 °F (37 8 °C) (10/07/19 0215)  Temp Source: Oral (10/06/19 2307)  Respirations: (!) 25 (10/07/19 0215)  Height: 5' 11" (180 3 cm) (10/07/19 0307)  Weight - Scale: 123 kg (270 lb 1 oz) (10/07/19 0307)  SpO2: 97 % (10/07/19 0215)    Physical Exam   Constitutional: No distress  HENT:   Head: Normocephalic  Dry oral mucosa   Eyes: Pupils are equal, round, and reactive to light  Neck: Normal range of motion  Cardiovascular: Regular rhythm     Pulmonary/Chest: Effort normal    Abdominal:   Obese soft nontender   Musculoskeletal: He exhibits no edema  Neurological: He is alert  Skin: Skin is warm  Psychiatric: He has a normal mood and affect  Additional Data:     Lab Results: I have personally reviewed pertinent reports  Results from last 7 days   Lab Units 10/06/19  2259  10/06/19  2227   WBC Thousand/uL  --   --  11 23*   HEMOGLOBIN g/dL  --   --  12 8   I STAT HEMOGLOBIN g/dl 12 6   < >  --    HEMATOCRIT %  --   --  40 4   HEMATOCRIT, ISTAT % 37   < >  --    PLATELETS Thousands/uL  --   --  348   NEUTROS PCT %  --   --  85*   LYMPHS PCT %  --   --  10*   MONOS PCT %  --   --  5   EOS PCT %  --   --  0    < > = values in this interval not displayed  Results from last 7 days   Lab Units 10/06/19  2259  10/06/19  2227   SODIUM mmol/L  --   --  140   POTASSIUM mmol/L  --   --  4 7   CHLORIDE mmol/L  --   --  107   CO2 mmol/L  --   --  25   CO2, I-STAT mmol/L 26   < >  --    BUN mg/dL  --   --  16   CREATININE mg/dL  --   --  1 55*   ANION GAP mmol/L  --   --  8   CALCIUM mg/dL  --   --  9 5   ALBUMIN g/dL  --   --  4 3   TOTAL BILIRUBIN mg/dL  --   --  0 62   ALK PHOS U/L  --   --  118*   ALT U/L  --   --  78   AST U/L  --   --  37   GLUCOSE RANDOM mg/dL  --   --  133    < > = values in this interval not displayed  Results from last 7 days   Lab Units 10/03/19  1148 10/03/19  0756 10/02/19  2001 10/02/19  1713 10/02/19  1143 10/02/19  0747 10/01/19  2047 10/01/19  1727 10/01/19  1235   POC GLUCOSE mg/dl 101 114 138 116 94 110 115 107 106     Results from last 7 days   Lab Units 10/02/19  0600   HEMOGLOBIN A1C % 6 2     Results from last 7 days   Lab Units 10/07/19  0115 10/06/19  2227   LACTIC ACID mmol/L 1 0 2 6*       Imaging: I have personally reviewed pertinent reports        No orders to display       EKG, Pathology, and Other Studies Reviewed on Admission:   · EKG: sinus rhythm    Allscripts / Epic Records Reviewed: Yes     ** Please Note: This note has been constructed using a voice recognition system   ** Yes

## 2025-05-28 NOTE — SOCIAL WORK
CM informed pt medically clear for d/c today  Per [facility], bed available for pt at [facility]  CM phoned [facility] ([phone]) and spoke with Irma Bello  who approved 3 days for pt  Per Kory Evans will need to call [employer] to obtain auth # when pt is admitted  Transport arranged via [facility] [address] at 7:30 to [facility]  Pt advised he notified his significant other of d/c--denies need for CM to notify anyone else  Chart copy requested  Transfer to facility and CMN forms completed  yes